# Patient Record
Sex: MALE | URBAN - METROPOLITAN AREA
[De-identification: names, ages, dates, MRNs, and addresses within clinical notes are randomized per-mention and may not be internally consistent; named-entity substitution may affect disease eponyms.]

---

## 2020-08-28 ENCOUNTER — TELEPHONE (OUTPATIENT)
Dept: NEUROLOGY | Age: 70
End: 2020-08-28

## 2023-10-05 PROBLEM — R93.1 AGATSTON CORONARY ARTERY CALCIUM SCORE LESS THAN 100: Status: ACTIVE | Noted: 2023-10-05

## 2023-10-05 PROBLEM — L57.0 ACTINIC KERATOSIS: Status: ACTIVE | Noted: 2023-10-05

## 2023-10-05 PROBLEM — R16.0 HEPATOMEGALY: Status: ACTIVE | Noted: 2023-10-05

## 2023-10-05 PROBLEM — M17.12 LEFT KNEE DJD: Status: ACTIVE | Noted: 2023-10-05

## 2023-10-05 PROBLEM — R94.31 ABNORMAL EKG: Status: ACTIVE | Noted: 2023-10-05

## 2023-10-05 PROBLEM — E66.811 CLASS 1 OBESITY WITH BODY MASS INDEX (BMI) OF 33.0 TO 33.9 IN ADULT: Status: ACTIVE | Noted: 2023-10-05

## 2023-10-05 PROBLEM — K05.10 GINGIVITIS: Status: ACTIVE | Noted: 2023-10-05

## 2023-10-05 PROBLEM — R06.02 SHORTNESS OF BREATH: Status: ACTIVE | Noted: 2023-10-05

## 2023-10-05 PROBLEM — D18.01 HEMANGIOMA OF SKIN AND SUBCUTANEOUS TISSUE: Status: ACTIVE | Noted: 2021-03-26

## 2023-10-05 PROBLEM — L81.4 OTHER MELANIN HYPERPIGMENTATION: Status: ACTIVE | Noted: 2021-03-26

## 2023-10-05 PROBLEM — L98.9 SKIN LESION: Status: ACTIVE | Noted: 2023-10-05

## 2023-10-05 PROBLEM — I49.9 VENTRICULAR ARRHYTHMIA: Status: ACTIVE | Noted: 2023-10-05

## 2023-10-05 PROBLEM — N52.9 ERECTILE DYSFUNCTION: Status: ACTIVE | Noted: 2023-10-05

## 2023-10-05 PROBLEM — C44.519 BASAL CELL CARCINOMA OF SKIN OF OTHER PART OF TRUNK: Status: ACTIVE | Noted: 2021-03-26

## 2023-10-05 PROBLEM — I10 BENIGN ESSENTIAL HYPERTENSION: Status: ACTIVE | Noted: 2023-10-05

## 2023-10-05 PROBLEM — R60.0 BILATERAL LEG EDEMA: Status: ACTIVE | Noted: 2023-10-05

## 2023-10-05 PROBLEM — I25.10 CAD (CORONARY ARTERY DISEASE): Status: ACTIVE | Noted: 2023-10-05

## 2023-10-05 PROBLEM — D48.5 NEOPLASM OF UNCERTAIN BEHAVIOR OF SKIN: Status: ACTIVE | Noted: 2021-03-26

## 2023-10-05 PROBLEM — D22.70 MELANOCYTIC NEVI OF LOWER LIMB, INCLUDING HIP: Status: ACTIVE | Noted: 2021-03-26

## 2023-10-05 PROBLEM — M19.011 DJD OF RIGHT SHOULDER: Status: ACTIVE | Noted: 2023-10-05

## 2023-10-05 PROBLEM — R97.20 ELEVATED PSA: Status: ACTIVE | Noted: 2023-10-05

## 2023-10-05 PROBLEM — R73.9 HYPERGLYCEMIA: Status: ACTIVE | Noted: 2023-10-05

## 2023-10-05 PROBLEM — L82.1 OTHER SEBORRHEIC KERATOSIS: Status: ACTIVE | Noted: 2021-03-26

## 2023-10-05 PROBLEM — E78.5 HYPERLIPIDEMIA: Status: ACTIVE | Noted: 2023-10-05

## 2023-10-05 PROBLEM — J30.9 ALLERGIC RHINITIS: Status: ACTIVE | Noted: 2023-10-05

## 2023-10-05 PROBLEM — K12.2 INFECTION OF MOUTH: Status: ACTIVE | Noted: 2023-10-05

## 2023-10-05 PROBLEM — E66.9 CLASS 1 OBESITY WITH BODY MASS INDEX (BMI) OF 33.0 TO 33.9 IN ADULT: Status: ACTIVE | Noted: 2023-10-05

## 2023-10-05 PROBLEM — D72.10 EOSINOPHILIA: Status: ACTIVE | Noted: 2023-10-05

## 2023-10-05 PROBLEM — K59.9 BOWEL DYSFUNCTION: Status: ACTIVE | Noted: 2023-10-05

## 2023-10-05 PROBLEM — S46.219A BICEPS MUSCLE TEAR: Status: ACTIVE | Noted: 2023-10-05

## 2023-10-05 PROBLEM — I49.3 PVC (PREMATURE VENTRICULAR CONTRACTION): Status: ACTIVE | Noted: 2023-10-05

## 2023-10-05 PROBLEM — M17.11 RIGHT KNEE DJD: Status: ACTIVE | Noted: 2023-10-05

## 2023-10-05 PROBLEM — R82.89 ABNORMAL URINE CYTOLOGY: Status: ACTIVE | Noted: 2023-10-05

## 2023-10-05 PROBLEM — M54.17 LUMBOSACRAL RADICULOPATHY AT L1: Status: ACTIVE | Noted: 2023-10-05

## 2023-10-05 PROBLEM — K76.0 FATTY LIVER: Status: ACTIVE | Noted: 2023-10-05

## 2023-10-05 PROBLEM — F10.10 ALCOHOL ABUSE: Status: ACTIVE | Noted: 2023-10-05

## 2023-10-05 PROBLEM — D22.60 MELANOCYTIC NEVI OF UNSPECIFIED UPPER LIMB, INCLUDING SHOULDER: Status: ACTIVE | Noted: 2021-03-26

## 2023-10-05 PROBLEM — D22.5 MELANOCYTIC NEVI OF TRUNK: Status: ACTIVE | Noted: 2021-03-26

## 2023-10-05 PROBLEM — L57.0 BENIGN KERATOSIS OF SKIN: Status: ACTIVE | Noted: 2023-10-05

## 2023-10-05 PROBLEM — E66.9 OBESITY (BMI 30-39.9): Status: ACTIVE | Noted: 2023-10-05

## 2023-10-05 RX ORDER — HYDROCHLOROTHIAZIDE 12.5 MG/1
12.5 CAPSULE ORAL DAILY
COMMUNITY
End: 2024-02-19 | Stop reason: WASHOUT

## 2023-10-05 RX ORDER — PRAVASTATIN SODIUM 20 MG/1
20 TABLET ORAL NIGHTLY
COMMUNITY
End: 2024-02-19 | Stop reason: SDUPTHER

## 2023-10-05 RX ORDER — DESLORATADINE 5 MG/1
5 TABLET ORAL DAILY
COMMUNITY
End: 2024-05-20

## 2023-10-05 RX ORDER — LIDOCAINE HCL 4 G/100G
CREAM TOPICAL DAILY PRN
COMMUNITY
Start: 2022-07-01

## 2023-10-05 RX ORDER — MELOXICAM 7.5 MG/1
7.5 TABLET ORAL DAILY
COMMUNITY
Start: 2023-02-28

## 2023-10-05 RX ORDER — LOSARTAN POTASSIUM 50 MG/1
1 TABLET ORAL DAILY
COMMUNITY

## 2023-10-05 RX ORDER — ASPIRIN 81 MG/1
1 TABLET ORAL DAILY
COMMUNITY

## 2023-10-05 RX ORDER — FLUTICASONE PROPIONATE 50 MCG
2 SPRAY, SUSPENSION (ML) NASAL DAILY
COMMUNITY
Start: 2023-03-27

## 2023-10-05 RX ORDER — METOPROLOL SUCCINATE 50 MG/1
25 TABLET, EXTENDED RELEASE ORAL 2 TIMES DAILY
COMMUNITY
End: 2024-02-19 | Stop reason: SDUPTHER

## 2023-10-05 RX ORDER — NITROFURANTOIN MACROCRYSTALS 50 MG/1
1 CAPSULE ORAL EVERY 12 HOURS
COMMUNITY
End: 2023-10-06 | Stop reason: ALTCHOICE

## 2023-10-05 RX ORDER — AMLODIPINE BESYLATE 10 MG/1
10 TABLET ORAL DAILY
COMMUNITY
End: 2023-10-06 | Stop reason: ALTCHOICE

## 2023-10-05 RX ORDER — IBUPROFEN 800 MG/1
800 TABLET ORAL 3 TIMES DAILY PRN
COMMUNITY
End: 2024-02-19 | Stop reason: SDUPTHER

## 2023-10-06 ENCOUNTER — OFFICE VISIT (OUTPATIENT)
Dept: CARDIOLOGY | Facility: CLINIC | Age: 73
End: 2023-10-06
Payer: MEDICARE

## 2023-10-06 VITALS
HEART RATE: 59 BPM | WEIGHT: 232.6 LBS | DIASTOLIC BLOOD PRESSURE: 80 MMHG | BODY MASS INDEX: 33.3 KG/M2 | HEIGHT: 70 IN | SYSTOLIC BLOOD PRESSURE: 130 MMHG

## 2023-10-06 DIAGNOSIS — R94.31 ABNORMAL EKG: ICD-10-CM

## 2023-10-06 DIAGNOSIS — I10 BENIGN ESSENTIAL HYPERTENSION: ICD-10-CM

## 2023-10-06 DIAGNOSIS — Z78.9 NEVER SMOKED CIGARETTES: ICD-10-CM

## 2023-10-06 DIAGNOSIS — I71.21 AORTIC ROOT ANEURYSM (CMS-HCC): ICD-10-CM

## 2023-10-06 DIAGNOSIS — E78.2 MIXED HYPERLIPIDEMIA: ICD-10-CM

## 2023-10-06 DIAGNOSIS — E66.9 CLASS 1 OBESITY WITH BODY MASS INDEX (BMI) OF 33.0 TO 33.9 IN ADULT, UNSPECIFIED OBESITY TYPE, UNSPECIFIED WHETHER SERIOUS COMORBIDITY PRESENT: ICD-10-CM

## 2023-10-06 DIAGNOSIS — I25.10 CORONARY ARTERY DISEASE INVOLVING NATIVE CORONARY ARTERY OF NATIVE HEART WITHOUT ANGINA PECTORIS: ICD-10-CM

## 2023-10-06 DIAGNOSIS — R93.1 AGATSTON CORONARY ARTERY CALCIUM SCORE LESS THAN 100: ICD-10-CM

## 2023-10-06 PROBLEM — Q25.43 AORTIC ROOT ANEURYSM: Status: ACTIVE | Noted: 2023-10-06

## 2023-10-06 PROCEDURE — 99214 OFFICE O/P EST MOD 30 MIN: CPT | Performed by: INTERNAL MEDICINE

## 2023-10-06 PROCEDURE — 1036F TOBACCO NON-USER: CPT | Performed by: INTERNAL MEDICINE

## 2023-10-06 PROCEDURE — 3075F SYST BP GE 130 - 139MM HG: CPT | Performed by: INTERNAL MEDICINE

## 2023-10-06 PROCEDURE — 1159F MED LIST DOCD IN RCRD: CPT | Performed by: INTERNAL MEDICINE

## 2023-10-06 PROCEDURE — 3079F DIAST BP 80-89 MM HG: CPT | Performed by: INTERNAL MEDICINE

## 2023-10-06 PROCEDURE — 3008F BODY MASS INDEX DOCD: CPT | Performed by: INTERNAL MEDICINE

## 2023-10-06 NOTE — PATIENT INSTRUCTIONS
Patient to follow up in 1 year with Dr. Justino MD     Echo is okay, it did demonstrate a small Aortic Root Aneurysm.   We will just monitor this annually for growth.     No other changes today.   Continue same medications.

## 2023-10-06 NOTE — PROGRESS NOTES
Referred by Dr. Zuluaga ref. provider found provider found for   Chief Complaint   Patient presents with    Follow-up     MPL/ECHO        History of Present Illness  Johann Casey is a 73 y.o. year old male patient patient is here for follow-up.  Nuclear stress test was completely normal.  Echocardiogram was normal except for mild dilatation of aortic root about 3.9 cm.  The results were discussed with the patient great length.  Apparently has some swelling from amlodipine therefore I told him to take it only as needed.  We will repeat echocardiogram in 1 year to evaluate aortic root aneurysm.  We will follow-up as scheduled    Past Medical History  Past Medical History:   Diagnosis Date    Disorder of the skin and subcutaneous tissue, unspecified     Skin lesion    Generalized skin eruption due to drugs and medicaments taken internally     Drug rash    Other specified abnormal findings of blood chemistry 02/25/2022    Abnormal LFTs    Other urticaria     Urticaria, acute    Strain of muscle, fascia and tendon of other parts of biceps, unspecified arm, initial encounter 02/25/2022    Biceps muscle tear       Social History  Social History     Tobacco Use    Smoking status: Never    Smokeless tobacco: Never   Substance Use Topics    Alcohol use: Yes    Drug use: Never       Family History     Family History   Problem Relation Name Age of Onset    Hypertension Mother      Cancer Mother      Colon cancer Mother      Hypertension Father      Lung cancer Father      Cancer Father         Review of Systems  As per HPI, all other systems reviewed and negative.    Allergies:  Allergies   Allergen Reactions    Amoxicillin Unknown    Losartan Rash        Outpatient Medications:  Current Outpatient Medications   Medication Instructions    aspirin 81 mg EC tablet 1 tablet, oral, Daily    desloratadine (CLARINEX) 5 mg, oral, Daily    fluticasone (Flonase) 50 mcg/actuation nasal spray 2 sprays, Each Nostril, Daily     hydroCHLOROthiazide (MICROZIDE) 12.5 mg, oral, Daily    ibuprofen 800 mg, oral, 3 times daily PRN, Take with food.    lidocaine (lidocaine HCL) 4 % cream Topical, Daily PRN    losartan (Cozaar) 50 mg tablet 1 tablet, oral, Daily    meloxicam (MOBIC) 7.5 mg, oral, Daily    metoprolol succinate XL (TOPROL-XL) 25 mg, oral, 2 times daily    pravastatin (PRAVACHOL) 20 mg, oral, Nightly         Vitals:  Vitals:    10/06/23 1423   BP: 130/80   Pulse: 59       Physical Exam:  Physical Exam  HENT:      Head: Normocephalic and atraumatic.   Eyes:      Extraocular Movements: Extraocular movements intact.      Pupils: Pupils are equal, round, and reactive to light.   Cardiovascular:      Rate and Rhythm: Normal rate and regular rhythm.      Pulses: Normal pulses.      Heart sounds: Normal heart sounds.   Pulmonary:      Effort: Pulmonary effort is normal.      Breath sounds: Normal breath sounds.   Abdominal:      General: Abdomen is flat.      Palpations: Abdomen is soft.   Musculoskeletal:      Right lower leg: No edema.      Left lower leg: No edema.   Skin:     General: Skin is warm and dry.   Neurological:      General: No focal deficit present.      Mental Status: He is alert and oriented to person, place, and time.             Assessment/Plan   Diagnoses and all orders for this visit:  Coronary artery disease involving native coronary artery of native heart without angina pectoris  Agatston coronary artery calcium score less than 100  Abnormal EKG  Benign essential hypertension  Mixed hyperlipidemia  Class 1 obesity with body mass index (BMI) of 33.0 to 33.9 in adult, unspecified obesity type, unspecified whether serious comorbidity present  Never smoked cigarettes  Aortic root aneurysm (CMS/HCC)          Fred Badillo MD [facility]  Interventional Cardiology   of [facility]     Thank you for allowing me to participate in the care of this patient. Please do not hesitate to contact me with any further  questions or concerns.

## 2024-02-19 ENCOUNTER — OFFICE VISIT (OUTPATIENT)
Dept: PRIMARY CARE | Facility: CLINIC | Age: 74
End: 2024-02-19
Payer: MEDICARE

## 2024-02-19 VITALS
HEIGHT: 70 IN | SYSTOLIC BLOOD PRESSURE: 122 MMHG | WEIGHT: 234.2 LBS | BODY MASS INDEX: 33.53 KG/M2 | TEMPERATURE: 98.1 F | HEART RATE: 57 BPM | DIASTOLIC BLOOD PRESSURE: 74 MMHG

## 2024-02-19 DIAGNOSIS — E66.9 OBESITY (BMI 30-39.9): ICD-10-CM

## 2024-02-19 DIAGNOSIS — Z00.00 WELL ADULT HEALTH CHECK: ICD-10-CM

## 2024-02-19 DIAGNOSIS — Z00.00 ROUTINE GENERAL MEDICAL EXAMINATION AT HEALTH CARE FACILITY: Primary | ICD-10-CM

## 2024-02-19 DIAGNOSIS — Z12.12 ENCOUNTER FOR COLORECTAL CANCER SCREENING: ICD-10-CM

## 2024-02-19 DIAGNOSIS — K76.0 FATTY LIVER: ICD-10-CM

## 2024-02-19 DIAGNOSIS — M17.0 OSTEOARTHRITIS OF BOTH KNEES, UNSPECIFIED OSTEOARTHRITIS TYPE: ICD-10-CM

## 2024-02-19 DIAGNOSIS — Z12.11 ENCOUNTER FOR COLORECTAL CANCER SCREENING: ICD-10-CM

## 2024-02-19 DIAGNOSIS — E78.2 MIXED HYPERLIPIDEMIA: ICD-10-CM

## 2024-02-19 DIAGNOSIS — R93.1 AGATSTON CORONARY ARTERY CALCIUM SCORE LESS THAN 100: ICD-10-CM

## 2024-02-19 DIAGNOSIS — I10 ESSENTIAL (PRIMARY) HYPERTENSION: ICD-10-CM

## 2024-02-19 DIAGNOSIS — I10 BENIGN ESSENTIAL HYPERTENSION: ICD-10-CM

## 2024-02-19 PROBLEM — H47.093 ASYMMETRY OF OPTIC NERVE OF BOTH EYES: Status: ACTIVE | Noted: 2023-10-20

## 2024-02-19 PROBLEM — H52.222 REGULAR ASTIGMATISM OF LEFT EYE: Status: ACTIVE | Noted: 2023-10-20

## 2024-02-19 PROBLEM — I77.89 ENLARGED AORTA (CMS-HCC): Status: ACTIVE | Noted: 2023-11-20

## 2024-02-19 PROBLEM — H35.3131 EARLY DRY STAGE NONEXUDATIVE AGE-RELATED MACULAR DEGENERATION OF BOTH EYES: Status: ACTIVE | Noted: 2023-10-20

## 2024-02-19 PROBLEM — H53.042 AMBLYOPIA SUSPECT, LEFT EYE: Status: ACTIVE | Noted: 2023-10-20

## 2024-02-19 PROBLEM — H02.834 DERMATOCHALASIS OF BOTH UPPER EYELIDS: Status: ACTIVE | Noted: 2023-10-20

## 2024-02-19 PROBLEM — Z96.1 PSEUDOPHAKIA: Status: ACTIVE | Noted: 2023-12-01

## 2024-02-19 PROBLEM — H02.831 DERMATOCHALASIS OF BOTH UPPER EYELIDS: Status: ACTIVE | Noted: 2023-10-20

## 2024-02-19 PROCEDURE — 1159F MED LIST DOCD IN RCRD: CPT | Performed by: INTERNAL MEDICINE

## 2024-02-19 PROCEDURE — 1160F RVW MEDS BY RX/DR IN RCRD: CPT | Performed by: INTERNAL MEDICINE

## 2024-02-19 PROCEDURE — 1170F FXNL STATUS ASSESSED: CPT | Performed by: INTERNAL MEDICINE

## 2024-02-19 PROCEDURE — 3074F SYST BP LT 130 MM HG: CPT | Performed by: INTERNAL MEDICINE

## 2024-02-19 PROCEDURE — 3008F BODY MASS INDEX DOCD: CPT | Performed by: INTERNAL MEDICINE

## 2024-02-19 PROCEDURE — G0439 PPPS, SUBSEQ VISIT: HCPCS | Performed by: INTERNAL MEDICINE

## 2024-02-19 PROCEDURE — 1036F TOBACCO NON-USER: CPT | Performed by: INTERNAL MEDICINE

## 2024-02-19 PROCEDURE — 3078F DIAST BP <80 MM HG: CPT | Performed by: INTERNAL MEDICINE

## 2024-02-19 RX ORDER — LOSARTAN POTASSIUM 50 MG/1
50 TABLET ORAL DAILY
Qty: 90 TABLET | Refills: 0 | Status: CANCELLED | OUTPATIENT
Start: 2024-02-19

## 2024-02-19 RX ORDER — HYDROCHLOROTHIAZIDE 12.5 MG/1
12.5 CAPSULE ORAL DAILY
Qty: 90 CAPSULE | Refills: 1 | Status: SHIPPED | OUTPATIENT
Start: 2024-02-19

## 2024-02-19 RX ORDER — METOPROLOL SUCCINATE 50 MG/1
25 TABLET, EXTENDED RELEASE ORAL 2 TIMES DAILY
Qty: 90 TABLET | Refills: 1 | Status: SHIPPED | OUTPATIENT
Start: 2024-02-19

## 2024-02-19 RX ORDER — IBUPROFEN 400 MG/1
400 TABLET ORAL 3 TIMES DAILY PRN
Qty: 21 TABLET | Refills: 0 | Status: SHIPPED | OUTPATIENT
Start: 2024-02-19 | End: 2024-02-26

## 2024-02-19 RX ORDER — LIDOCAINE HCL 4 G/100G
CREAM TOPICAL DAILY PRN
Status: CANCELLED | OUTPATIENT
Start: 2024-02-19

## 2024-02-19 RX ORDER — PRAVASTATIN SODIUM 20 MG/1
20 TABLET ORAL NIGHTLY
Qty: 90 TABLET | Refills: 1 | Status: SHIPPED | OUTPATIENT
Start: 2024-02-19

## 2024-02-19 ASSESSMENT — ACTIVITIES OF DAILY LIVING (ADL)
DOING_HOUSEWORK: INDEPENDENT
BATHING: INDEPENDENT
MANAGING_FINANCES: INDEPENDENT
TAKING_MEDICATION: INDEPENDENT
DRESSING: INDEPENDENT
GROCERY_SHOPPING: INDEPENDENT

## 2024-02-19 ASSESSMENT — PATIENT HEALTH QUESTIONNAIRE - PHQ9
2. FEELING DOWN, DEPRESSED OR HOPELESS: NOT AT ALL
SUM OF ALL RESPONSES TO PHQ9 QUESTIONS 1 AND 2: 0
1. LITTLE INTEREST OR PLEASURE IN DOING THINGS: NOT AT ALL

## 2024-02-19 NOTE — PROGRESS NOTES
"Subjective   Reason for Visit: Johann Casey is an 73 y.o. male here for a Medicare Wellness visit.          Reviewed all medications by prescribing practitioner or clinical pharmacist (such as prescriptions, OTCs, herbal therapies and supplements) and documented in the medical record.    HPI  Came for annual wellness visit he is happy to report that he has not been drinking he is very well he is happy about his status and has no complaint wanted refill  He does have a living will  His immunization schedule is not very clear and he has information at home and he will bring it at the next visit so that appropriate correction is made and appropriate vaccination is advised  He did not have a colonoscopic examination and he initially was reluctant but agreed to for it    Patient Care Team:  Bg Yanez MD as PCP - General  Bg Yanez MD as PCP - [employer] PCP     Review of Systems tensioning negative except that the knees had arthritis problems and he has had a steroid injection by orthopedic physician which is working well  Past medical history reviewed  Social and family history reviewed  Allergies and medications reviewed  Recent labs reviewed  Vital signs reviewed    Objective   Vitals:  /74 (BP Location: Right arm, Patient Position: Sitting)   Pulse 57   Temp 36.7 °C (98.1 °F)   Ht 1.765 m (5' 9.5\")   Wt 106 kg (234 lb 3.2 oz)   BMI 34.09 kg/m²       Physical Exam  HEENT exam is negative heart sounds are regular chest is clear abdomen is soft nontender neuro awake alert    Assessment/Plan   Problem List Items Addressed This Visit       Agatston coronary artery calcium score less than 100    Benign essential hypertension    Fatty liver    Relevant Orders    TSH with reflex to Free T4 if abnormal    Hyperlipidemia    Relevant Medications    pravastatin (Pravachol) 20 mg tablet    Other Relevant Orders    Comprehensive Metabolic Panel    Lipid Panel    TSH with reflex to Free T4 " if abnormal    Osteoarthritis of both knees    Relevant Medications    ibuprofen 400 mg tablet    Obesity (BMI 30-39.9)    Relevant Orders    TSH with reflex to Free T4 if abnormal    Encounter for colorectal cancer screening    Relevant Orders    Colonoscopy Screening; Average Risk Patient     Other Visit Diagnoses       Routine general medical examination at health care facility    -  Primary    Essential (primary) hypertension        Relevant Medications    hydroCHLOROthiazide (Microzide) 12.5 mg capsule    metoprolol succinate XL (Toprol-XL) 50 mg 24 hr tablet          Follow-up in 3 months time but most important we are not sure about his medication he will go home and calling to address discrepancy in the medication list  It seems he is not on losartan but losartan is carrying on from before and that is not the right choice and that was not refilled  He was supposed to be amlodipine but that is not on the list either  Labs as ordered to be done which is due  Colonoscopy ordered

## 2024-05-19 DIAGNOSIS — J30.9 ALLERGIC RHINITIS, UNSPECIFIED: ICD-10-CM

## 2024-05-20 RX ORDER — DESLORATADINE 5 MG/1
5 TABLET ORAL DAILY
Qty: 90 TABLET | Refills: 0 | Status: SHIPPED | OUTPATIENT
Start: 2024-05-20

## 2024-08-13 DIAGNOSIS — E78.2 MIXED HYPERLIPIDEMIA: ICD-10-CM

## 2024-08-13 DIAGNOSIS — I10 ESSENTIAL (PRIMARY) HYPERTENSION: ICD-10-CM

## 2024-08-14 RX ORDER — METOPROLOL SUCCINATE 50 MG/1
25 TABLET, EXTENDED RELEASE ORAL 2 TIMES DAILY
Qty: 180 TABLET | Refills: 0 | Status: SHIPPED | OUTPATIENT
Start: 2024-08-14

## 2024-08-14 RX ORDER — HYDROCHLOROTHIAZIDE 12.5 MG/1
12.5 CAPSULE ORAL DAILY
Qty: 90 CAPSULE | Refills: 0 | Status: SHIPPED | OUTPATIENT
Start: 2024-08-14

## 2024-08-14 RX ORDER — PRAVASTATIN SODIUM 20 MG/1
20 TABLET ORAL NIGHTLY
Qty: 90 TABLET | Refills: 0 | Status: SHIPPED | OUTPATIENT
Start: 2024-08-14

## 2024-08-22 ENCOUNTER — APPOINTMENT (OUTPATIENT)
Dept: RADIOLOGY | Facility: HOSPITAL | Age: 74
DRG: 308 | End: 2024-08-22
Payer: MEDICARE

## 2024-08-22 ENCOUNTER — OFFICE VISIT (OUTPATIENT)
Dept: PRIMARY CARE | Facility: CLINIC | Age: 74
End: 2024-08-22
Payer: MEDICARE

## 2024-08-22 ENCOUNTER — HOSPITAL ENCOUNTER (INPATIENT)
Facility: HOSPITAL | Age: 74
LOS: 2 days | Discharge: HOME | DRG: 308 | End: 2024-08-24
Attending: EMERGENCY MEDICINE | Admitting: STUDENT IN AN ORGANIZED HEALTH CARE EDUCATION/TRAINING PROGRAM
Payer: MEDICARE

## 2024-08-22 ENCOUNTER — APPOINTMENT (OUTPATIENT)
Dept: CARDIOLOGY | Facility: HOSPITAL | Age: 74
DRG: 308 | End: 2024-08-22
Payer: MEDICARE

## 2024-08-22 VITALS
WEIGHT: 223.4 LBS | OXYGEN SATURATION: 98 % | HEIGHT: 70 IN | BODY MASS INDEX: 31.98 KG/M2 | HEART RATE: 31 BPM | SYSTOLIC BLOOD PRESSURE: 100 MMHG | DIASTOLIC BLOOD PRESSURE: 58 MMHG

## 2024-08-22 DIAGNOSIS — R06.09 EXERTIONAL DYSPNEA: ICD-10-CM

## 2024-08-22 DIAGNOSIS — I48.3 TYPICAL ATRIAL FLUTTER (MULTI): ICD-10-CM

## 2024-08-22 DIAGNOSIS — I10 PRIMARY HYPERTENSION: ICD-10-CM

## 2024-08-22 DIAGNOSIS — I48.92 ATRIAL FLUTTER, UNSPECIFIED TYPE (MULTI): Primary | ICD-10-CM

## 2024-08-22 DIAGNOSIS — I10 BENIGN ESSENTIAL HYPERTENSION: ICD-10-CM

## 2024-08-22 DIAGNOSIS — I48.92 ATRIAL FLUTTER WITH RAPID VENTRICULAR RESPONSE (MULTI): Primary | ICD-10-CM

## 2024-08-22 DIAGNOSIS — R53.83 OTHER FATIGUE: ICD-10-CM

## 2024-08-22 DIAGNOSIS — I50.20 HFREF (HEART FAILURE WITH REDUCED EJECTION FRACTION) (MULTI): ICD-10-CM

## 2024-08-22 DIAGNOSIS — E78.2 MIXED HYPERLIPIDEMIA: ICD-10-CM

## 2024-08-22 DIAGNOSIS — I48.4 ATYPICAL ATRIAL FLUTTER (MULTI): ICD-10-CM

## 2024-08-22 DIAGNOSIS — I48.19 PERSISTENT ATRIAL FIBRILLATION (MULTI): ICD-10-CM

## 2024-08-22 LAB
ALBUMIN SERPL BCP-MCNC: 4 G/DL (ref 3.4–5)
ALP SERPL-CCNC: 47 U/L (ref 33–136)
ALT SERPL W P-5'-P-CCNC: 29 U/L (ref 10–52)
ANION GAP SERPL CALC-SCNC: 13 MMOL/L (ref 10–20)
AST SERPL W P-5'-P-CCNC: 25 U/L (ref 9–39)
BASOPHILS # BLD AUTO: 0.04 X10*3/UL (ref 0–0.1)
BASOPHILS NFR BLD AUTO: 0.4 %
BILIRUB SERPL-MCNC: 1.6 MG/DL (ref 0–1.2)
BNP SERPL-MCNC: 498 PG/ML (ref 0–99)
BUN SERPL-MCNC: 18 MG/DL (ref 6–23)
CALCIUM SERPL-MCNC: 9.3 MG/DL (ref 8.6–10.3)
CARDIAC TROPONIN I PNL SERPL HS: 10 NG/L (ref 0–20)
CARDIAC TROPONIN I PNL SERPL HS: 10 NG/L (ref 0–20)
CHLORIDE SERPL-SCNC: 95 MMOL/L (ref 98–107)
CO2 SERPL-SCNC: 28 MMOL/L (ref 21–32)
CREAT SERPL-MCNC: 1.36 MG/DL (ref 0.5–1.3)
EGFRCR SERPLBLD CKD-EPI 2021: 55 ML/MIN/1.73M*2
EOSINOPHIL # BLD AUTO: 0.51 X10*3/UL (ref 0–0.4)
EOSINOPHIL NFR BLD AUTO: 5.3 %
ERYTHROCYTE [DISTWIDTH] IN BLOOD BY AUTOMATED COUNT: 12.3 % (ref 11.5–14.5)
GLUCOSE SERPL-MCNC: 118 MG/DL (ref 74–99)
HCT VFR BLD AUTO: 43.8 % (ref 41–52)
HGB BLD-MCNC: 14.7 G/DL (ref 13.5–17.5)
IMM GRANULOCYTES # BLD AUTO: 0.07 X10*3/UL (ref 0–0.5)
IMM GRANULOCYTES NFR BLD AUTO: 0.7 % (ref 0–0.9)
INR PPP: 1.2 (ref 0.9–1.1)
LYMPHOCYTES # BLD AUTO: 1.44 X10*3/UL (ref 0.8–3)
LYMPHOCYTES NFR BLD AUTO: 15 %
MCH RBC QN AUTO: 34.2 PG (ref 26–34)
MCHC RBC AUTO-ENTMCNC: 33.6 G/DL (ref 32–36)
MCV RBC AUTO: 102 FL (ref 80–100)
MONOCYTES # BLD AUTO: 1.02 X10*3/UL (ref 0.05–0.8)
MONOCYTES NFR BLD AUTO: 10.6 %
NEUTROPHILS # BLD AUTO: 6.53 X10*3/UL (ref 1.6–5.5)
NEUTROPHILS NFR BLD AUTO: 68 %
NRBC BLD-RTO: 0 /100 WBCS (ref 0–0)
PLATELET # BLD AUTO: 171 X10*3/UL (ref 150–450)
POTASSIUM SERPL-SCNC: 3.8 MMOL/L (ref 3.5–5.3)
PROT SERPL-MCNC: 6.9 G/DL (ref 6.4–8.2)
PROTHROMBIN TIME: 13.7 SECONDS (ref 9.8–12.8)
RBC # BLD AUTO: 4.3 X10*6/UL (ref 4.5–5.9)
SODIUM SERPL-SCNC: 132 MMOL/L (ref 136–145)
TSH SERPL-ACNC: 1.03 MIU/L (ref 0.44–3.98)
WBC # BLD AUTO: 9.6 X10*3/UL (ref 4.4–11.3)

## 2024-08-22 PROCEDURE — 1159F MED LIST DOCD IN RCRD: CPT | Performed by: INTERNAL MEDICINE

## 2024-08-22 PROCEDURE — 85610 PROTHROMBIN TIME: CPT | Performed by: EMERGENCY MEDICINE

## 2024-08-22 PROCEDURE — 93005 ELECTROCARDIOGRAM TRACING: CPT

## 2024-08-22 PROCEDURE — 36415 COLL VENOUS BLD VENIPUNCTURE: CPT | Performed by: EMERGENCY MEDICINE

## 2024-08-22 PROCEDURE — 84484 ASSAY OF TROPONIN QUANT: CPT | Performed by: EMERGENCY MEDICINE

## 2024-08-22 PROCEDURE — 80053 COMPREHEN METABOLIC PANEL: CPT | Performed by: EMERGENCY MEDICINE

## 2024-08-22 PROCEDURE — 83880 ASSAY OF NATRIURETIC PEPTIDE: CPT | Performed by: EMERGENCY MEDICINE

## 2024-08-22 PROCEDURE — 84443 ASSAY THYROID STIM HORMONE: CPT

## 2024-08-22 PROCEDURE — 3008F BODY MASS INDEX DOCD: CPT | Performed by: INTERNAL MEDICINE

## 2024-08-22 PROCEDURE — 85025 COMPLETE CBC W/AUTO DIFF WBC: CPT | Performed by: EMERGENCY MEDICINE

## 2024-08-22 PROCEDURE — 3074F SYST BP LT 130 MM HG: CPT | Performed by: INTERNAL MEDICINE

## 2024-08-22 PROCEDURE — 2060000001 HC INTERMEDIATE ICU ROOM DAILY

## 2024-08-22 PROCEDURE — 85610 PROTHROMBIN TIME: CPT | Performed by: STUDENT IN AN ORGANIZED HEALTH CARE EDUCATION/TRAINING PROGRAM

## 2024-08-22 PROCEDURE — 2500000004 HC RX 250 GENERAL PHARMACY W/ HCPCS (ALT 636 FOR OP/ED)

## 2024-08-22 PROCEDURE — 36415 COLL VENOUS BLD VENIPUNCTURE: CPT | Performed by: STUDENT IN AN ORGANIZED HEALTH CARE EDUCATION/TRAINING PROGRAM

## 2024-08-22 PROCEDURE — 93010 ELECTROCARDIOGRAM REPORT: CPT | Performed by: EMERGENCY MEDICINE

## 2024-08-22 PROCEDURE — 2500000004 HC RX 250 GENERAL PHARMACY W/ HCPCS (ALT 636 FOR OP/ED): Performed by: EMERGENCY MEDICINE

## 2024-08-22 PROCEDURE — 93000 ELECTROCARDIOGRAM COMPLETE: CPT | Performed by: INTERNAL MEDICINE

## 2024-08-22 PROCEDURE — 80053 COMPREHEN METABOLIC PANEL: CPT | Performed by: STUDENT IN AN ORGANIZED HEALTH CARE EDUCATION/TRAINING PROGRAM

## 2024-08-22 PROCEDURE — 99214 OFFICE O/P EST MOD 30 MIN: CPT | Performed by: INTERNAL MEDICINE

## 2024-08-22 PROCEDURE — 93010 ELECTROCARDIOGRAM REPORT: CPT | Performed by: STUDENT IN AN ORGANIZED HEALTH CARE EDUCATION/TRAINING PROGRAM

## 2024-08-22 PROCEDURE — 71045 X-RAY EXAM CHEST 1 VIEW: CPT

## 2024-08-22 PROCEDURE — 99291 CRITICAL CARE FIRST HOUR: CPT | Mod: 25

## 2024-08-22 PROCEDURE — 71045 X-RAY EXAM CHEST 1 VIEW: CPT | Performed by: RADIOLOGY

## 2024-08-22 PROCEDURE — 96365 THER/PROPH/DIAG IV INF INIT: CPT

## 2024-08-22 PROCEDURE — 85025 COMPLETE CBC W/AUTO DIFF WBC: CPT | Performed by: STUDENT IN AN ORGANIZED HEALTH CARE EDUCATION/TRAINING PROGRAM

## 2024-08-22 PROCEDURE — 3078F DIAST BP <80 MM HG: CPT | Performed by: INTERNAL MEDICINE

## 2024-08-22 PROCEDURE — 1036F TOBACCO NON-USER: CPT | Performed by: INTERNAL MEDICINE

## 2024-08-22 PROCEDURE — 96366 THER/PROPH/DIAG IV INF ADDON: CPT

## 2024-08-22 PROCEDURE — 2500000005 HC RX 250 GENERAL PHARMACY W/O HCPCS

## 2024-08-22 PROCEDURE — 99291 CRITICAL CARE FIRST HOUR: CPT | Performed by: EMERGENCY MEDICINE

## 2024-08-22 RX ORDER — DILTIAZEM HYDROCHLORIDE 5 MG/ML
10 INJECTION INTRAVENOUS ONCE
Status: COMPLETED | OUTPATIENT
Start: 2024-08-22 | End: 2024-08-22

## 2024-08-22 RX ORDER — ENOXAPARIN SODIUM 100 MG/ML
40 INJECTION SUBCUTANEOUS EVERY 24 HOURS
Status: DISCONTINUED | OUTPATIENT
Start: 2024-08-22 | End: 2024-08-23 | Stop reason: ALTCHOICE

## 2024-08-22 RX ORDER — DILTIAZEM HCL/D5W 125 MG/125
5-15 PLASTIC BAG, INJECTION (ML) INTRAVENOUS CONTINUOUS
Status: DISCONTINUED | OUTPATIENT
Start: 2024-08-22 | End: 2024-08-22

## 2024-08-22 RX ORDER — DILTIAZEM HCL/D5W 125 MG/125
10 PLASTIC BAG, INJECTION (ML) INTRAVENOUS CONTINUOUS
Status: DISCONTINUED | OUTPATIENT
Start: 2024-08-22 | End: 2024-08-22

## 2024-08-22 SDOH — SOCIAL STABILITY: SOCIAL INSECURITY: DO YOU FEEL ANYONE HAS EXPLOITED OR TAKEN ADVANTAGE OF YOU FINANCIALLY OR OF YOUR PERSONAL PROPERTY?: NO

## 2024-08-22 SDOH — SOCIAL STABILITY: SOCIAL INSECURITY: HAVE YOU HAD THOUGHTS OF HARMING ANYONE ELSE?: NO

## 2024-08-22 SDOH — SOCIAL STABILITY: SOCIAL INSECURITY: WERE YOU ABLE TO COMPLETE ALL THE BEHAVIORAL HEALTH SCREENINGS?: YES

## 2024-08-22 SDOH — SOCIAL STABILITY: SOCIAL INSECURITY: DOES ANYONE TRY TO KEEP YOU FROM HAVING/CONTACTING OTHER FRIENDS OR DOING THINGS OUTSIDE YOUR HOME?: NO

## 2024-08-22 SDOH — SOCIAL STABILITY: SOCIAL INSECURITY: ARE THERE ANY APPARENT SIGNS OF INJURIES/BEHAVIORS THAT COULD BE RELATED TO ABUSE/NEGLECT?: NO

## 2024-08-22 SDOH — SOCIAL STABILITY: SOCIAL INSECURITY: HAS ANYONE EVER THREATENED TO HURT YOUR FAMILY OR YOUR PETS?: NO

## 2024-08-22 SDOH — SOCIAL STABILITY: SOCIAL INSECURITY: HAVE YOU HAD ANY THOUGHTS OF HARMING ANYONE ELSE?: NO

## 2024-08-22 SDOH — SOCIAL STABILITY: SOCIAL INSECURITY: ARE YOU OR HAVE YOU BEEN THREATENED OR ABUSED PHYSICALLY, EMOTIONALLY, OR SEXUALLY BY ANYONE?: NO

## 2024-08-22 SDOH — SOCIAL STABILITY: SOCIAL INSECURITY: DO YOU FEEL UNSAFE GOING BACK TO THE PLACE WHERE YOU ARE LIVING?: NO

## 2024-08-22 SDOH — SOCIAL STABILITY: SOCIAL INSECURITY: ABUSE: ADULT

## 2024-08-22 ASSESSMENT — LIFESTYLE VARIABLES
HAVE YOU EVER FELT YOU SHOULD CUT DOWN ON YOUR DRINKING: NO
AUDIT-C TOTAL SCORE: 4
HOW MANY STANDARD DRINKS CONTAINING ALCOHOL DO YOU HAVE ON A TYPICAL DAY: 1 OR 2
AUDIT-C TOTAL SCORE: 4
HOW OFTEN DO YOU HAVE 6 OR MORE DRINKS ON ONE OCCASION: NEVER
EVER HAD A DRINK FIRST THING IN THE MORNING TO STEADY YOUR NERVES TO GET RID OF A HANGOVER: NO
SKIP TO QUESTIONS 9-10: 1
HAVE PEOPLE ANNOYED YOU BY CRITICIZING YOUR DRINKING: NO
HOW OFTEN DO YOU HAVE A DRINK CONTAINING ALCOHOL: 4 OR MORE TIMES A WEEK
TOTAL SCORE: 0
EVER FELT BAD OR GUILTY ABOUT YOUR DRINKING: NO

## 2024-08-22 ASSESSMENT — PATIENT HEALTH QUESTIONNAIRE - PHQ9
1. LITTLE INTEREST OR PLEASURE IN DOING THINGS: NOT AT ALL
SUM OF ALL RESPONSES TO PHQ9 QUESTIONS 1 & 2: 0
2. FEELING DOWN, DEPRESSED OR HOPELESS: NOT AT ALL
2. FEELING DOWN, DEPRESSED OR HOPELESS: NOT AT ALL
1. LITTLE INTEREST OR PLEASURE IN DOING THINGS: NOT AT ALL
SUM OF ALL RESPONSES TO PHQ9 QUESTIONS 1 AND 2: 0

## 2024-08-22 ASSESSMENT — ACTIVITIES OF DAILY LIVING (ADL)
LACK_OF_TRANSPORTATION: NO
HEARING - LEFT EAR: FUNCTIONAL
WALKS IN HOME: INDEPENDENT
JUDGMENT_ADEQUATE_SAFELY_COMPLETE_DAILY_ACTIVITIES: YES
BATHING: INDEPENDENT
HEARING - RIGHT EAR: FUNCTIONAL
ASSISTIVE_DEVICE: EYEGLASSES
FEEDING YOURSELF: INDEPENDENT
ADEQUATE_TO_COMPLETE_ADL: YES
DRESSING YOURSELF: INDEPENDENT
PATIENT'S MEMORY ADEQUATE TO SAFELY COMPLETE DAILY ACTIVITIES?: YES
TOILETING: INDEPENDENT
GROOMING: INDEPENDENT

## 2024-08-22 ASSESSMENT — ENCOUNTER SYMPTOMS
CHEST TIGHTNESS: 0
FATIGUE: 1
FEVER: 0
FACIAL ASYMMETRY: 0
COUGH: 0
DIARRHEA: 0
WHEEZING: 0
DIAPHORESIS: 1
NAUSEA: 1
BLOOD IN STOOL: 0
TREMORS: 0
ARTHRALGIAS: 0
HEADACHES: 0
CHILLS: 0
DIFFICULTY URINATING: 0
STRIDOR: 0
NUMBNESS: 0
PALPITATIONS: 0
WEAKNESS: 1
CONSTIPATION: 0
JOINT SWELLING: 1
DYSURIA: 0
ABDOMINAL PAIN: 0
ABDOMINAL DISTENTION: 0
UNEXPECTED WEIGHT CHANGE: 0
VOMITING: 0
DIZZINESS: 0
CONFUSION: 0
APPETITE CHANGE: 1
LIGHT-HEADEDNESS: 0
SORE THROAT: 0
ACTIVITY CHANGE: 1
SHORTNESS OF BREATH: 1

## 2024-08-22 ASSESSMENT — COGNITIVE AND FUNCTIONAL STATUS - GENERAL
MOBILITY SCORE: 24
PATIENT BASELINE BEDBOUND: NO
DAILY ACTIVITIY SCORE: 24

## 2024-08-22 ASSESSMENT — PAIN SCALES - GENERAL
PAINLEVEL_OUTOF10: 0 - NO PAIN

## 2024-08-22 ASSESSMENT — COLUMBIA-SUICIDE SEVERITY RATING SCALE - C-SSRS
6. HAVE YOU EVER DONE ANYTHING, STARTED TO DO ANYTHING, OR PREPARED TO DO ANYTHING TO END YOUR LIFE?: NO
2. HAVE YOU ACTUALLY HAD ANY THOUGHTS OF KILLING YOURSELF?: NO
1. IN THE PAST MONTH, HAVE YOU WISHED YOU WERE DEAD OR WISHED YOU COULD GO TO SLEEP AND NOT WAKE UP?: NO

## 2024-08-22 ASSESSMENT — PAIN - FUNCTIONAL ASSESSMENT
PAIN_FUNCTIONAL_ASSESSMENT: 0-10
PAIN_FUNCTIONAL_ASSESSMENT: 0-10

## 2024-08-22 NOTE — ED PROVIDER NOTES
HPI   Chief Complaint   Patient presents with    Abnormal EKG     Pt went to his PCP today and was found to be in new onset A flutter; pt denies any cp, palpitations or SOB. Endorsing fatigue. No blood thinners       HPI        Patient History   Past Medical History:   Diagnosis Date    Disorder of the skin and subcutaneous tissue, unspecified     Skin lesion    Generalized skin eruption due to drugs and medicaments taken internally     Drug rash    Other specified abnormal findings of blood chemistry 02/25/2022    Abnormal LFTs    Other urticaria     Urticaria, acute    Strain of muscle, fascia and tendon of other parts of biceps, unspecified arm, initial encounter 02/25/2022    Biceps muscle tear     Past Surgical History:   Procedure Laterality Date    CATARACT EXTRACTION, BILATERAL Left 01/25/2024    CATARACT EXTRACTION, BILATERAL Right 01/18/2024    OTHER SURGICAL HISTORY  02/08/2022    Skin biopsy    OTHER SURGICAL HISTORY  02/08/2022    Tonsillectomy    OTHER SURGICAL HISTORY  03/01/2022    Vasectomy     Family History   Problem Relation Name Age of Onset    Hypertension Mother      Cancer Mother      Colon cancer Mother      Hypertension Father      Lung cancer Father      Cancer Father       Social History     Tobacco Use    Smoking status: Never    Smokeless tobacco: Never   Substance Use Topics    Alcohol use: Yes     Comment: daily 4 beers; last drink Sunday    Drug use: Never       Physical Exam   ED Triage Vitals [08/22/24 1740]   Temperature Heart Rate Respirations BP   37.1 °C (98.8 °F) (!) 146 16 125/71      Pulse Ox Temp Source Heart Rate Source Patient Position   98 % Temporal -- --      BP Location FiO2 (%)     -- --       Physical Exam      ED Course & MDM                  No data recorded     Suresh Coma Scale Score: 15 (08/22/24 1739 : Margi Lyons RN)                           Medical Decision Making  =================Attending note===============    The patient was seen by the  resident/fellow.  I have personally performed a substantive portion of the encounter.  I have seen and examined the patient; agree with the workup, evaluation, MDM,   management and diagnosis.  The care plan has been discussed with the resident; I have reviewed the resident's note and agree with the documented findings.      This is a 73 y.o. male who presents to ER from his primary care doctor's office because of atrial fibrillation with rapid ventricular response.  He had an appointment with his doctor today that he was scheduled previously for.  He is seeing his doctor because he has been having fatigue for months.  They noticed he was tachycardic today and did an EKG and he was in A-fib.  He has no history of A-fib.  States that on Sunday when he is coming home from [facility] he thought his fingernails looked yellow and therefore he quit drinking alcohol on Monday.  He is never had issues with withdrawal in the past.  Is never had any issues with cardiac dysrhythmia.  He states he is been having dyspnea on exertion for months.  Is been no chest pain.  No shortness of breath at rest.  No palpitations.  No nausea or vomiting or diarrhea.  Is no history of blood clots.  He does have a history of hypertension and hyperlipidemia.  He states he did have echocardiogram done with cardiology in the past and there was questionable abnormal valve.  Heart is irregular regular and tachycardic.  Lungs are clear.  Abdomen is soft and nontender.    EKG: Atrial flutter with a ventricular rate of 130.  QTc 467.  No ST changes.    Patient is started on a Cardizem bolus and drip due to his A-fib with RVR.    Repeat EKG: Atrial fibrillation with a rate of 116.  QTc 480.  No ST changes.    Shortly after the repeat EKG the patient's heart rate dropped into the 70s to 80s range.          ==========================================          Procedure  Critical Care    Performed by: Byron Rueda DO  Authorized by: Byron Rueda DO     Critical care provider statement:     Critical care time (minutes):  35    Critical care time was exclusive of:  Teaching time and separately billable procedures and treating other patients    Critical care was necessary to treat or prevent imminent or life-threatening deterioration of the following conditions:  Cardiac failure    Care discussed with: admitting provider    Comments:      Critical care time due to patient having atrial fibrillation with rapid ventricular rate requiring Cardizem bolus and drip.

## 2024-08-22 NOTE — PROGRESS NOTES
Assessment/Plan   Problem List Items Addressed This Visit       Benign essential hypertension    Hyperlipidemia    Atrial flutter (Multi) - Primary    Relevant Orders    ECG 12 Lead (Completed)    Other fatigue   In view of fast ventricular rate and fatigue with atrial arrhythmia of new onset  Ambulance is being called to transfer to ER    Subjective   Patient ID: Johann Casey is a 73 y.o. male who presents for Shortness of Breath (Has fallen twice recently due to SOB, always tired, fingernails have turned yellow one time, and loss of appetite.  States that he can go 2 days without eating.).    Past Surgical History:   Procedure Laterality Date    CATARACT EXTRACTION, BILATERAL Left 01/25/2024    CATARACT EXTRACTION, BILATERAL Right 01/18/2024    OTHER SURGICAL HISTORY  02/08/2022    Skin biopsy    OTHER SURGICAL HISTORY  02/08/2022    Tonsillectomy    OTHER SURGICAL HISTORY  03/01/2022    Vasectomy      Family History   Problem Relation Name Age of Onset    Hypertension Mother      Cancer Mother      Colon cancer Mother      Hypertension Father      Lung cancer Father      Cancer Father        Social History     Socioeconomic History    Marital status: Single     Spouse name: Not on file    Number of children: Not on file    Years of education: Not on file    Highest education level: Not on file   Occupational History    Not on file   Tobacco Use    Smoking status: Never    Smokeless tobacco: Never   Substance and Sexual Activity    Alcohol use: Yes    Drug use: Never    Sexual activity: Not on file   Other Topics Concern    Not on file   Social History Narrative    Not on file     Social Determinants of Health     Financial Resource Strain: Not on file   Food Insecurity: Not on file   Transportation Needs: Not on file   Physical Activity: Not on file   Stress: Not on file   Social Connections: Not on file   Intimate Partner Violence: Not on file   Housing Stability: Not on file      Amoxicillin and Losartan  "  Current Outpatient Medications   Medication Sig Dispense Refill    aspirin 81 mg EC tablet Take 1 tablet (81 mg) by mouth once daily.      desloratadine (Clarinex) 5 mg tablet TAKE 1 TABLET BY MOUTH EVERY DAY 90 tablet 0    fluticasone (Flonase) 50 mcg/actuation nasal spray Administer 2 sprays into each nostril once daily.      hydroCHLOROthiazide (Microzide) 12.5 mg capsule TAKE 1 CAPSULE BY MOUTH ONCE DAILY. 90 capsule 0    lidocaine (lidocaine HCL) 4 % cream Apply topically once daily as needed.      meloxicam (Mobic) 7.5 mg tablet Take 1 tablet (7.5 mg) by mouth once daily.      metoprolol succinate XL (Toprol-XL) 50 mg 24 hr tablet TAKE 0.5 TABLETS (25 MG) BY MOUTH 2 TIMES A DAY. 180 tablet 0    pravastatin (Pravachol) 20 mg tablet TAKE 1 TABLET (20 MG) BY MOUTH ONCE DAILY AT BEDTIME. 90 tablet 0     No current facility-administered medications for this visit.      Vitals:    08/22/24 1645   BP: 100/58   BP Location: Left arm   Patient Position: Sitting   Pulse: (!) 31   SpO2: 98%   Weight: 101 kg (223 lb 6.4 oz)   Height: 1.765 m (5' 9.5\")      Problem List Items Addressed This Visit       Benign essential hypertension    Hyperlipidemia    Atrial flutter (Multi) - Primary    Relevant Orders    ECG 12 Lead (Completed)    Other fatigue      Orders Placed This Encounter   Procedures    ECG 12 Lead     Order Specific Question:   Reason for Exam:     Answer:   abn heart rate        HPI  Presented today because he has been fatigue it is follow-up he is also noticing some icterus  He denies any palpitation  He is not short of breath as such  ROS no chest pain  No nausea vomiting bleeding  Past medical history reviewed  Social and family history reviewed  Allergies and medications reviewed  Recent labs reviewed  Vital signs reviewed    PHYSICAL EXAM  Noted to have a low blood pressure  Initially pulse rate was not noted to be normal  EKG done  Consistent with atrial flutter ventricular rate 150  bpm  Heart " "sounds regular chest clear abdomen soft nontender neuro awake alert  Recent lab has not been done      No results found for: \"PR1\", \"BMPR1A\", \"CMPLAS\", \"EA6USNES\", \"KPSAT\"   Lab Results   Component Value Date    CHOL 160 02/18/2022    CHHDL 2.6 02/18/2022                "

## 2024-08-22 NOTE — ED PROVIDER NOTES
EMERGENCY DEPARTMENT ENCOUNTER      Pt Name: Johann Casey  MRN: [id number]  Birthdate 1950  Date of evaluation: 8/22/2024    HISTORY OF PRESENT ILLNESS    Johann Casey is an 73 y.o. male with history including hyperlipidemia, hypertension, cataracts presenting to the emergency department for tachycardia sent from his primary care physician.  Patient has been having fatigue for months now and on Monday had an episode where he had some discoloration of his left fingertips prompting his wife to schedule an appointment with a primary care provider.  Upon presentation to his PCP they noted he was tachycardic and were having trouble getting accurate blood pressures on him at therefore he was subsequently sent to the ER.  On presentation patient is not complaining of any chest pain no shortness of breath at rest however he does feel short of breath upon exertion.  He has had a decreased appetite and a 10 pound weight loss over the last year but states that he has been cutting down on his drinking which he believes is the cause of this weight loss.  He notes that he used to drink 6-8 beers a day but over the last few months he has been been drinking 3-4 beers a day and since 8/18 he has been not drinking any.  No chest pain, fever, diarrhea, constipation, nausea/vomiting, abdominal pain, or any new swelling or numbness.      PAST MEDICAL HISTORY     Past Medical History:   Diagnosis Date    Disorder of the skin and subcutaneous tissue, unspecified     Skin lesion    Generalized skin eruption due to drugs and medicaments taken internally     Drug rash    Other specified abnormal findings of blood chemistry 02/25/2022    Abnormal LFTs    Other urticaria     Urticaria, acute    Strain of muscle, fascia and tendon of other parts of biceps, unspecified arm, initial encounter 02/25/2022    Biceps muscle tear       SURGICAL HISTORY       Past Surgical History:   Procedure Laterality Date    CATARACT EXTRACTION, BILATERAL Left  01/25/2024    CATARACT EXTRACTION, BILATERAL Right 01/18/2024    OTHER SURGICAL HISTORY  02/08/2022    Skin biopsy    OTHER SURGICAL HISTORY  02/08/2022    Tonsillectomy    OTHER SURGICAL HISTORY  03/01/2022    Vasectomy       CURRENT MEDICATIONS       Previous Medications    ASPIRIN 81 MG EC TABLET    Take 1 tablet (81 mg) by mouth once daily.    DESLORATADINE (CLARINEX) 5 MG TABLET    TAKE 1 TABLET BY MOUTH EVERY DAY    FLUTICASONE (FLONASE) 50 MCG/ACTUATION NASAL SPRAY    Administer 2 sprays into each nostril once daily.    HYDROCHLOROTHIAZIDE (MICROZIDE) 12.5 MG CAPSULE    TAKE 1 CAPSULE BY MOUTH ONCE DAILY.    LIDOCAINE (LIDOCAINE HCL) 4 % CREAM    Apply topically once daily as needed.    MELOXICAM (MOBIC) 7.5 MG TABLET    Take 1 tablet (7.5 mg) by mouth once daily.    METOPROLOL SUCCINATE XL (TOPROL-XL) 50 MG 24 HR TABLET    TAKE 0.5 TABLETS (25 MG) BY MOUTH 2 TIMES A DAY.    PRAVASTATIN (PRAVACHOL) 20 MG TABLET    TAKE 1 TABLET (20 MG) BY MOUTH ONCE DAILY AT BEDTIME.       ALLERGIES     Amoxicillin and Losartan    FAMILY HISTORY       Family History   Problem Relation Name Age of Onset    Hypertension Mother      Cancer Mother      Colon cancer Mother      Hypertension Father      Lung cancer Father      Cancer Father          SOCIAL HISTORY       Social History     Socioeconomic History    Marital status: Single   Tobacco Use    Smoking status: Never    Smokeless tobacco: Never   Substance and Sexual Activity    Alcohol use: Yes     Comment: daily 4 beers; last drink Sunday    Drug use: Never       PHYSICAL EXAM       ED Triage Vitals [08/22/24 1740]   Temperature Heart Rate Respirations BP   37.1 °C (98.8 °F) (!) 146 16 125/71      Pulse Ox Temp Source Heart Rate Source Patient Position   98 % Temporal -- --      BP Location FiO2 (%)     -- --       Physical Exam  Constitutional:       Appearance: Normal appearance.   HENT:      Head: Normocephalic and atraumatic.   Cardiovascular:      Rate and Rhythm:  Tachycardia present. Rhythm irregular.      Pulses: Normal pulses.      Comments: Difficult to appreciate regular heart sounds given fast ventricular rate  Pulmonary:      Effort: Pulmonary effort is normal.      Breath sounds: Normal breath sounds.   Abdominal:      General: Abdomen is flat. Bowel sounds are normal.      Palpations: Abdomen is soft.   Musculoskeletal:         General: Normal range of motion.      Cervical back: Normal range of motion.      Right lower leg: No edema.      Left lower leg: No edema.   Skin:     General: Skin is warm and dry.      Capillary Refill: Capillary refill takes less than 2 seconds.   Neurological:      General: No focal deficit present.      Mental Status: He is alert and oriented to person, place, and time.          DIAGNOSTIC RESULTS     LABS:  Labs Reviewed   CBC WITH AUTO DIFFERENTIAL - Abnormal       Result Value    WBC 9.6      nRBC 0.0      RBC 4.30 (*)     Hemoglobin 14.7      Hematocrit 43.8       (*)     MCH 34.2 (*)     MCHC 33.6      RDW 12.3      Platelets 171      Neutrophils % 68.0      Immature Granulocytes %, Automated 0.7      Lymphocytes % 15.0      Monocytes % 10.6      Eosinophils % 5.3      Basophils % 0.4      Neutrophils Absolute 6.53 (*)     Immature Granulocytes Absolute, Automated 0.07      Lymphocytes Absolute 1.44      Monocytes Absolute 1.02 (*)     Eosinophils Absolute 0.51 (*)     Basophils Absolute 0.04     COMPREHENSIVE METABOLIC PANEL - Abnormal    Glucose 118 (*)     Sodium 132 (*)     Potassium 3.8      Chloride 95 (*)     Bicarbonate 28      Anion Gap 13      Urea Nitrogen 18      Creatinine 1.36 (*)     eGFR 55 (*)     Calcium 9.3      Albumin 4.0      Alkaline Phosphatase 47      Total Protein 6.9      AST 25      Bilirubin, Total 1.6 (*)     ALT 29     B-TYPE NATRIURETIC PEPTIDE - Abnormal     (*)     Narrative:        <100 pg/mL - Heart failure unlikely  100-299 pg/mL - Intermediate probability of acute heart                   failure exacerbation. Correlate with clinical                  context and patient history.    >=300 pg/mL - Heart Failure likely. Correlate with clinical                  context and patient history.    BNP testing is performed using different testing methodology at [facility] than at other [facility]. Direct result comparisons should only be made within the same method.      PROTIME-INR - Abnormal    Protime 13.7 (*)     INR 1.2 (*)    SERIAL TROPONIN-INITIAL - Normal    Troponin I, High Sensitivity 10      Narrative:     Less than 99th percentile of normal range cutoff-  Female and children under 18 years old <14 ng/L; Male <21 ng/L: Negative  Repeat testing should be performed if clinically indicated.     Female and children under 18 years old 14-50 ng/L; Male 21-50 ng/L:  Consistent with possible cardiac damage and possible increased clinical   risk. Serial measurements may help to assess extent of myocardial damage.     >50 ng/L: Consistent with cardiac damage, increased clinical risk and  myocardial infarction. Serial measurements may help assess extent of   myocardial damage.      NOTE: Children less than 1 year old may have higher baseline troponin   levels and results should be interpreted in conjunction with the overall   clinical context.     NOTE: Troponin I testing is performed using a different   testing methodology at [facility] than at other   [facility]. Direct result comparisons should only   be made within the same method.   SERIAL TROPONIN, 1 HOUR - Normal    Troponin I, High Sensitivity 10      Narrative:     Less than 99th percentile of normal range cutoff-  Female and children under 18 years old <14 ng/L; Male <21 ng/L: Negative  Repeat testing should be performed if clinically indicated.     Female and children under 18 years old 14-50 ng/L; Male 21-50 ng/L:  Consistent with possible cardiac damage and possible increased clinical   risk. Serial  measurements may help to assess extent of myocardial damage.     >50 ng/L: Consistent with cardiac damage, increased clinical risk and  myocardial infarction. Serial measurements may help assess extent of   myocardial damage.      NOTE: Children less than 1 year old may have higher baseline troponin   levels and results should be interpreted in conjunction with the overall   clinical context.     NOTE: Troponin I testing is performed using a different   testing methodology at [facility] than at other   [facility]. Direct result comparisons should only   be made within the same method.   TSH WITH REFLEX TO FREE T4 IF ABNORMAL - Normal    Thyroid Stimulating Hormone 1.03      Narrative:     TSH testing is performed using different testing methodology at [facility] than at other [facility]. Direct result comparisons should only be made within the same method.     TROPONIN SERIES- (INITIAL, 1 HR)    Narrative:     The following orders were created for panel order Troponin I Series, High Sensitivity (0, 1 HR).  Procedure                               Abnormality         Status                     ---------                               -----------         ------                     Troponin I, High Sensiti...[[id number]]  Normal              Final result               Troponin, High Sensitivi...[[id number]]  Normal              Final result                 Please view results for these tests on the individual orders.       All other labs were within normal range or not returned as of this dictation.    Imaging  XR chest 1 view   Final Result   No acute cardiopulmonary abnormality.        Signed by: Jayy Mcdowell 8/22/2024 6:25 PM   Dictation workstation:   [id number]           Procedures  Procedures     EMERGENCY DEPARTMENT COURSE/MDM:   Medical Decision Making  Patient is a 73-year-old male presenting to the emergency department in atrial flutter versus atrial fibrillation with  a ventricular rate of 146.  Came in from his PCP where his wife scheduled him appointment for chronic fatigue as well as discoloration of his fingers that occurred on 8/18.  Patient endorses normally drinking 3-4 beers a day but stopped on 8/18.  He has not noticed any nausea/vomiting, headache, tremor or any other withdrawal symptoms since stopping drinking beer.   Workup to include a cardiac workup as well as thyroid testing.  Workup revealed no elevation of troponins.  Elevation of PT and INR as well as BNP (498).  Also shows a possible MAY with creatinine above baseline of 0.9 to 1.36.  Diltiazem started for A flutter/A-fib.  Received a 10 mg bolus as well as begun on 10 mL/h.  Patient's heart rate quickly responded to the Cardizem and went down to 77 and appears to be regular on telemetry therefore infusion rate dose changed to 5 mg/h at 1922.  With the patient's new onset atrial flutter will plan to admit to medicine for further evaluation.  Diagnoses as of 08/22/24 2019   Atrial flutter with rapid ventricular response (Multi)        External records reviewed: recent inpatient, clinic, and prior ED notes  Labs and Diagnostic imaging independently reviewed/interpreted by me.    Patient plan, care, lab results and imaging were all discussed with attending.    ED Medications administered this visit:    Medications   dilTIAZem (Cardizem) injection 10 mg (10 mg intravenous Given 8/22/24 5086)     New Prescriptions from this visit:    New Prescriptions    No medications on file       (Please note that portions of this note were completed with a voice recognition program.  Efforts were made to edit the dictations but occasionally words are mis-transcribed.)     Gael Hagan MD  Resident  08/22/24 2020

## 2024-08-23 ENCOUNTER — APPOINTMENT (OUTPATIENT)
Dept: CARDIOLOGY | Facility: HOSPITAL | Age: 74
DRG: 308 | End: 2024-08-23
Payer: MEDICARE

## 2024-08-23 PROBLEM — R05.9 COUGH: Status: RESOLVED | Noted: 2018-01-02 | Resolved: 2024-08-23

## 2024-08-23 PROBLEM — R60.0 PERIPHERAL EDEMA: Status: RESOLVED | Noted: 2024-08-23 | Resolved: 2024-08-23

## 2024-08-23 PROBLEM — R51.9 HEADACHE: Status: RESOLVED | Noted: 2018-01-02 | Resolved: 2024-08-23

## 2024-08-23 PROBLEM — L27.0 DERMATITIS MEDICAMENTOSA: Status: RESOLVED | Noted: 2024-08-23 | Resolved: 2024-08-23

## 2024-08-23 PROBLEM — R60.9 PERIPHERAL EDEMA: Status: RESOLVED | Noted: 2024-08-23 | Resolved: 2024-08-23

## 2024-08-23 LAB
ANION GAP SERPL CALC-SCNC: 16 MMOL/L (ref 10–20)
ATRIAL RATE: 107 BPM
ATRIAL RATE: 308 BPM
ATRIAL RATE: 308 BPM
BUN SERPL-MCNC: 15 MG/DL (ref 6–23)
CALCIUM SERPL-MCNC: 8.8 MG/DL (ref 8.6–10.3)
CHLORIDE SERPL-SCNC: 98 MMOL/L (ref 98–107)
CO2 SERPL-SCNC: 24 MMOL/L (ref 21–32)
CREAT SERPL-MCNC: 1.04 MG/DL (ref 0.5–1.3)
EGFRCR SERPLBLD CKD-EPI 2021: 76 ML/MIN/1.73M*2
EJECTION FRACTION APICAL 4 CHAMBER: 40.8
EJECTION FRACTION: 45 %
ERYTHROCYTE [DISTWIDTH] IN BLOOD BY AUTOMATED COUNT: 12 % (ref 11.5–14.5)
EST. AVERAGE GLUCOSE BLD GHB EST-MCNC: 108 MG/DL
GLUCOSE SERPL-MCNC: 89 MG/DL (ref 74–99)
HBA1C MFR BLD: 5.4 %
HCT VFR BLD AUTO: 42 % (ref 41–52)
HGB BLD-MCNC: 14 G/DL (ref 13.5–17.5)
LEFT VENTRICLE INTERNAL DIMENSION DIASTOLE: 4.99 CM (ref 3.5–6)
LV EJECTION FRACTION BIPLANE: 42 %
MAGNESIUM SERPL-MCNC: 1.87 MG/DL (ref 1.6–2.4)
MCH RBC QN AUTO: 33.9 PG (ref 26–34)
MCHC RBC AUTO-ENTMCNC: 33.3 G/DL (ref 32–36)
MCV RBC AUTO: 102 FL (ref 80–100)
NRBC BLD-RTO: 0 /100 WBCS (ref 0–0)
PLATELET # BLD AUTO: 101 X10*3/UL (ref 150–450)
POTASSIUM SERPL-SCNC: 3.5 MMOL/L (ref 3.5–5.3)
Q ONSET: 218 MS
Q ONSET: 220 MS
Q ONSET: 221 MS
QRS COUNT: 13 BEATS
QRS COUNT: 19 BEATS
QRS COUNT: 21 BEATS
QRS DURATION: 78 MS
QRS DURATION: 82 MS
QRS DURATION: 94 MS
QT INTERVAL: 318 MS
QT INTERVAL: 346 MS
QT INTERVAL: 396 MS
QTC CALCULATION(BAZETT): 456 MS
QTC CALCULATION(BAZETT): 467 MS
QTC CALCULATION(BAZETT): 480 MS
QTC FREDERICIA: 411 MS
QTC FREDERICIA: 431 MS
QTC FREDERICIA: 436 MS
R AXIS: 46 DEGREES
R AXIS: 50 DEGREES
R AXIS: 69 DEGREES
RBC # BLD AUTO: 4.13 X10*6/UL (ref 4.5–5.9)
SODIUM SERPL-SCNC: 134 MMOL/L (ref 136–145)
T AXIS: 60 DEGREES
T AXIS: 72 DEGREES
T AXIS: 90 DEGREES
T OFFSET: 380 MS
T OFFSET: 393 MS
T OFFSET: 416 MS
VENTRICULAR RATE: 116 BPM
VENTRICULAR RATE: 130 BPM
VENTRICULAR RATE: 80 BPM
WBC # BLD AUTO: 4.8 X10*3/UL (ref 4.4–11.3)

## 2024-08-23 PROCEDURE — 2500000004 HC RX 250 GENERAL PHARMACY W/ HCPCS (ALT 636 FOR OP/ED)

## 2024-08-23 PROCEDURE — 99223 1ST HOSP IP/OBS HIGH 75: CPT

## 2024-08-23 PROCEDURE — 2500000001 HC RX 250 WO HCPCS SELF ADMINISTERED DRUGS (ALT 637 FOR MEDICARE OP)

## 2024-08-23 PROCEDURE — 2060000001 HC INTERMEDIATE ICU ROOM DAILY

## 2024-08-23 PROCEDURE — 93005 ELECTROCARDIOGRAM TRACING: CPT

## 2024-08-23 PROCEDURE — 93010 ELECTROCARDIOGRAM REPORT: CPT | Performed by: STUDENT IN AN ORGANIZED HEALTH CARE EDUCATION/TRAINING PROGRAM

## 2024-08-23 PROCEDURE — 2500000001 HC RX 250 WO HCPCS SELF ADMINISTERED DRUGS (ALT 637 FOR MEDICARE OP): Performed by: STUDENT IN AN ORGANIZED HEALTH CARE EDUCATION/TRAINING PROGRAM

## 2024-08-23 PROCEDURE — 36415 COLL VENOUS BLD VENIPUNCTURE: CPT

## 2024-08-23 PROCEDURE — 99223 1ST HOSP IP/OBS HIGH 75: CPT | Performed by: STUDENT IN AN ORGANIZED HEALTH CARE EDUCATION/TRAINING PROGRAM

## 2024-08-23 PROCEDURE — 99222 1ST HOSP IP/OBS MODERATE 55: CPT | Performed by: INTERNAL MEDICINE

## 2024-08-23 PROCEDURE — 93308 TTE F-UP OR LMTD: CPT | Performed by: INTERNAL MEDICINE

## 2024-08-23 PROCEDURE — 2500000004 HC RX 250 GENERAL PHARMACY W/ HCPCS (ALT 636 FOR OP/ED): Performed by: INTERNAL MEDICINE

## 2024-08-23 PROCEDURE — 83735 ASSAY OF MAGNESIUM: CPT

## 2024-08-23 PROCEDURE — 83036 HEMOGLOBIN GLYCOSYLATED A1C: CPT | Mod: STJLAB | Performed by: STUDENT IN AN ORGANIZED HEALTH CARE EDUCATION/TRAINING PROGRAM

## 2024-08-23 PROCEDURE — 80048 BASIC METABOLIC PNL TOTAL CA: CPT

## 2024-08-23 PROCEDURE — 93308 TTE F-UP OR LMTD: CPT

## 2024-08-23 PROCEDURE — 85027 COMPLETE CBC AUTOMATED: CPT

## 2024-08-23 RX ORDER — METOPROLOL SUCCINATE 50 MG/1
50 TABLET, EXTENDED RELEASE ORAL 2 TIMES DAILY
Status: DISCONTINUED | OUTPATIENT
Start: 2024-08-23 | End: 2024-08-24 | Stop reason: HOSPADM

## 2024-08-23 RX ORDER — METOPROLOL SUCCINATE 25 MG/1
25 TABLET, EXTENDED RELEASE ORAL 2 TIMES DAILY
Status: DISCONTINUED | OUTPATIENT
Start: 2024-08-23 | End: 2024-08-23

## 2024-08-23 RX ORDER — MAGNESIUM SULFATE HEPTAHYDRATE 40 MG/ML
2 INJECTION, SOLUTION INTRAVENOUS ONCE
Status: COMPLETED | OUTPATIENT
Start: 2024-08-23 | End: 2024-08-23

## 2024-08-23 RX ORDER — ASPIRIN 81 MG/1
81 TABLET ORAL DAILY
Status: DISCONTINUED | OUTPATIENT
Start: 2024-08-23 | End: 2024-08-24 | Stop reason: HOSPADM

## 2024-08-23 RX ORDER — HYDROCHLOROTHIAZIDE 12.5 MG/1
12.5 TABLET ORAL DAILY
Status: DISCONTINUED | OUTPATIENT
Start: 2024-08-23 | End: 2024-08-24 | Stop reason: HOSPADM

## 2024-08-23 RX ORDER — METOPROLOL SUCCINATE 25 MG/1
25 TABLET, EXTENDED RELEASE ORAL ONCE
Status: COMPLETED | OUTPATIENT
Start: 2024-08-23 | End: 2024-08-23

## 2024-08-23 RX ORDER — DIGOXIN 0.25 MG/ML
500 INJECTION INTRAMUSCULAR; INTRAVENOUS ONCE
Status: COMPLETED | OUTPATIENT
Start: 2024-08-23 | End: 2024-08-23

## 2024-08-23 RX ORDER — METOPROLOL TARTRATE 25 MG/1
25 TABLET, FILM COATED ORAL 2 TIMES DAILY
Status: DISCONTINUED | OUTPATIENT
Start: 2024-08-23 | End: 2024-08-23

## 2024-08-23 RX ORDER — POTASSIUM CHLORIDE 1.5 G/1.58G
40 POWDER, FOR SOLUTION ORAL ONCE
Status: COMPLETED | OUTPATIENT
Start: 2024-08-23 | End: 2024-08-23

## 2024-08-23 RX ORDER — DIGOXIN 0.25 MG/ML
250 INJECTION INTRAMUSCULAR; INTRAVENOUS ONCE
Status: COMPLETED | OUTPATIENT
Start: 2024-08-23 | End: 2024-08-23

## 2024-08-23 RX ORDER — PRAVASTATIN SODIUM 20 MG/1
20 TABLET ORAL NIGHTLY
Status: DISCONTINUED | OUTPATIENT
Start: 2024-08-23 | End: 2024-08-24 | Stop reason: HOSPADM

## 2024-08-23 SDOH — ECONOMIC STABILITY: INCOME INSECURITY: HOW HARD IS IT FOR YOU TO PAY FOR THE VERY BASICS LIKE FOOD, HOUSING, MEDICAL CARE, AND HEATING?: NOT HARD AT ALL

## 2024-08-23 SDOH — ECONOMIC STABILITY: FOOD INSECURITY: WITHIN THE PAST 12 MONTHS, YOU WORRIED THAT YOUR FOOD WOULD RUN OUT BEFORE YOU GOT MONEY TO BUY MORE.: NEVER TRUE

## 2024-08-23 SDOH — ECONOMIC STABILITY: INCOME INSECURITY: IN THE PAST 12 MONTHS, HAS THE ELECTRIC, GAS, OIL, OR WATER COMPANY THREATENED TO SHUT OFF SERVICE IN YOUR HOME?: NO

## 2024-08-23 SDOH — ECONOMIC STABILITY: FOOD INSECURITY: WITHIN THE PAST 12 MONTHS, THE FOOD YOU BOUGHT JUST DIDN'T LAST AND YOU DIDN'T HAVE MONEY TO GET MORE.: NEVER TRUE

## 2024-08-23 SDOH — ECONOMIC STABILITY: HOUSING INSECURITY: AT ANY TIME IN THE PAST 12 MONTHS, WERE YOU HOMELESS OR LIVING IN A SHELTER (INCLUDING NOW)?: NO

## 2024-08-23 SDOH — ECONOMIC STABILITY: TRANSPORTATION INSECURITY
IN THE PAST 12 MONTHS, HAS THE LACK OF TRANSPORTATION KEPT YOU FROM MEDICAL APPOINTMENTS OR FROM GETTING MEDICATIONS?: NO

## 2024-08-23 SDOH — ECONOMIC STABILITY: TRANSPORTATION INSECURITY
IN THE PAST 12 MONTHS, HAS LACK OF TRANSPORTATION KEPT YOU FROM MEETINGS, WORK, OR FROM GETTING THINGS NEEDED FOR DAILY LIVING?: NO

## 2024-08-23 SDOH — ECONOMIC STABILITY: INCOME INSECURITY: IN THE LAST 12 MONTHS, WAS THERE A TIME WHEN YOU WERE NOT ABLE TO PAY THE MORTGAGE OR RENT ON TIME?: NO

## 2024-08-23 SDOH — ECONOMIC STABILITY: HOUSING INSECURITY: IN THE PAST 12 MONTHS, HOW MANY TIMES HAVE YOU MOVED WHERE YOU WERE LIVING?: 0

## 2024-08-23 ASSESSMENT — COGNITIVE AND FUNCTIONAL STATUS - GENERAL
MOBILITY SCORE: 24
DAILY ACTIVITIY SCORE: 24

## 2024-08-23 ASSESSMENT — PAIN - FUNCTIONAL ASSESSMENT
PAIN_FUNCTIONAL_ASSESSMENT: 0-10

## 2024-08-23 ASSESSMENT — PAIN SCALES - GENERAL
PAINLEVEL_OUTOF10: 0 - NO PAIN

## 2024-08-23 ASSESSMENT — ENCOUNTER SYMPTOMS
ARTHRALGIAS: 1
DYSURIA: 0
CARDIOVASCULAR NEGATIVE: 1
DIFFICULTY URINATING: 1
NEUROLOGICAL NEGATIVE: 1
GASTROINTESTINAL NEGATIVE: 1
SLEEP DISTURBANCE: 1
SHORTNESS OF BREATH: 1
FATIGUE: 1

## 2024-08-23 ASSESSMENT — ACTIVITIES OF DAILY LIVING (ADL): LACK_OF_TRANSPORTATION: NO

## 2024-08-23 NOTE — CONSULTS
Inpatient consult to Cardiology  Consult performed by: Kat Vincent MD  Consult ordered by: Radha Soto MD  Reason for consult: Atrial flutter          Cardiology Consult Note      Date:   8/23/2024  Patient name:  Johann Casey  Date of admission:  8/22/2024  5:27 PM  MRN:   [id number]  YOB: 1950  Time of Consult:  12:57 PM    Consulting Cardiologist: Dr. Kat Vincent        Primary Cardiologist:  Dr. Fred Badillo    Referring Provider: Dr. Soto      Admission Diagnosis:     Atrial flutter with rapid ventricular response (Multi)    History of Present Illness:      Johann Casey is a 73 y.o.  male patient who is being at the request of Dr. Soto for inpatient consultation of arrhythmia. He was admitted on 8/22/2024.  Previous NO and EHR records have been reviewed in detail.      Patient seen by me earlier today for atrial flutter.  He and his wife describe a 3 to 4 months period of time with decreasing exercise tolerance and shortness of breath on exertion.  Normally over the summer months he is anxious to get out and work in his yard and do different activities but this summer he has just come home from work and wanted to just sit in his chair.  While he is shortness of breath on exertion he has no orthopnea or PND or lower extremity edema.  He said no awareness of any rapid or irregular heartbeats or any dizziness or lightheadedness or even chest discomfort.    Yesterday the patient's wife was just concerned because he becomes more and more listless and less interested in activities and she felt his color was very poor.  She took him to the primary care physician's office where they noted his heart rate was very rapid and referred him to the emergency room.  In the emergency room he was diagnosed with atrial flutter with variable conduction.  He remains in A-fib and a flutter at this time with more controlled heart rates.  He feels no different at this time.    He has known  history of cardiac arrhythmia and no history of previous myocardial infarction.  He had an echocardiogram showing normal LV function and a nuclear stress test within the past year that were both normal function and low risk and the only abnormality seen is some aortic root dilation.  He has a history of substantial alcohol intake but is cut back but according to his wife she feel he is still drinking too much alcohol.  He states he is now drinking 3-4 beverages a day.    Past medical and surgical history were reviewed.    Allergies:     Allergies   Allergen Reactions    Amoxicillin Rash    Losartan Rash       Past Medical History:     Past Medical History:   Diagnosis Date    Cough 01/02/2018    Dermatitis medicamentosa 08/23/2024    Disorder of the skin and subcutaneous tissue, unspecified     Skin lesion    Generalized skin eruption due to drugs and medicaments taken internally     Drug rash    Headache 01/02/2018    Other specified abnormal findings of blood chemistry 02/25/2022    Abnormal LFTs    Other urticaria     Urticaria, acute    Peripheral edema 08/23/2024    Strain of muscle, fascia and tendon of other parts of biceps, unspecified arm, initial encounter 02/25/2022    Biceps muscle tear       Past Surgical History:     Past Surgical History:   Procedure Laterality Date    CATARACT EXTRACTION, BILATERAL Left 01/25/2024    CATARACT EXTRACTION, BILATERAL Right 01/18/2024    OTHER SURGICAL HISTORY  02/08/2022    Skin biopsy    OTHER SURGICAL HISTORY  02/08/2022    Tonsillectomy    OTHER SURGICAL HISTORY  03/01/2022    Vasectomy       Family History:     Family History   Problem Relation Name Age of Onset    Hypertension Mother      Cancer Mother      Colon cancer Mother      Hypertension Father      Lung cancer Father      Cancer Father         Social History:     Social History     Socioeconomic History    Marital status: Single   Tobacco Use    Smoking status: Never    Smokeless tobacco: Never   Substance  and Sexual Activity    Alcohol use: Yes     Comment: daily 4 beers; last drink Sunday    Drug use: Never     Social Determinants of Health     Financial Resource Strain: Low Risk  (8/23/2024)    Overall Financial Resource Strain (CARDIA)     Difficulty of Paying Living Expenses: Not hard at all   Food Insecurity: No Food Insecurity (8/23/2024)    Hunger Vital Sign     Worried About Running Out of Food in the Last Year: Never true     Ran Out of Food in the Last Year: Never true   Transportation Needs: No Transportation Needs (8/23/2024)    PRAPARE - Transportation     Lack of Transportation (Medical): No     Lack of Transportation (Non-Medical): No   Housing Stability: Low Risk  (8/23/2024)    Housing Stability Vital Sign     Unable to Pay for Housing in the Last Year: No     Number of Times Moved in the Last Year: 0     Homeless in the Last Year: No       Home Medications:     Prior to Admission medications    Medication Sig Start Date End Date Taking? Authorizing Provider   aspirin 81 mg EC tablet Take 1 tablet (81 mg) by mouth once daily.   Yes Historical Provider, MD   desloratadine (Clarinex) 5 mg tablet TAKE 1 TABLET BY MOUTH EVERY DAY 5/20/24  Yes Bg Yanez MD   hydroCHLOROthiazide (Microzide) 12.5 mg capsule TAKE 1 CAPSULE BY MOUTH ONCE DAILY. 8/14/24  Yes Bg Yanez MD   metoprolol succinate XL (Toprol-XL) 50 mg 24 hr tablet TAKE 0.5 TABLETS (25 MG) BY MOUTH 2 TIMES A DAY. 8/14/24  Yes Bg Yanez MD   pravastatin (Pravachol) 20 mg tablet TAKE 1 TABLET (20 MG) BY MOUTH ONCE DAILY AT BEDTIME. 8/14/24  Yes Bg Yanez MD   fluticasone (Flonase) 50 mcg/actuation nasal spray Administer 2 sprays into each nostril once daily. 3/27/23 8/22/24  Historical Provider, MD   lidocaine (lidocaine HCL) 4 % cream Apply topically once daily as needed. 7/1/22 8/22/24  Historical Provider, MD   losartan (Cozaar) 50 mg tablet Take 1 tablet (50 mg) by mouth once daily.  8/22/24  Historical Provider, MD    meloxicam (Mobic) 7.5 mg tablet Take 1 tablet (7.5 mg) by mouth once daily. 2/28/23 8/22/24  Historical Provider, MD       Current Medications:     Current Outpatient Medications   Medication Instructions    aspirin 81 mg EC tablet 1 tablet, oral, Daily    desloratadine (CLARINEX) 5 mg, oral, Daily    hydroCHLOROthiazide (MICROZIDE) 12.5 mg, oral, Daily    metoprolol succinate XL (TOPROL-XL) 25 mg, oral, 2 times daily    pravastatin (PRAVACHOL) 20 mg, oral, Nightly        IV Medications:          Review of Systems:      Review of Systems   Constitutional:  Positive for fatigue.   Respiratory:  Positive for shortness of breath.    Cardiovascular: Negative.    Gastrointestinal: Negative.    Genitourinary:  Positive for difficulty urinating. Negative for dysuria.   Musculoskeletal:  Positive for arthralgias.   Neurological: Negative.    Psychiatric/Behavioral:  Positive for sleep disturbance.    All other systems reviewed and are negative.      Vital Signs:     Vitals:    08/23/24 0423 08/23/24 0800 08/23/24 0922 08/23/24 1220   BP: 142/89 124/85 140/87 142/62   BP Location: Left arm Left arm  Left arm   Patient Position: Lying Lying  Sitting   Pulse: 74 84 (!) 125 (!) 123   Resp: 12 16  20   Temp: 36.4 °C (97.5 °F) 36.2 °C (97.2 °F)     TempSrc: Temporal Temporal     SpO2: 95% 98%  99%   Weight:       Height:           Intake/Output Summary (Last 24 hours) at 8/23/2024 1257  Last data filed at 8/23/2024 0059  Gross per 24 hour   Intake 511 ml   Output --   Net 511 ml     Vitals:    08/22/24 2117   Weight: 100 kg (220 lb 14.4 oz)       Physical Examination:     GENERAL APPEARANCE: Well developed, well nourished, in no acute distress.  HEENT: No gross abnormalities of conjunctiva, teeth, gums, oral mucosa  NECK: Supple, no JVD, no bruit. Thyroid not palpable. Carotid upstrokes normal.  CHEST: Symmetric and non-tender.  LUNGS: Clear to auscultation bilaterally; normal respiratory effort.  HEART: PMI is nondisplaced.   "There is a regular rhythm occasionally irregular and mildly tachycardic.  There is a normal S1 and S2 without S3 or appreciable murmur.  ABDOMEN: Soft, nontender, no palpable hepatosplenomegaly, no mases, no bruits. Abdominal aorta not noted to be enlarged.  MUSCULOSKELETAL: Ambulatory with normal tandem gait.  EXTREMITIES: Warm with good color, no clubbing or cyanois. There is no edema noted.  PERIPHERAL VASCULAR: Pulses present and equally palpable; 2+ throughout. No femoral bruits.  NEURO/PSHCY: Alert and oriented x3; appropriate behavior and responses and responses, grossly normal cerebellar function with normal balance and coordination  INTEGUMENT: Skin warm and dry, without gross excoriationis or lesions.  LYMPH: No significant palpable lymphadenopathy      Lab:     CBC:   Lab Results   Component Value Date    WBC 4.8 08/23/2024    RBC 4.13 (L) 08/23/2024    HGB 14.0 08/23/2024    HCT 42.0 08/23/2024     (L) 08/23/2024        CMP:    Lab Results   Component Value Date     (L) 08/23/2024    K 3.5 08/23/2024    CL 98 08/23/2024    CO2 24 08/23/2024    BUN 15 08/23/2024    CREATININE 1.04 08/23/2024    GLUCOSE 89 08/23/2024    CALCIUM 8.8 08/23/2024       Magnesium:    No results found for: \"MG\"    Lipid Profile:    No results found for: \"CHLPL\", \"TRIG\", \"HDL\", \"LDLCALC\", \"LDLDIRECT\"    TSH:    Lab Results   Component Value Date    TSH 1.03 08/22/2024       BNP:   Lab Results   Component Value Date     (H) 08/22/2024        PT/INR:    Lab Results   Component Value Date    PROTIME 13.7 (H) 08/22/2024    INR 1.2 (H) 08/22/2024       HgBA1c:    Lab Results   Component Value Date    HGBA1C 6.2 (A) 02/18/2022       BMP:  Lab Results   Component Value Date     (L) 08/23/2024     (L) 08/22/2024    K 3.5 08/23/2024    K 3.8 08/22/2024    CL 98 08/23/2024    CL 95 (L) 08/22/2024    CO2 24 08/23/2024    CO2 28 08/22/2024    BUN 15 08/23/2024    BUN 18 08/22/2024    CREATININE 1.04 08/23/2024 "    CREATININE 1.36 (H) 08/22/2024       CBC:  Lab Results   Component Value Date    WBC 4.8 08/23/2024    WBC 9.6 08/22/2024    RBC 4.13 (L) 08/23/2024    RBC 4.30 (L) 08/22/2024    HGB 14.0 08/23/2024    HGB 14.7 08/22/2024    HCT 42.0 08/23/2024    HCT 43.8 08/22/2024     (H) 08/23/2024     (H) 08/22/2024    MCH 33.9 08/23/2024    MCH 34.2 (H) 08/22/2024    MCHC 33.3 08/23/2024    MCHC 33.6 08/22/2024    RDW 12.0 08/23/2024    RDW 12.3 08/22/2024     (L) 08/23/2024     08/22/2024       Cardiac Enzymes:    Lab Results   Component Value Date    TROPHS 10 08/22/2024    TROPHS 10 08/22/2024    TROPHS 5 08/18/2023       Hepatic Function Panel:    Lab Results   Component Value Date    ALKPHOS 47 08/22/2024    ALT 29 08/22/2024    AST 25 08/22/2024    PROT 6.9 08/22/2024    BILITOT 1.6 (H) 08/22/2024         Diagnostic Studies:     ECG 12 Lead    Result Date: 8/23/2024  Atrial flutter with variable AV block Nonspecific ST abnormality Abnormal ECG When compared with ECG of 22-AUG-2024 18:55, (unconfirmed) Previous ECG has undetermined rhythm, needs review Nonspecific T wave abnormality no longer evident in Inferior leads    ECG 12 lead    Result Date: 8/23/2024  Undetermined rhythm Otherwise normal ECG When compared with ECG of 22-AUG-2024 17:32, (unconfirmed) Current undetermined rhythm precludes rhythm comparison, needs review    ECG 12 lead    Result Date: 8/23/2024  Atrial flutter with variable AV block Nonspecific ST and T wave abnormality Abnormal ECG When compared with ECG of 18-AUG-2023 18:45, Atrial flutter has replaced Sinus rhythm Vent. rate has increased BY  72 BPM Nonspecific T wave abnormality now evident in Lateral leads    XR chest 1 view    Result Date: 8/22/2024  Interpreted By:  Jayy Mcdowell, STUDY: XR CHEST 1 VIEW;  8/22/2024 6:10 pm   INDICATION: Signs/Symptoms:Chest Pain.   COMPARISON: 08/18/2023   ACCESSION NUMBER(S): [id number]   ORDERING CLINICIAN: MEAGAN VARGAS    FINDINGS: No consolidation. No pleural effusion or pneumothorax. Normal heart size. No acute osseous abnormality. Stable 4 mm right mid lung zone and 5 mm left lower lung zone pulmonary nodules.       No acute cardiopulmonary abnormality.   Signed by: Jayy Mcdowell 8/22/2024 6:25 PM Dictation workstation:   [id number]    ECG 12 Lead    Result Date: 8/22/2024  Atrial flutter with varying AV block with ventricular rate 148 bpm      Radiology:     XR chest 1 view   Final Result   No acute cardiopulmonary abnormality.        Signed by: Jayy Mcdowell 8/22/2024 6:25 PM   Dictation workstation:   [id number]      Transthoracic Echo (TTE) Limited    (Results Pending)       Assessment:     Problem List Items Addressed This Visit       Atrial flutter (Multi)    Relevant Medications    dilTIAZem (Cardizem) injection 10 mg (Completed)    metoprolol succinate XL (Toprol-XL) 24 hr tablet 50 mg (Start on 8/23/2024  9:00 PM)    metoprolol succinate XL (Toprol-XL) 24 hr tablet 25 mg (Completed)    digoxin (Lanoxin) injection 500 mcg (Start on 8/23/2024  1:15 PM)    digoxin (Lanoxin) injection 250 mcg (Start on 8/23/2024  7:00 PM)    * (Principal) Atrial flutter with rapid ventricular response (Multi) - Primary    Relevant Medications    dilTIAZem (Cardizem) injection 10 mg (Completed)    metoprolol succinate XL (Toprol-XL) 24 hr tablet 50 mg (Start on 8/23/2024  9:00 PM)    metoprolol succinate XL (Toprol-XL) 24 hr tablet 25 mg (Completed)    digoxin (Lanoxin) injection 500 mcg (Start on 8/23/2024  1:15 PM)    digoxin (Lanoxin) injection 250 mcg (Start on 8/23/2024  7:00 PM)    Other Relevant Orders    Transthoracic Echo (TTE) Limited    Exertional dyspnea    Relevant Orders    Transthoracic Echo (TTE) Limited       Patient Active Problem List   Diagnosis    Abnormal EKG    Abnormal urine cytology    Actinic keratosis    Other seborrheic keratosis    [facility] coronary artery calcium score less than 100    Alcohol abuse     "Allergic rhinitis    Basal cell carcinoma of skin of other part of trunk    Benign essential hypertension    Benign keratosis of skin    Other melanin hyperpigmentation    Biceps muscle tear    Bowel dysfunction    DJD of right shoulder    Elevated PSA    Eosinophilia    Erectile dysfunction    Fatty liver    Hepatomegaly    Gingivitis    Hemangioma of skin and subcutaneous tissue    Hyperglycemia    Hyperlipidemia    Infection of mouth    Osteoarthritis of both knees    Lumbosacral radiculopathy at L1    Right knee DJD    Melanocytic nevi of trunk    Neoplasm of uncertain behavior of skin    Obesity (BMI 30-39.9)    PVC (premature ventricular contraction)    Shortness of breath    Ventricular arrhythmia    Melanocytic nevi of unspecified upper limb, including shoulder    Melanocytic nevi of lower limb, including hip    Bilateral leg edema    CAD (coronary artery disease)    Class 1 obesity with body mass index (BMI) of 33.0 to 33.9 in adult    Skin lesion    Aortic root aneurysm (CMS-HCC)    Never smoked cigarettes    Amblyopia suspect, left eye    Asymmetry of optic nerve of both eyes    Dermatochalasis of both upper eyelids    Early dry stage nonexudative age-related macular degeneration of both eyes    Enlarged aorta (CMS-HCC)    Pseudophakia    Regular astigmatism of left eye    Encounter for colorectal cancer screening    Atrial flutter (Multi)    Other fatigue    Atrial flutter with rapid ventricular response (Multi)    Exertional dyspnea       Plan:     A flutter / Fib ( 1 EKG more a fib \"like\"). Asymptomatic of unknown duration. Unable to cardiovert today as LILIANA is unavailable. Increase rate control, continue oral eliquis. If HR controlled may be DC tomorrow and can try to expedite with LILIANA/CV next week. Needs ETOH cessation / decrease and outpatient sleep study for triggers for atrial arrhythmias (wife describes sleep apnea type symptoms). Echo today to make sure no acute change in LV function.   HTN - BP " borderline on admission, controlled now.   Aortic root aneurysm / dilation - seen by echo last year. Will assess with this echo.   SOB - likely from arrhythmia but cannot rule out tachycardia induced CM. Echo will address.     Will make final recommendations pending echo. Plan DC home tomorrow if HR are acceptable. Discussed plans with patient and his wife who was at the bedside.     Thank you for the opportunity to participate in the care of your patient.  Please do not hesitate to call if you have any questions.    Electronically signed by Kat Vincent MD, on 8/23/2024 at 12:57 PM

## 2024-08-23 NOTE — H&P (VIEW-ONLY)
History Of Present Illness      Johann Casey is a 73 y.o. male presenting from his PCP's office due to new onset atrial flutter and tachycardia. Patient's chief complain for visiting his PCP's office was 2-3 months of increasing fatigue on exertion. His PMH is significant for HLD, HTN, and dilation of his aorta (9/23/23).     Patient states his symptoms began a few months ago when he noticed increasing fatigue. He used to mow the lawn in a few hours and now it takes half a day due to him having to take so many rest breaks Over the ensuing months his fatigue increased to the point where he would just sit and watch TV and was sleeping much longer than he normally did. During this time he was also drinking 3-4 light beers a day, which he as recently cut down to 1-2 beers/day.     Patients wife informed me that over the last few weeks his fingertips have been going yellow/white and his eyes have been becoming yellow. This greatly concerned the Pts wife who scheduled a visit with his PCP. It was during this visit with his PCP that the atrial flutter was noticed and patient was sent to the ED.     He presented to the ED with a HR of 146 and BP of 125/71. Upon admission repeat EKG showed tachycardic atrial flutter w/ variable AV block. He was not experiencing chest pain or dyspnea at this time. Was feeling nauseous. His troponin was not elevated    He has never had diagnosed heart disease. He had a cardiac stress test and TTE about a year ago (9/23/23) which showed a minor dilated aorta but nothing else. He has never had chest pain or dyspnea in the past and still denies any chest pain. He has never had a history of arrythmia and has no Fmx of heart disease.       ED Course:  Vitals: T 98.8, , RR 16, /71  CBC: Neutrophilia  INR: 1.2 INR and 13.7 Protime  CMP: Cr of 1.36 which is above baseline  BNP: 498  Troponin: Unremarkable    Imaging:   EKG: Atrial flutter with variable AV block  CXR: Unremarkable      Intervention:   -Cardizem 125mg infusion  -Cardizem 10mg injection       Patient was admitted under teaching service to CICU with telemetry for further testing.     Relevant Results  Labs this admission reviewed  Imagings this admission reviewed  Cultures: Reviewed    Surgical History  He has a past surgical history that includes Other surgical history (02/08/2022); Other surgical history (02/08/2022); Other surgical history (03/01/2022); Cataract extraction, bilateral (Left, 01/25/2024); and Cataract extraction, bilateral (Right, 01/18/2024).     Social History  He reports that he has never smoked. He has never used smokeless tobacco. He reports current alcohol use. He reports that he does not use drugs.    Family History  Family History   Problem Relation Name Age of Onset    Hypertension Mother      Cancer Mother      Colon cancer Mother      Hypertension Father      Lung cancer Father      Cancer Father          Allergies  Amoxicillin and Losartan    Review of Systems   Constitutional:  Positive for activity change (Decreased activity), appetite change (Decresed appetite), diaphoresis and fatigue. Negative for chills, fever and unexpected weight change.   HENT:  Negative for hearing loss and sore throat.    Respiratory:  Positive for shortness of breath. Negative for cough, chest tightness, wheezing and stridor.    Cardiovascular:  Negative for chest pain, palpitations and leg swelling.   Gastrointestinal:  Positive for nausea (On exertion). Negative for abdominal distention, abdominal pain, blood in stool, constipation, diarrhea and vomiting.   Genitourinary:  Negative for difficulty urinating and dysuria.   Musculoskeletal:  Positive for joint swelling (Bilateral knee). Negative for arthralgias.   Neurological:  Positive for weakness. Negative for dizziness, tremors, syncope, facial asymmetry, light-headedness, numbness and headaches.   Psychiatric/Behavioral:  Negative for confusion.         Physical  Exam  Constitutional:       Appearance: Normal appearance.   HENT:      Head: Normocephalic.      Nose: Nose normal.      Mouth/Throat:      Mouth: Mucous membranes are moist.      Pharynx: Oropharynx is clear.   Eyes:      Extraocular Movements: Extraocular movements intact.      Pupils: Pupils are equal, round, and reactive to light.   Cardiovascular:      Rate and Rhythm: Normal rate and regular rhythm.      Pulses: Normal pulses.      Heart sounds: Normal heart sounds.   Pulmonary:      Effort: Pulmonary effort is normal.      Breath sounds: Normal breath sounds.   Abdominal:      General: Abdomen is flat. Bowel sounds are normal.      Palpations: Abdomen is soft.   Musculoskeletal:      Cervical back: Normal range of motion.   Skin:     General: Skin is warm and dry.      Capillary Refill: Capillary refill takes 2 to 3 seconds.   Neurological:      General: No focal deficit present.      Mental Status: He is alert and oriented to person, place, and time.   Psychiatric:         Mood and Affect: Mood normal.         Behavior: Behavior normal.         Thought Content: Thought content normal.         Judgment: Judgment normal.          Last Recorded Vitals  /65   Pulse 76   Temp 37.1 °C (98.8 °F) (Temporal)   Resp (!) 22   Wt 101 kg (223 lb)   SpO2 96%     Relevant Results  Scheduled medications  enoxaparin, 40 mg, subcutaneous, q24h      Continuous medications     PRN medications    Results for orders placed or performed during the hospital encounter of 08/22/24 (from the past 24 hour(s))   CBC with Differential   Result Value Ref Range    WBC 9.6 4.4 - 11.3 x10*3/uL    nRBC 0.0 0.0 - 0.0 /100 WBCs    RBC 4.30 (L) 4.50 - 5.90 x10*6/uL    Hemoglobin 14.7 13.5 - 17.5 g/dL    Hematocrit 43.8 41.0 - 52.0 %     (H) 80 - 100 fL    MCH 34.2 (H) 26.0 - 34.0 pg    MCHC 33.6 32.0 - 36.0 g/dL    RDW 12.3 11.5 - 14.5 %    Platelets 171 150 - 450 x10*3/uL    Neutrophils % 68.0 40.0 - 80.0 %    Immature  Granulocytes %, Automated 0.7 0.0 - 0.9 %    Lymphocytes % 15.0 13.0 - 44.0 %    Monocytes % 10.6 2.0 - 10.0 %    Eosinophils % 5.3 0.0 - 6.0 %    Basophils % 0.4 0.0 - 2.0 %    Neutrophils Absolute 6.53 (H) 1.60 - 5.50 x10*3/uL    Immature Granulocytes Absolute, Automated 0.07 0.00 - 0.50 x10*3/uL    Lymphocytes Absolute 1.44 0.80 - 3.00 x10*3/uL    Monocytes Absolute 1.02 (H) 0.05 - 0.80 x10*3/uL    Eosinophils Absolute 0.51 (H) 0.00 - 0.40 x10*3/uL    Basophils Absolute 0.04 0.00 - 0.10 x10*3/uL   Comprehensive Metabolic Panel   Result Value Ref Range    Glucose 118 (H) 74 - 99 mg/dL    Sodium 132 (L) 136 - 145 mmol/L    Potassium 3.8 3.5 - 5.3 mmol/L    Chloride 95 (L) 98 - 107 mmol/L    Bicarbonate 28 21 - 32 mmol/L    Anion Gap 13 10 - 20 mmol/L    Urea Nitrogen 18 6 - 23 mg/dL    Creatinine 1.36 (H) 0.50 - 1.30 mg/dL    eGFR 55 (L) >60 mL/min/1.73m*2    Calcium 9.3 8.6 - 10.3 mg/dL    Albumin 4.0 3.4 - 5.0 g/dL    Alkaline Phosphatase 47 33 - 136 U/L    Total Protein 6.9 6.4 - 8.2 g/dL    AST 25 9 - 39 U/L    Bilirubin, Total 1.6 (H) 0.0 - 1.2 mg/dL    ALT 29 10 - 52 U/L   Brain Natriuretic Peptide   Result Value Ref Range     (H) 0 - 99 pg/mL   Protime-INR   Result Value Ref Range    Protime 13.7 (H) 9.8 - 12.8 seconds    INR 1.2 (H) 0.9 - 1.1   Troponin I, High Sensitivity, Initial   Result Value Ref Range    Troponin I, High Sensitivity 10 0 - 20 ng/L   TSH with reflex to Free T4 if abnormal   Result Value Ref Range    Thyroid Stimulating Hormone 1.03 0.44 - 3.98 mIU/L   Troponin, High Sensitivity, 1 Hour   Result Value Ref Range    Troponin I, High Sensitivity 10 0 - 20 ng/L     XR chest 1 view    Result Date: 8/22/2024  Interpreted By:  Jayy Mcdowell, STUDY: XR CHEST 1 VIEW;  8/22/2024 6:10 pm   INDICATION: Signs/Symptoms:Chest Pain.   COMPARISON: 08/18/2023   ACCESSION NUMBER(S): [id number]   ORDERING CLINICIAN: MEAGAN VARGAS   FINDINGS: No consolidation. No pleural effusion or  pneumothorax. Normal heart size. No acute osseous abnormality. Stable 4 mm right mid lung zone and 5 mm left lower lung zone pulmonary nodules.       No acute cardiopulmonary abnormality.   Signed by: Jayy Mcdowell 8/22/2024 6:25 PM Dictation workstation:   [id number]    ECG 12 Lead    Result Date: 8/22/2024  Atrial flutter with varying AV block with ventricular rate 148 bpm          Assessment/Plan   Johann Casey is a 73 year old male presenting from his PCP's office due to new onset atrial flutter with variable AV block. His PMH is significant for HTN, HLD, 3-4 beers/day Hx, and slight aortic root dilation. He has been experiencing fatigue w/o chest pain/dyspnea for 2-3 months, this week his wife noticed his fingertips and eyes changing colors so she scheduled him a visit with his PCP. At the PCP office the arrhythmia was noticed and Pt brought to [facility] where he was given Cardizem which converted his rhythm. Will continue to monitor patient and follow up with TTE to ensure proper cardiac function.   Assessment & Plan  Atrial flutter with rapid ventricular response (Multi)  #Atrial flutter with variable AV block vs atrial fibrillation   Spontaneous atrial flutter could be secondary to alcohol withdrawal vs undiagnosed CAD or CHF vs idiopathic  -HR in the 140s upon admission ---> dropped to 76 after CCB Tx  -Patient in normal sinus rhythm and rate after CCB. Says he is feeling much better  -Normal EKG on 8/18/23  -Newest EKG: HR of 116, QRS of 78ms, QT/QTcB of 346/480  -BNP: 498    Plan:   -Telemetry   -Discontinued Cardizem s/p spontaneous return to normal sinus rhythm  -If arrhythmia returns and HR<130 will give oral Metoprolol  -If arrhythmia returns and HR>130 will give IV Metoprolol per standard dosing  -If arrhythmia refractory to CCB's will consider digoxin or amiodarone  -Limited TTE: Pending  -Will need cardiac event monitor  -To follow up outpatient with Dr. Badillo      #pre-renal MAY w/ +/- ischemic  ATN  Most likely secondary to hypoperfusion from atrial flutter. Continue cardiac rhythm control and monitor Cr  -Cr of 1.36 (8/22), baseline is 0.9  -BUN:Cr of 13 ---> Intrinsic   -EGFR: 55    Plan:   -500 LR bolus  -Repeat CMP: Pending  -Avoid nephrotoxic drugs      #EtOH Use  Patient drank 3-4 beers a day for decades and started to decrease his intake once the fatigue began. He hasn't had a drink since Monday. He does not complain of any withdrawal symptoms and has not hx of withdrawal. He is out of window of developing any withdrawal symptoms, therefore will not start CIWA protocol.     Plan:   -Unlikely to need benzodiazepines, will monitor Pt for now      #Hyperbilirubinemia  Most likely secondary to alcohol intake  -Bilirubin total 1.6, previous 0.9 one year ago  -ALT and AST both normal    Plan:   -Asymptomatic w/o concerning elevations in ALT/AST/Alk-phos, will monitor labs for now      Other Chronic Conditions:   #HTN  #HLD  -Continue home meds as possible      Diet: Adult regular  DVT ppx: Lovenox  Fluids: 500 LR bolus  On Telemetry  Code Status: Full Code      To be staffed with attending physician,   DAKOTA GUTIERREZ  MS3    Senior Addendum:    Patient is a 73-year-old male with PMHx of HTN, HLD, CAD w/o stents, aortic root aneurysm, hepatic steatosis who presented from PCP office for tachycardia and ECG showing atrial flutter. Patient was seeing PCP for ongoing fatigue over the summer months. Patient states he has lost 20-30lbs over 6-9 months with dietary changes and has dramatically reduced EtOh consumption to a few beers a week mostly at night. He does not know of any prior episodes of a.fib/a.flutter. He does endorse exertional sob that is new over the last 3 months and has intermittent lower ankle swelling. He denies having any palpitations, chest pain, or dizziness. Patient follows with Dr. Badillo outpatient and has yearly follow up in October. Dr. Badillo follows for a mild aortic root aneurysm of  3.9 cm that is being watched. Also follows for HTN. He had a nuclear stress test in 2023 that was normal and EF was 55-60% at that time.     In the ED, patient was tachycardic with rates in the 140s and 150s but otherwise blood pressures were normal and patient was on room air.  ECG was concerning for atrial fibrillation with RVR versus atrial flutter.  Labs showed a creatinine of 1.36, potassium 3.8, , and troponin 10.  A chest x-ray was negative.  Patient was started on diltiazem bolus and drip which did acutely drop his blood pressures to 88/55 and patient spontaneously converted to normal sinus rhythm within 30 minutes of initiating drip.  Admitted for new onset arrhythmia.     On physical exam, patient is in no acute distress and resting comfortably.  Does not appear fluid overloaded with peripheral edema or JVD or crackles.  On auscultation heart sounds are regular rhythm and rate without murmur.  Lungs are clear to auscultation bilaterally.  Abdominal exam is benign without ascites.    Overall, this is a 73-year-old male with PMHx of HTN, HLD, CAD w/o stents, aortic root aneurysm presenting with presumed new onset atrial arrhythmia that looks most consistent with atrial flutter rather than atrial fibrillation. He has since converted back to NSR after Cardizem bolus in ED, which was discontinued on admission. Looking at possible triggering events and given subacute fatigue and exertional dyspnea I think a limited ECHO is reasonable.   I do not think he is fluid overloaded, if anything a little dry, the IVC was very collapsible during my limited bedside ECHO. Think MAY likely pre-renal and will trial small fluid bolus. Pt will need cardiac event monitor on discharge.     Verified FULL CODE    To be staffed with attending,  José Marinelli, DO  PGY-2

## 2024-08-23 NOTE — ASSESSMENT & PLAN NOTE
#Atrial flutter with variable AV block vs atrial fibrillation   Spontaneous atrial flutter could be secondary to alcohol withdrawal vs undiagnosed CAD or CHF vs idiopathic  -HR in the 140s upon admission ---> dropped to 76 after CCB Tx  -Patient in normal sinus rhythm and rate after CCB. Says he is feeling much better  -Normal EKG on 8/18/23  -Newest EKG: HR of 116, QRS of 78ms, QT/QTcB of 346/480  -BNP: 498    Plan:   -Telemetry   -Discontinued Cardizem s/p spontaneous return to normal sinus rhythm  -If arrhythmia returns and HR<130 will give oral Metoprolol  -If arrhythmia returns and HR>130 will give IV Metoprolol per standard dosing  -If arrhythmia refractory to CCB's will consider digoxin or amiodarone  -Limited TTE: Pending  -Will need cardiac event monitor  -To follow up outpatient with Dr. Badillo      #pre-renal MAY w/ +/- ischemic ATN  Most likely secondary to hypoperfusion from atrial flutter. Continue cardiac rhythm control and monitor Cr  -Cr of 1.36 (8/22), baseline is 0.9  -BUN:Cr of 13 ---> Intrinsic   -EGFR: 55    Plan:   -500 LR bolus  -Repeat CMP: Pending  -Avoid nephrotoxic drugs      #EtOH Use  Patient drank 3-4 beers a day for decades and started to decrease his intake once the fatigue began. He hasn't had a drink since Monday. He does not complain of any withdrawal symptoms and has not hx of withdrawal. He is out of window of developing any withdrawal symptoms, therefore will not start CIWA protocol.     Plan:   -Unlikely to need benzodiazepines, will monitor Pt for now      #Hyperbilirubinemia  Most likely secondary to alcohol intake  -Bilirubin total 1.6, previous 0.9 one year ago  -ALT and AST both normal    Plan:   -Asymptomatic w/o concerning elevations in ALT/AST/Alk-phos, will monitor labs for now

## 2024-08-23 NOTE — H&P
History Of Present Illness      Johann Casey is a 73 y.o. male presenting from his PCP's office due to new onset atrial flutter and tachycardia. Patient's chief complain for visiting his PCP's office was 2-3 months of increasing fatigue on exertion. His PMH is significant for HLD, HTN, and dilation of his aorta (9/23/23).     Patient states his symptoms began a few months ago when he noticed increasing fatigue. He used to mow the lawn in a few hours and now it takes half a day due to him having to take so many rest breaks Over the ensuing months his fatigue increased to the point where he would just sit and watch TV and was sleeping much longer than he normally did. During this time he was also drinking 3-4 light beers a day, which he as recently cut down to 1-2 beers/day.     Patients wife informed me that over the last few weeks his fingertips have been going yellow/white and his eyes have been becoming yellow. This greatly concerned the Pts wife who scheduled a visit with his PCP. It was during this visit with his PCP that the atrial flutter was noticed and patient was sent to the ED.     He presented to the ED with a HR of 146 and BP of 125/71. Upon admission repeat EKG showed tachycardic atrial flutter w/ variable AV block. He was not experiencing chest pain or dyspnea at this time. Was feeling nauseous. His troponin was not elevated    He has never had diagnosed heart disease. He had a cardiac stress test and TTE about a year ago (9/23/23) which showed a minor dilated aorta but nothing else. He has never had chest pain or dyspnea in the past and still denies any chest pain. He has never had a history of arrythmia and has no Fmx of heart disease.       ED Course:  Vitals: T 98.8, , RR 16, /71  CBC: Neutrophilia  INR: 1.2 INR and 13.7 Protime  CMP: Cr of 1.36 which is above baseline  BNP: 498  Troponin: Unremarkable    Imaging:   EKG: Atrial flutter with variable AV block  CXR: Unremarkable      Intervention:   -Cardizem 125mg infusion  -Cardizem 10mg injection       Patient was admitted under teaching service to CICU with telemetry for further testing.     Relevant Results  Labs this admission reviewed  Imagings this admission reviewed  Cultures: Reviewed    Surgical History  He has a past surgical history that includes Other surgical history (02/08/2022); Other surgical history (02/08/2022); Other surgical history (03/01/2022); Cataract extraction, bilateral (Left, 01/25/2024); and Cataract extraction, bilateral (Right, 01/18/2024).     Social History  He reports that he has never smoked. He has never used smokeless tobacco. He reports current alcohol use. He reports that he does not use drugs.    Family History  Family History   Problem Relation Name Age of Onset    Hypertension Mother      Cancer Mother      Colon cancer Mother      Hypertension Father      Lung cancer Father      Cancer Father          Allergies  Amoxicillin and Losartan    Review of Systems   Constitutional:  Positive for activity change (Decreased activity), appetite change (Decresed appetite), diaphoresis and fatigue. Negative for chills, fever and unexpected weight change.   HENT:  Negative for hearing loss and sore throat.    Respiratory:  Positive for shortness of breath. Negative for cough, chest tightness, wheezing and stridor.    Cardiovascular:  Negative for chest pain, palpitations and leg swelling.   Gastrointestinal:  Positive for nausea (On exertion). Negative for abdominal distention, abdominal pain, blood in stool, constipation, diarrhea and vomiting.   Genitourinary:  Negative for difficulty urinating and dysuria.   Musculoskeletal:  Positive for joint swelling (Bilateral knee). Negative for arthralgias.   Neurological:  Positive for weakness. Negative for dizziness, tremors, syncope, facial asymmetry, light-headedness, numbness and headaches.   Psychiatric/Behavioral:  Negative for confusion.         Physical  Exam  Constitutional:       Appearance: Normal appearance.   HENT:      Head: Normocephalic.      Nose: Nose normal.      Mouth/Throat:      Mouth: Mucous membranes are moist.      Pharynx: Oropharynx is clear.   Eyes:      Extraocular Movements: Extraocular movements intact.      Pupils: Pupils are equal, round, and reactive to light.   Cardiovascular:      Rate and Rhythm: Normal rate and regular rhythm.      Pulses: Normal pulses.      Heart sounds: Normal heart sounds.   Pulmonary:      Effort: Pulmonary effort is normal.      Breath sounds: Normal breath sounds.   Abdominal:      General: Abdomen is flat. Bowel sounds are normal.      Palpations: Abdomen is soft.   Musculoskeletal:      Cervical back: Normal range of motion.   Skin:     General: Skin is warm and dry.      Capillary Refill: Capillary refill takes 2 to 3 seconds.   Neurological:      General: No focal deficit present.      Mental Status: He is alert and oriented to person, place, and time.   Psychiatric:         Mood and Affect: Mood normal.         Behavior: Behavior normal.         Thought Content: Thought content normal.         Judgment: Judgment normal.          Last Recorded Vitals  /65   Pulse 76   Temp 37.1 °C (98.8 °F) (Temporal)   Resp (!) 22   Wt 101 kg (223 lb)   SpO2 96%     Relevant Results  Scheduled medications  enoxaparin, 40 mg, subcutaneous, q24h      Continuous medications     PRN medications    Results for orders placed or performed during the hospital encounter of 08/22/24 (from the past 24 hour(s))   CBC with Differential   Result Value Ref Range    WBC 9.6 4.4 - 11.3 x10*3/uL    nRBC 0.0 0.0 - 0.0 /100 WBCs    RBC 4.30 (L) 4.50 - 5.90 x10*6/uL    Hemoglobin 14.7 13.5 - 17.5 g/dL    Hematocrit 43.8 41.0 - 52.0 %     (H) 80 - 100 fL    MCH 34.2 (H) 26.0 - 34.0 pg    MCHC 33.6 32.0 - 36.0 g/dL    RDW 12.3 11.5 - 14.5 %    Platelets 171 150 - 450 x10*3/uL    Neutrophils % 68.0 40.0 - 80.0 %    Immature  Granulocytes %, Automated 0.7 0.0 - 0.9 %    Lymphocytes % 15.0 13.0 - 44.0 %    Monocytes % 10.6 2.0 - 10.0 %    Eosinophils % 5.3 0.0 - 6.0 %    Basophils % 0.4 0.0 - 2.0 %    Neutrophils Absolute 6.53 (H) 1.60 - 5.50 x10*3/uL    Immature Granulocytes Absolute, Automated 0.07 0.00 - 0.50 x10*3/uL    Lymphocytes Absolute 1.44 0.80 - 3.00 x10*3/uL    Monocytes Absolute 1.02 (H) 0.05 - 0.80 x10*3/uL    Eosinophils Absolute 0.51 (H) 0.00 - 0.40 x10*3/uL    Basophils Absolute 0.04 0.00 - 0.10 x10*3/uL   Comprehensive Metabolic Panel   Result Value Ref Range    Glucose 118 (H) 74 - 99 mg/dL    Sodium 132 (L) 136 - 145 mmol/L    Potassium 3.8 3.5 - 5.3 mmol/L    Chloride 95 (L) 98 - 107 mmol/L    Bicarbonate 28 21 - 32 mmol/L    Anion Gap 13 10 - 20 mmol/L    Urea Nitrogen 18 6 - 23 mg/dL    Creatinine 1.36 (H) 0.50 - 1.30 mg/dL    eGFR 55 (L) >60 mL/min/1.73m*2    Calcium 9.3 8.6 - 10.3 mg/dL    Albumin 4.0 3.4 - 5.0 g/dL    Alkaline Phosphatase 47 33 - 136 U/L    Total Protein 6.9 6.4 - 8.2 g/dL    AST 25 9 - 39 U/L    Bilirubin, Total 1.6 (H) 0.0 - 1.2 mg/dL    ALT 29 10 - 52 U/L   Brain Natriuretic Peptide   Result Value Ref Range     (H) 0 - 99 pg/mL   Protime-INR   Result Value Ref Range    Protime 13.7 (H) 9.8 - 12.8 seconds    INR 1.2 (H) 0.9 - 1.1   Troponin I, High Sensitivity, Initial   Result Value Ref Range    Troponin I, High Sensitivity 10 0 - 20 ng/L   TSH with reflex to Free T4 if abnormal   Result Value Ref Range    Thyroid Stimulating Hormone 1.03 0.44 - 3.98 mIU/L   Troponin, High Sensitivity, 1 Hour   Result Value Ref Range    Troponin I, High Sensitivity 10 0 - 20 ng/L     XR chest 1 view    Result Date: 8/22/2024  Interpreted By:  Jayy Mcdowell, STUDY: XR CHEST 1 VIEW;  8/22/2024 6:10 pm   INDICATION: Signs/Symptoms:Chest Pain.   COMPARISON: 08/18/2023   ACCESSION NUMBER(S): [id number]   ORDERING CLINICIAN: MEAGAN VARGAS   FINDINGS: No consolidation. No pleural effusion or  pneumothorax. Normal heart size. No acute osseous abnormality. Stable 4 mm right mid lung zone and 5 mm left lower lung zone pulmonary nodules.       No acute cardiopulmonary abnormality.   Signed by: Jayy Mcdowell 8/22/2024 6:25 PM Dictation workstation:   [id number]    ECG 12 Lead    Result Date: 8/22/2024  Atrial flutter with varying AV block with ventricular rate 148 bpm          Assessment/Plan   Johann Casey is a 73 year old male presenting from his PCP's office due to new onset atrial flutter with variable AV block. His PMH is significant for HTN, HLD, 3-4 beers/day Hx, and slight aortic root dilation. He has been experiencing fatigue w/o chest pain/dyspnea for 2-3 months, this week his wife noticed his fingertips and eyes changing colors so she scheduled him a visit with his PCP. At the PCP office the arrhythmia was noticed and Pt brought to [facility] where he was given Cardizem which converted his rhythm. Will continue to monitor patient and follow up with TTE to ensure proper cardiac function.   Assessment & Plan  Atrial flutter with rapid ventricular response (Multi)  #Atrial flutter with variable AV block vs atrial fibrillation   Spontaneous atrial flutter could be secondary to alcohol withdrawal vs undiagnosed CAD or CHF vs idiopathic  -HR in the 140s upon admission ---> dropped to 76 after CCB Tx  -Patient in normal sinus rhythm and rate after CCB. Says he is feeling much better  -Normal EKG on 8/18/23  -Newest EKG: HR of 116, QRS of 78ms, QT/QTcB of 346/480  -BNP: 498    Plan:   -Telemetry   -Discontinued Cardizem s/p spontaneous return to normal sinus rhythm  -If arrhythmia returns and HR<130 will give oral Metoprolol  -If arrhythmia returns and HR>130 will give IV Metoprolol per standard dosing  -If arrhythmia refractory to CCB's will consider digoxin or amiodarone  -Limited TTE: Pending  -Will need cardiac event monitor  -To follow up outpatient with Dr. Badillo      #pre-renal MAY w/ +/- ischemic  ATN  Most likely secondary to hypoperfusion from atrial flutter. Continue cardiac rhythm control and monitor Cr  -Cr of 1.36 (8/22), baseline is 0.9  -BUN:Cr of 13 ---> Intrinsic   -EGFR: 55    Plan:   -500 LR bolus  -Repeat CMP: Pending  -Avoid nephrotoxic drugs      #EtOH Use  Patient drank 3-4 beers a day for decades and started to decrease his intake once the fatigue began. He hasn't had a drink since Monday. He does not complain of any withdrawal symptoms and has not hx of withdrawal. He is out of window of developing any withdrawal symptoms, therefore will not start CIWA protocol.     Plan:   -Unlikely to need benzodiazepines, will monitor Pt for now      #Hyperbilirubinemia  Most likely secondary to alcohol intake  -Bilirubin total 1.6, previous 0.9 one year ago  -ALT and AST both normal    Plan:   -Asymptomatic w/o concerning elevations in ALT/AST/Alk-phos, will monitor labs for now      Other Chronic Conditions:   #HTN  #HLD  -Continue home meds as possible      Diet: Adult regular  DVT ppx: Lovenox  Fluids: 500 LR bolus  On Telemetry  Code Status: Full Code      To be staffed with attending physician,   DAKOTA GUTIERREZ  MS3    Senior Addendum:    Patient is a 73-year-old male with PMHx of HTN, HLD, CAD w/o stents, aortic root aneurysm, hepatic steatosis who presented from PCP office for tachycardia and ECG showing atrial flutter. Patient was seeing PCP for ongoing fatigue over the summer months. Patient states he has lost 20-30lbs over 6-9 months with dietary changes and has dramatically reduced EtOh consumption to a few beers a week mostly at night. He does not know of any prior episodes of a.fib/a.flutter. He does endorse exertional sob that is new over the last 3 months and has intermittent lower ankle swelling. He denies having any palpitations, chest pain, or dizziness. Patient follows with Dr. Badillo outpatient and has yearly follow up in October. Dr. Badillo follows for a mild aortic root aneurysm of  3.9 cm that is being watched. Also follows for HTN. He had a nuclear stress test in 2023 that was normal and EF was 55-60% at that time.     In the ED, patient was tachycardic with rates in the 140s and 150s but otherwise blood pressures were normal and patient was on room air.  ECG was concerning for atrial fibrillation with RVR versus atrial flutter.  Labs showed a creatinine of 1.36, potassium 3.8, , and troponin 10.  A chest x-ray was negative.  Patient was started on diltiazem bolus and drip which did acutely drop his blood pressures to 88/55 and patient spontaneously converted to normal sinus rhythm within 30 minutes of initiating drip.  Admitted for new onset arrhythmia.     On physical exam, patient is in no acute distress and resting comfortably.  Does not appear fluid overloaded with peripheral edema or JVD or crackles.  On auscultation heart sounds are regular rhythm and rate without murmur.  Lungs are clear to auscultation bilaterally.  Abdominal exam is benign without ascites.    Overall, this is a 73-year-old male with PMHx of HTN, HLD, CAD w/o stents, aortic root aneurysm presenting with presumed new onset atrial arrhythmia that looks most consistent with atrial flutter rather than atrial fibrillation. He has since converted back to NSR after Cardizem bolus in ED, which was discontinued on admission. Looking at possible triggering events and given subacute fatigue and exertional dyspnea I think a limited ECHO is reasonable.   I do not think he is fluid overloaded, if anything a little dry, the IVC was very collapsible during my limited bedside ECHO. Think MAY likely pre-renal and will trial small fluid bolus. Pt will need cardiac event monitor on discharge.     Verified FULL CODE    To be staffed with attending,  José Marinelli, DO  PGY-2

## 2024-08-23 NOTE — CARE PLAN
The patient's goals for the shift include      Problem: Pain - Adult  Goal: Verbalizes/displays adequate comfort level or baseline comfort level  Outcome: Progressing     Problem: Safety - Adult  Goal: Free from fall injury  Outcome: Progressing     Problem: Discharge Planning  Goal: Discharge to home or other facility with appropriate resources  Outcome: Progressing     Problem: Chronic Conditions and Co-morbidities  Goal: Patient's chronic conditions and co-morbidity symptoms are monitored and maintained or improved  Outcome: Progressing     Problem: Atrial Fibrillation  Goal: I will have no complications due to atrial fibrillation  Outcome: Progressing       The clinical goals for the shift include pt will convert to NSR during this shift    Patient has remained hemodynamically stable and cooperative with care. Eliquis and Digoxin started for atrial flutter management. Patient and wife educated on Diagnosis and medication.

## 2024-08-23 NOTE — CARE PLAN
Pt admitted to room 2100 during this shift. Alert and oriented. Denies complaints of chest pain or SOB. Afib on tele, HR controlled in 70s-80s. Safety maintained, will continue to monitor     Problem: Pain - Adult  Goal: Verbalizes/displays adequate comfort level or baseline comfort level  Outcome: Progressing     Problem: Safety - Adult  Goal: Free from fall injury  Outcome: Progressing     Problem: Discharge Planning  Goal: Discharge to home or other facility with appropriate resources  Outcome: Progressing     Problem: Chronic Conditions and Co-morbidities  Goal: Patient's chronic conditions and co-morbidity symptoms are monitored and maintained or improved  Outcome: Progressing     Problem: Atrial Fibrillation  Goal: I will have no complications due to atrial fibrillation  Outcome: Progressing

## 2024-08-23 NOTE — PROGRESS NOTES
08/23/24 1247   Discharge Planning   Living Arrangements Spouse/significant other;Children   Support Systems Spouse/significant other;Children   Assistance Needed none   Type of Residence Private residence   Number of Stairs to Enter Residence 1   Number of Stairs Within Residence 0   Do you have animals or pets at home? Yes   Type of Animals or Pets 3 dogs   Who is requesting discharge planning? Provider   Home or Post Acute Services None   Expected Discharge Disposition Home   Does the patient need discharge transport arranged? No   Financial Resource Strain   How hard is it for you to pay for the very basics like food, housing, medical care, and heating? Not hard   Housing Stability   In the last 12 months, was there a time when you were not able to pay the mortgage or rent on time? N   In the past 12 months, how many times have you moved where you were living? 0   At any time in the past 12 months, were you homeless or living in a shelter (including now)? N   Transportation Needs   In the past 12 months, has lack of transportation kept you from medical appointments or from getting medications? no   In the past 12 months, has lack of transportation kept you from meetings, work, or from getting things needed for daily living? No   Patient Choice   Patient / Family choosing to utilize agency / facility established prior to hospitalization No     Met with  patient and spouse at bedside. Verified address and PCP. Uses [facility] in [place] for medications and has no issues obtaining them, spouse manages medications and patient is really not educated on why taking. No DME is used, still drives, still works full time and is independent with all ADL's. T this time patient has no needs and will return home on discharge. CT team will follow.

## 2024-08-23 NOTE — CONSULTS
Cardiac Electrophysiology Consult     Chief complaint: Shortness of breath  Referred by: Dr. Vincent    HPI  Johann Casey is a 73 y.o. year old male patient with h/o HTN, HLD, who presented with few months of worsening shortness of breath and fatigue.  Patient has been having increased fatigue mostly noticed by his wife and was very apprehensive about seeking medical attention.  His wife finally convinced him to seek medical attention and patient was found to have atrial flutter with RVR. Of note patient's wife also reports that he drinks alcohol daily from a few drinks a day to sometimes a sixpack.  Patient denies any chest pain dizziness or loss of consciousness  EP being consulted for management of atrial flutter/fibrillation.    family history includes Cancer in his father and mother; Colon cancer in his mother; Hypertension in his father and mother; Lung cancer in his father.      Allergies:  Allergies   Allergen Reactions    Amoxicillin Rash    Losartan Rash        Medications:  Current Outpatient Medications   Medication Instructions    aspirin 81 mg EC tablet 1 tablet, oral, Daily    desloratadine (CLARINEX) 5 mg, oral, Daily    hydroCHLOROthiazide (MICROZIDE) 12.5 mg, oral, Daily    metoprolol succinate XL (TOPROL-XL) 25 mg, oral, 2 times daily    pravastatin (PRAVACHOL) 20 mg, oral, Nightly      Scheduled medications   Medication Dose Route Frequency    apixaban  5 mg oral q12h    aspirin  81 mg oral Daily    digoxin  250 mcg intravenous Once    [Held by provider] hydroCHLOROthiazide  12.5 mg oral Daily    magnesium sulfate  2 g intravenous Once    metoprolol succinate XL  50 mg oral BID    pravastatin  20 mg oral Nightly        Last Recorded Vitals:      8/22/2024     9:17 PM 8/23/2024    12:00 AM 8/23/2024     4:23 AM 8/23/2024     8:00 AM 8/23/2024     9:22 AM 8/23/2024    12:20 PM 8/23/2024     1:40 PM   Vitals   Systolic 132 114 142 124 140 142    Diastolic 86 68 89 85 87 62    Heart Rate 76  "76 74 84 125 123 77   Temp 36.4 °C (97.5 °F) 36.5 °C (97.7 °F) 36.4 °C (97.5 °F) 36.2 °C (97.2 °F)      Resp 18 16 12 16  20    Height (in) 1.803 m (5' 11\")         Weight (lb) 220.9         BMI 30.81 kg/m2         BSA (m2) 2.24 m2         Visit Report Report             Physical Exam  Constitutional:       Appearance: Normal appearance.   HENT:      Head: Normocephalic.   Cardiovascular:      Rate and Rhythm: Normal rate. Rhythm irregular.      Pulses: Normal pulses.   Pulmonary:      Effort: No respiratory distress.      Breath sounds: No wheezing.   Skin:     General: Skin is warm and dry.      Capillary Refill: Capillary refill takes less than 2 seconds.   Neurological:      Mental Status: He is alert.   Psychiatric:         Mood and Affect: Mood normal.           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  Patient current rate controlled on metoprolol.  Since he has not been on anticoagulation prior will need LILIANA or CTA watchman prior to cardioversion.  Rhythm control is the best ready at this time for both cardiomyopathy and given patient's young age.  Agree with planned LILIANA +/- cardioversion  Long-term needs rhythm control AAD versus ablation  Metoprolol XL 50 mg twice daily  Anticoagulation: Apixaban 5 mg twice daily  EP follow-up as outpatient to discuss long-term rhythm control options  Counseled patient and wife about alcohol use and concerns about cardiotoxicity from alcohol consumption    Code status: Full Code    I spent 32 minutes in the professional and overall care of this patient.    Jase Lopez, " MD [facility]  Cardiac Electrophysiology    Thank you very much for allowing me to participate in the care of this pleasant patient. Please do not hesitate to contact me with any further questions or concerns regarding their care.    **Disclaimer: This note was dictated by speech recognition, and every effort has been made to prevent any error in transcription, however minor errors may be present**

## 2024-08-24 VITALS
HEIGHT: 71 IN | HEART RATE: 76 BPM | BODY MASS INDEX: 30.93 KG/M2 | OXYGEN SATURATION: 98 % | WEIGHT: 220.9 LBS | TEMPERATURE: 97.7 F | RESPIRATION RATE: 16 BRPM | DIASTOLIC BLOOD PRESSURE: 78 MMHG | SYSTOLIC BLOOD PRESSURE: 158 MMHG

## 2024-08-24 PROBLEM — I10 HTN (HYPERTENSION): Status: ACTIVE | Noted: 2024-08-24

## 2024-08-24 PROBLEM — I50.20 HFREF (HEART FAILURE WITH REDUCED EJECTION FRACTION) (MULTI): Status: ACTIVE | Noted: 2024-08-24

## 2024-08-24 LAB
ALBUMIN SERPL BCP-MCNC: 3.4 G/DL (ref 3.4–5)
ANION GAP SERPL CALC-SCNC: 14 MMOL/L (ref 10–20)
BUN SERPL-MCNC: 16 MG/DL (ref 6–23)
CALCIUM SERPL-MCNC: 8.9 MG/DL (ref 8.6–10.3)
CHLORIDE SERPL-SCNC: 101 MMOL/L (ref 98–107)
CO2 SERPL-SCNC: 24 MMOL/L (ref 21–32)
CREAT SERPL-MCNC: 1.07 MG/DL (ref 0.5–1.3)
EGFRCR SERPLBLD CKD-EPI 2021: 74 ML/MIN/1.73M*2
ERYTHROCYTE [DISTWIDTH] IN BLOOD BY AUTOMATED COUNT: 11.9 % (ref 11.5–14.5)
GLUCOSE SERPL-MCNC: 96 MG/DL (ref 74–99)
HCT VFR BLD AUTO: 41.9 % (ref 41–52)
HGB BLD-MCNC: 14.2 G/DL (ref 13.5–17.5)
MAGNESIUM SERPL-MCNC: 2.04 MG/DL (ref 1.6–2.4)
MCH RBC QN AUTO: 34 PG (ref 26–34)
MCHC RBC AUTO-ENTMCNC: 33.9 G/DL (ref 32–36)
MCV RBC AUTO: 100 FL (ref 80–100)
NRBC BLD-RTO: 0 /100 WBCS (ref 0–0)
PHOSPHATE SERPL-MCNC: 3.9 MG/DL (ref 2.5–4.9)
PLATELET # BLD AUTO: 129 X10*3/UL (ref 150–450)
POTASSIUM SERPL-SCNC: 3.8 MMOL/L (ref 3.5–5.3)
RBC # BLD AUTO: 4.18 X10*6/UL (ref 4.5–5.9)
SODIUM SERPL-SCNC: 135 MMOL/L (ref 136–145)
WBC # BLD AUTO: 6 X10*3/UL (ref 4.4–11.3)

## 2024-08-24 PROCEDURE — 99232 SBSQ HOSP IP/OBS MODERATE 35: CPT | Performed by: INTERNAL MEDICINE

## 2024-08-24 PROCEDURE — 85027 COMPLETE CBC AUTOMATED: CPT

## 2024-08-24 PROCEDURE — 83735 ASSAY OF MAGNESIUM: CPT

## 2024-08-24 PROCEDURE — 2500000001 HC RX 250 WO HCPCS SELF ADMINISTERED DRUGS (ALT 637 FOR MEDICARE OP)

## 2024-08-24 PROCEDURE — 80069 RENAL FUNCTION PANEL: CPT

## 2024-08-24 PROCEDURE — 2500000001 HC RX 250 WO HCPCS SELF ADMINISTERED DRUGS (ALT 637 FOR MEDICARE OP): Performed by: STUDENT IN AN ORGANIZED HEALTH CARE EDUCATION/TRAINING PROGRAM

## 2024-08-24 PROCEDURE — 2500000002 HC RX 250 W HCPCS SELF ADMINISTERED DRUGS (ALT 637 FOR MEDICARE OP, ALT 636 FOR OP/ED)

## 2024-08-24 PROCEDURE — 36415 COLL VENOUS BLD VENIPUNCTURE: CPT

## 2024-08-24 PROCEDURE — 99239 HOSP IP/OBS DSCHRG MGMT >30: CPT

## 2024-08-24 RX ORDER — POTASSIUM CHLORIDE 20 MEQ/1
40 TABLET, EXTENDED RELEASE ORAL ONCE
Status: COMPLETED | OUTPATIENT
Start: 2024-08-24 | End: 2024-08-24

## 2024-08-24 RX ORDER — LOSARTAN POTASSIUM 25 MG/1
25 TABLET ORAL DAILY
Status: DISCONTINUED | OUTPATIENT
Start: 2024-08-24 | End: 2024-08-24

## 2024-08-24 RX ORDER — LISINOPRIL 20 MG/1
20 TABLET ORAL DAILY
Status: DISCONTINUED | OUTPATIENT
Start: 2024-08-24 | End: 2024-08-24 | Stop reason: HOSPADM

## 2024-08-24 RX ORDER — LISINOPRIL 20 MG/1
20 TABLET ORAL DAILY
Qty: 30 TABLET | Refills: 0 | Status: SHIPPED | OUTPATIENT
Start: 2024-08-25

## 2024-08-24 RX ORDER — METOPROLOL SUCCINATE 50 MG/1
50 TABLET, EXTENDED RELEASE ORAL 2 TIMES DAILY
Qty: 60 TABLET | Refills: 0 | Status: SHIPPED | OUTPATIENT
Start: 2024-08-24 | End: 2024-09-23

## 2024-08-24 ASSESSMENT — COGNITIVE AND FUNCTIONAL STATUS - GENERAL
MOBILITY SCORE: 24
DAILY ACTIVITIY SCORE: 24

## 2024-08-24 ASSESSMENT — PAIN SCALES - GENERAL
PAINLEVEL_OUTOF10: 0 - NO PAIN

## 2024-08-24 ASSESSMENT — PAIN - FUNCTIONAL ASSESSMENT
PAIN_FUNCTIONAL_ASSESSMENT: 0-10

## 2024-08-24 NOTE — HOSPITAL COURSE
Johann Casey is a 73 y.o. male  with PMHx of HTN, HLD, CAD w/o stents, aortic root aneurysm, hepatic steatosis presenting from his PCP's office due to new onset atrial flutter and tachycardia. Patient's chief complain for visiting his PCP's office was 2-3 months of increasing fatigue and SOB  with exertion. He reported drinking 3-4 light beers a day, which he as recently cut down to 1-2 beers/day.  In ED he was tachycardic however his blood pressure stable 125/71, his EKG showed tachycardic atrial flutter with variable AV block.  His troponin was negative, BNP was 498 as well as he found to have MAY creatinine 1.36.  He was admitted for new onset atrial flutter which may have been triggered by heavy alcohol use or dehydration.  During his hospital stay the pulse rate was controlled by metoprolol and there is controlled with digoxin, anticoagulant given his Chads2 Vasc score  2   cardiology and EP was consulted who recommended echocardiogram which showed :Left ventricular ejection fraction is mildly decreased, by visual estimate at 45% and global hypokinesis of the left ventricle with minor regional variations.  After heart rate was controlled cardiology recommended LILIANA to exclude appendage thrombus before cardioversion, additionally he recommended long-term need for rhythm control as well as increase metoprolol dose.  Patient condition improved and his heart rate was controlled and he was discharged in a good condition

## 2024-08-24 NOTE — PROGRESS NOTES
Cardiology Progress Note           Rounding Cardiologist:  Dr. Kat Vincent  Primary Cardiologist: Dr. Fred Badillo    Date:  8/24/2024  Patient:  Johann Casey  YOB: 1950  MRN:  [id number]   Admit Date:  8/22/2024      SUBJECTIVE    Johann Casey was seen and examined today at bedside.     Patient did well overnight without any chest pain, shortness of breath, dizziness or lightheadedness.  He is anxious to be discharged today.  He feels well and has no other complaints.  Both he and his wife had several questions about arranging the outpatient transesophageal echocardiogram and cardioversion.  He states he lives only 3 minutes for [facility] and if possible he preferred to have the procedure done there.  This and several other questions were answered for the patient.  We also described the procedure of transesophageal echocardiogram and verified the patient has no history of esophageal disorders or difficulty swallowing.  We described cardioversion and potential complications from cardioversion.  We discussed follow-up with electrophysiology, Dr. Lopez and plans for possible ablation or antiarrhythmic therapy.  This would be decided when he follows up with Dr. Lopez in the office.    Review of Systems   No new complaints  VITALS     Vitals:    08/23/24 1936 08/24/24 0000 08/24/24 0345 08/24/24 0756   BP: 126/62 121/67 (!) 160/93 (!) 150/93   BP Location: Left arm Left arm Left arm Left arm   Patient Position: Lying Lying Sitting Lying   Pulse: 92 76 78 78   Resp: 18 14 20 20   Temp: 36.3 °C (97.3 °F) 36.4 °C (97.5 °F) 36.6 °C (97.9 °F) 36.5 °C (97.7 °F)   TempSrc: Temporal Temporal Temporal Temporal   SpO2: 96% 95% 100% 98%   Weight:       Height:         No intake or output data in the 24 hours ending 08/24/24 1030    Vitals:    08/22/24 2117   Weight: 100 kg (220 lb 14.4 oz)       CURRENT MEDICATIONS    apixaban, 5 mg, oral, q12h  aspirin, 81 mg, oral, Daily  [Held by provider]  "hydroCHLOROthiazide, 12.5 mg, oral, Daily  lisinopril, 20 mg, oral, Daily  metoprolol succinate XL, 50 mg, oral, BID  pravastatin, 20 mg, oral, Nightly         Current Outpatient Medications   Medication Instructions    aspirin 81 mg EC tablet 1 tablet, oral, Daily    desloratadine (CLARINEX) 5 mg, oral, Daily    hydroCHLOROthiazide (MICROZIDE) 12.5 mg, oral, Daily    metoprolol succinate XL (TOPROL-XL) 25 mg, oral, 2 times daily    pravastatin (PRAVACHOL) 20 mg, oral, Nightly        PHYSICAL EXAMINATION   GENERAL:  Well developed, well nourished, in no acute distress.  CHEST:  Symmetric and nontender.  NECK:  Supple, no JVD, no bruit.  LUNGS:  Clear to auscultation bilaterally, normal respiratory effort.  HEART: Irregularly irregular with controlled rate, normal S1 and S2 without S3.  No peripheral edema.  Telemetry shows an average heart rate of 70 bpm without pauses.  ABDOMEN: Soft, NT, ND without palpable organomegaly, normoactive bowel sounds.   EXTREMITIES:  Warm with good color, no clubbing or cyanosis.  There is no edema noted.  PERIPHERAL VASCULAR:  Pulses present and equally palpable; 2+ throughout.  NEURO/PSYCH:  Alert and oriented times three with approppriate behavior and responses.  INTEGUMENT: No rashes    LAB DATA     CBC:   Results from last 7 days   Lab Units 08/24/24  0435   WBC AUTO x10*3/uL 6.0   RBC AUTO x10*6/uL 4.18*   HEMOGLOBIN g/dL 14.2   HEMATOCRIT % 41.9   PLATELETS AUTO x10*3/uL 129*        CMP:    Results from last 7 days   Lab Units 08/24/24  0435   SODIUM mmol/L 135*   POTASSIUM mmol/L 3.8   CHLORIDE mmol/L 101   CO2 mmol/L 24   BUN mg/dL 16   CREATININE mg/dL 1.07   GLUCOSE mg/dL 96   CALCIUM mg/dL 8.9       Cardiac Enzymes:    Lab Results   Component Value Date    TROPHS 10 08/22/2024    TROPHS 10 08/22/2024    TROPHS 5 08/18/2023       Magnesium:    Lab Results   Component Value Date    MG 2.04 08/24/2024       Lipid Profile:  No results found for: \"CHLPL\", \"TRIG\", \"HDL\", " "\"LDLCALC\", \"LDLDIRECT\"    TSH:    Lab Results   Component Value Date    TSH 1.03 08/22/2024       BNP:   Lab Results   Component Value Date     (H) 08/22/2024        PT/INR:    Lab Results   Component Value Date    PROTIME 13.7 (H) 08/22/2024    INR 1.2 (H) 08/22/2024       HgBA1c:    Lab Results   Component Value Date    HGBA1C 5.4 08/23/2024       CBC:  Lab Results   Component Value Date    WBC 6.0 08/24/2024    WBC 4.8 08/23/2024    WBC 9.6 08/22/2024    RBC 4.18 (L) 08/24/2024    RBC 4.13 (L) 08/23/2024    RBC 4.30 (L) 08/22/2024    HGB 14.2 08/24/2024    HGB 14.0 08/23/2024    HGB 14.7 08/22/2024    HCT 41.9 08/24/2024    HCT 42.0 08/23/2024    HCT 43.8 08/22/2024     08/24/2024     (H) 08/23/2024     (H) 08/22/2024    MCH 34.0 08/24/2024    MCH 33.9 08/23/2024    MCH 34.2 (H) 08/22/2024    MCHC 33.9 08/24/2024    MCHC 33.3 08/23/2024    MCHC 33.6 08/22/2024    RDW 11.9 08/24/2024    RDW 12.0 08/23/2024    RDW 12.3 08/22/2024     (L) 08/24/2024     (L) 08/23/2024     08/22/2024       BMP:  Lab Results   Component Value Date     (L) 08/24/2024     (L) 08/23/2024     (L) 08/22/2024    K 3.8 08/24/2024    K 3.5 08/23/2024    K 3.8 08/22/2024     08/24/2024    CL 98 08/23/2024    CL 95 (L) 08/22/2024    CO2 24 08/24/2024    CO2 24 08/23/2024    CO2 28 08/22/2024    BUN 16 08/24/2024    BUN 15 08/23/2024    BUN 18 08/22/2024    CREATININE 1.07 08/24/2024    CREATININE 1.04 08/23/2024    CREATININE 1.36 (H) 08/22/2024       Hepatic Function Panel:    Lab Results   Component Value Date    ALKPHOS 47 08/22/2024    ALT 29 08/22/2024    AST 25 08/22/2024    PROT 6.9 08/22/2024    BILITOT 1.6 (H) 08/22/2024         DIAGNOSTIC TESTING RESULTS     Transthoracic Echo (TTE) Limited    Result Date: 8/23/2024            [facility] [place] [address], [facility] [address]    Tel [phone] Fax [phone] TRANSTHORACIC ECHOCARDIOGRAM REPORT " Patient Name:      NAE ESPINOZA         Javier Physician:    [id number] Kat Vincent MD Study Date:        8/23/2024             Ordering Provider:    [id number] JORDAN VARMA MRN/PID:           [id number]              Fellow: Accession#:        [id number]          Nurse: Date of Birth/Age: 1950 / 73 years  Sonographer:          Elizabeth Braga RDCS Gender:            M                     Additional Staff: Height:            180.34 cm             Admit Date:           8/22/2024 Weight:            99.79 kg              Admission Status:     Inpatient -                                                                Routine BSA / BMI:         2.20 m2 / 30.68 kg/m2 Department Location:  [facility] Blood Pressure: 142 /62 mmHg Study Type:    TRANSTHORACIC ECHO (TTE) LIMITED Diagnosis/ICD: Unspecified atrial flutter-I48.92 Indication:    A-flutter CPT Codes:     Echo Limited-92611; Doppler Limited-65016; Color Doppler-43606 Patient History: BMI:               Obese >30 Pertinent History: Dyspnea, A-Fib and CAD. Previous echocardiogram 09-. Study Detail: The following Echo studies were performed: 2D, Doppler and color               flow. Technically challenging study due to body habitus. Unable to               obtain suprasternal notch and subcostal view.  PHYSICIAN INTERPRETATION: Left Ventricle: Left ventricular ejection fraction is mildly decreased, by visual estimate at 45%. There is global hypokinesis of the left ventricle with minor regional variations. The left ventricular cavity size is normal. There is no evidence of left ventricular hypertrophy. There is borderline asymmetric left ventricular hypertrophy involving the septal wall. No dynamic left ventricular outflow tract obstruction observed. Spectral Doppler shows an impaired relaxation pattern of left ventricular diastolic  filling. There is no definite left ventricular thrombus visualized. Technically difficult and limited study. Contrast not utilized - would use on future studies. Left Atrium: The left atrium is normal in size. Right Ventricle: The right ventricle is mildly enlarged. There is low normal right ventricular systolic function. Right Atrium: The right atrium was not well visualized. Aortic Valve: The aortic valve was not assessed. Aortic valve regurgitation was not assessed. Mitral Valve: The mitral valve is mildly thickened. There is no evidence of mitral valve prolapse. There is no evidence of mitral valve stenosis. There is no evidence of mitral valve regurgitation. Tricuspid Valve: The tricuspid valve is structurally normal. Tricuspid regurgitation was not assessed. Pulmonic Valve: The pulmonic valve was not assessed. The pulmonic valve regurgitation was not assessed. Pericardium: There is no pericardial effusion noted. There is an anterior clear space. Aorta: The aortic root was not assessed. In comparison to the previous echocardiogram(s): Compared with study dated 9/28/2023,. LVEF has mildly declined - TDS imaging on todays study. Visually does not look as severe in some views.  CONCLUSIONS:  1. Left ventricular ejection fraction is mildly decreased, by visual estimate at 45%.  2. There is global hypokinesis of the left ventricle with minor regional variations.  3. Spectral Doppler shows an impaired relaxation pattern of left ventricular diastolic filling.  4. There is borderline asymmetric left ventricular hypertrophy.  5. No left ventricular thrombus visualized.  6. There is no evidence of left ventricular hypertrophy.  7. There is low normal right ventricular systolic function.  8. Mildly enlarged right ventricle.  9. No evidence of mitral valve prolapse. 10. LVEF has mildly declined - TDS imaging on todays study. Visually does not look as severe in some views. QUANTITATIVE DATA SUMMARY: 2D MEASUREMENTS:                           Normal Ranges: LAs:           3.77 cm   (2.7-4.0cm) IVSd:          1.17 cm   (0.6-1.1cm) LVPWd:         0.88 cm   (0.6-1.1cm) LVIDd:         4.99 cm   (3.9-5.9cm) LVIDs:         2.71 cm LV Mass Index: 85.5 g/m2 LV % FS        45.7 % LV SYSTOLIC FUNCTION BY 2D PLANIMETRY (MOD):                      Normal Ranges: EF-A4C View:    41 % (>=55%) EF-A2C View:    44 % EF-Biplane:     42 % EF-Visual:      45 % LV EF Reported: 45 % LV DIASTOLIC FUNCTION:                            Normal Ranges: MV Peak E:      0.70 m/s   (0.7-1.2 m/s) MV e'           0.095 m/s  (>8.0) MV lateral e'   0.10 m/s MV medial e'    0.09 m/s E/e' Ratio:     7.39       (<8.0) PulmV Sys Geoffrey:  21.73 cm/s PulmV Jacome Geoffrey: 46.29 cm/s PulmV S/D Geoffrey:  0.47 MITRAL VALVE:                 Normal Ranges: MV DT: 220 msec (150-240msec) Pulmonary Veins: PulmV Jacome Geoffrey: 46.29 cm/s PulmV S/D Geoffrey:  0.47 PulmV Sys Geoffrey:  21.73 cm/s  [id number] Kat Vincent MD Electronically signed on 8/23/2024 at 4:17:32 PM  ** Final **     ECG 12 lead    Result Date: 8/23/2024  Atrial flutter with variable AV block Nonspecific ST and T wave abnormality Abnormal ECG When compared with ECG of 18-AUG-2023 18:45, Atrial flutter has replaced Sinus rhythm Vent. rate has increased BY  72 BPM Nonspecific T wave abnormality now evident in Lateral leads Confirmed by Jase Lopez ([id number]) on 8/23/2024 2:18:45 PM    ECG 12 lead    Result Date: 8/23/2024  Atrial fibrillation Otherwise normal ECG When compared with ECG of 22-AUG-2024 17:32, (unconfirmed) Current undetermined rhythm precludes rhythm comparison, needs review Confirmed by Jase Lopez ([id number]) on 8/23/2024 2:18:34 PM    ECG 12 Lead    Result Date: 8/23/2024  Atrial flutter with variable AV block Nonspecific ST abnormality Abnormal ECG When compared with ECG of 22-AUG-2024 18:55, (unconfirmed) Previous ECG has undetermined rhythm, needs review Nonspecific T wave abnormality no longer evident in Inferior leads Confirmed  by Jase Lopez ([id number]) on 8/23/2024 2:18:19 PM    XR chest 1 view    Result Date: 8/22/2024  Interpreted By:  Jayy Mcdowell, STUDY: XR CHEST 1 VIEW;  8/22/2024 6:10 pm   INDICATION: Signs/Symptoms:Chest Pain.   COMPARISON: 08/18/2023   ACCESSION NUMBER(S): [id number]   ORDERING CLINICIAN: MEAGAN VARGAS   FINDINGS: No consolidation. No pleural effusion or pneumothorax. Normal heart size. No acute osseous abnormality. Stable 4 mm right mid lung zone and 5 mm left lower lung zone pulmonary nodules.       No acute cardiopulmonary abnormality.   Signed by: Jayy Mcdowell 8/22/2024 6:25 PM Dictation workstation:   [id number]    ECG 12 Lead    Result Date: 8/22/2024  Atrial flutter with varying AV block with ventricular rate 148 bpm         RADIOLOGY     Transthoracic Echo (TTE) Limited   Final Result      XR chest 1 view   Final Result   No acute cardiopulmonary abnormality.        Signed by: Jayy Mcdowell 8/22/2024 6:25 PM   Dictation workstation:   [id number]            ASSESSMENT     Problem List Items Addressed This Visit       Atrial flutter (Multi)    Relevant Medications    metoprolol succinate XL (Toprol-XL) 24 hr tablet 50 mg    metoprolol succinate XL (Toprol-XL) 24 hr tablet 25 mg (Completed)    digoxin (Lanoxin) injection 500 mcg (Completed)    digoxin (Lanoxin) injection 250 mcg (Completed)    * (Principal) Atrial flutter with rapid ventricular response (Multi) - Primary    Relevant Medications    metoprolol succinate XL (Toprol-XL) 24 hr tablet 50 mg    metoprolol succinate XL (Toprol-XL) 24 hr tablet 25 mg (Completed)    digoxin (Lanoxin) injection 500 mcg (Completed)    digoxin (Lanoxin) injection 250 mcg (Completed)    Other Relevant Orders    Transthoracic Echo (TTE) Limited (Completed)    Exertional dyspnea    Relevant Orders    Transthoracic Echo (TTE) Limited (Completed)       Patient Active Problem List   Diagnosis    Abnormal EKG    Abnormal urine cytology    Actinic keratosis     Other seborrheic keratosis    Agatston coronary artery calcium score less than 100    Alcohol abuse    Allergic rhinitis    Basal cell carcinoma of skin of other part of trunk    Benign essential hypertension    Benign keratosis of skin    Other melanin hyperpigmentation    Biceps muscle tear    Bowel dysfunction    DJD of right shoulder    Elevated PSA    Eosinophilia    Erectile dysfunction    Fatty liver    Hepatomegaly    Gingivitis    Hemangioma of skin and subcutaneous tissue    Hyperglycemia    Hyperlipidemia    Infection of mouth    Osteoarthritis of both knees    Lumbosacral radiculopathy at L1    Right knee DJD    Melanocytic nevi of trunk    Neoplasm of uncertain behavior of skin    Obesity (BMI 30-39.9)    PVC (premature ventricular contraction)    Shortness of breath    Ventricular arrhythmia    Melanocytic nevi of unspecified upper limb, including shoulder    Melanocytic nevi of lower limb, including hip    Bilateral leg edema    CAD (coronary artery disease)    Class 1 obesity with body mass index (BMI) of 33.0 to 33.9 in adult    Skin lesion    Aortic root aneurysm (CMS-HCC)    Never smoked cigarettes    Amblyopia suspect, left eye    Asymmetry of optic nerve of both eyes    Dermatochalasis of both upper eyelids    Early dry stage nonexudative age-related macular degeneration of both eyes    Enlarged aorta (CMS-HCC)    Pseudophakia    Regular astigmatism of left eye    Encounter for colorectal cancer screening    Atrial flutter (Multi)    Other fatigue    Atrial flutter with rapid ventricular response (Multi)    Exertional dyspnea       PLAN     1.  Atrial fibrillation atrial flutter now controlled and on oral anticoagulation.  Rhythm today on the monitor is atrial fibrillation.  He shown both on his EKGs.  Plan is for rate control and anticoagulation.  Would like to facilitate this.  Arranging transesophageal echocardiogram and cardioversion next week on an outpatient basis.  2.  Mild LV  dysfunction.  He has some mild cardiomyopathy that is global in nature.  I suspect that this is most likely tachycardia related but any additional evaluation can be performed at the discretion of his regular cardiologist.  He has been started on some afterload reduction and there is no clinical signs of any significant heart failure on examination today.  3.  History of aortic root aneurysm.  Patient was scheduled for an echocardiogram early next month given the echocardiogram performed here as well as the transesophageal echocardiogram as performed told him he can safely cancel that echocardiogram.    We talked to the patient about activities he should curtail while awaiting his cardioversion as well as safety related to oral anticoagulant therapy.  He is safe to be discharged home for further outpatient evaluation and treatment.    Please do not hesitate to call with questions.  Electronically signed by Kat Vincent MD, on 8/24/2024 at 10:30 AM

## 2024-08-24 NOTE — DISCHARGE SUMMARY
Discharge Diagnosis  Atrial flutter with rapid ventricular response (Multi)    Issues Requiring Follow-Up  Follow-up with PCP  Follow-up with cardiology  Follow-up with electrophysiology    Discharge Meds     Your medication list        START taking these medications        Instructions Last Dose Given Next Dose Due   apixaban 5 mg tablet  Commonly known as: Eliquis      Take 1 tablet (5 mg) by mouth every 12 hours.       lisinopril 20 mg tablet  Start taking on: August 25, 2024      Take 1 tablet (20 mg) by mouth once daily.              CHANGE how you take these medications        Instructions Last Dose Given Next Dose Due   metoprolol succinate XL 50 mg 24 hr tablet  Commonly known as: Toprol-XL  What changed:   how much to take  additional instructions      Take 1 tablet (50 mg) by mouth 2 times a day. Do not crush or chew.              CONTINUE taking these medications        Instructions Last Dose Given Next Dose Due   aspirin 81 mg EC tablet           desloratadine 5 mg tablet  Commonly known as: Clarinex      TAKE 1 TABLET BY MOUTH EVERY DAY       hydroCHLOROthiazide 12.5 mg capsule  Commonly known as: Microzide      TAKE 1 CAPSULE BY MOUTH ONCE DAILY.       pravastatin 20 mg tablet  Commonly known as: Pravachol      TAKE 1 TABLET (20 MG) BY MOUTH ONCE DAILY AT BEDTIME.                 Where to Get Your Medications        These medications were sent to [facility]/pharmacy #[id number] [address] AT [place] [address]      Phone: [phone]   apixaban 5 mg tablet  lisinopril 20 mg tablet  metoprolol succinate XL 50 mg 24 hr tablet         Test Results Pending At Discharge  Pending Labs       No current pending labs.            Hospital Course  Johann Casey is a 73 y.o. male  with PMHx of HTN, HLD, CAD w/o stents, aortic root aneurysm, hepatic steatosis presenting from his PCP's office due to new onset atrial flutter and tachycardia. Patient's chief complain for  visiting his PCP's office was 2-3 months of increasing fatigue and SOB  with exertion. He reported drinking 3-4 light beers a day, which he as recently cut down to 1-2 beers/day.  In ED he was tachycardic however his blood pressure stable 125/71, his EKG showed tachycardic atrial flutter with variable AV block.  His troponin was negative, BNP was 498 as well as he found to have MAY creatinine 1.36.  He was admitted for new onset atrial flutter which may have been triggered by heavy alcohol use or dehydration.  During his hospital stay the pulse rate was controlled by metoprolol and there is controlled with digoxin, anticoagulant given his Chads2 Vasc score  2   cardiology and EP was consulted who recommended echocardiogram which showed :Left ventricular ejection fraction is mildly decreased, by visual estimate at 45% and global hypokinesis of the left ventricle with minor regional variations.  After heart rate was controlled cardiology recommended LILIANA to exclude appendage thrombus before cardioversion, additionally he recommended long-term need for rhythm control as well as increase metoprolol dose.  Patient condition improved and his heart rate was controlled and he was discharged in a good condition    Pertinent Physical Exam At Time of Discharge  Physical Exam  Constitutional:       Appearance: Normal appearance.   HENT:      Head: Normocephalic and atraumatic.      Mouth/Throat:      Mouth: Mucous membranes are moist.   Cardiovascular:      Rate and Rhythm: Normal rate. Rhythm irregular.      Heart sounds: No murmur heard.     No gallop.   Pulmonary:      Effort: Pulmonary effort is normal. No respiratory distress.      Breath sounds: Normal breath sounds. No wheezing or rales.   Abdominal:      General: Abdomen is flat.      Tenderness: There is no abdominal tenderness.   Musculoskeletal:      Right lower leg: No edema.      Left lower leg: No edema.   Skin:     General: Skin is warm.   Neurological:       Mental Status: He is alert and oriented to person, place, and time.      Sensory: No sensory deficit.      Motor: No weakness.   Psychiatric:         Mood and Affect: Mood normal.         Behavior: Behavior normal.         Outpatient Follow-Up  Future Appointments   Date Time Provider Department Center   10/4/2024  8:15 AM [facility] [id number] ECHO/VASC 3 [id number] Felisha Ayala   10/11/2024  1:00 PM Fred Badillo MD [id number] [place]         Gray Shelby MD  Internal medicine, PGY1

## 2024-08-24 NOTE — CARE PLAN
Pt remained hemodynamically stable throughout the shift. No complaints of chest pain/pain. Remains aflutter on tele, HR controlled. Received metoprolol and eliquis as ordered. Safety maintained, will continue to monitor     Problem: Pain - Adult  Goal: Verbalizes/displays adequate comfort level or baseline comfort level  Outcome: Progressing     Problem: Safety - Adult  Goal: Free from fall injury  Outcome: Progressing     Problem: Discharge Planning  Goal: Discharge to home or other facility with appropriate resources  Outcome: Progressing     Problem: Chronic Conditions and Co-morbidities  Goal: Patient's chronic conditions and co-morbidity symptoms are monitored and maintained or improved  Outcome: Progressing     Problem: Atrial Fibrillation  Goal: I will have no complications due to atrial fibrillation  Outcome: Progressing

## 2024-08-24 NOTE — DISCHARGE INSTRUCTIONS
Your transesophageal echocardiogram (LILIANA) is scheduled for 10/4    Please follow-up with RONALD Ritchie, in 1 week as an outpatient.  You have an appointment with Dr. Badillo on 10/11    Please call your primary care physician's office and make an appointment for follow-up from this visit for sometime in the next 2-3 days.      Please take all your medications as directed.     NEW MEDICATIONS:  Apixaban 5 mg 1 tablet twice daily  Metoprolol XL 50 mg 1 tablet twice daily  Lisinopril 20 mg 1 tablet once daily        If you experience any further concerning symptoms, or return of symptoms, please make sure to seek immediate medical attention.    Thank you for trusting our team and allowing us to participate in your healthcare.  We hope you feel better soon.    [facility] Teaching Service.

## 2024-08-26 ENCOUNTER — PATIENT OUTREACH (OUTPATIENT)
Dept: PRIMARY CARE | Facility: CLINIC | Age: 74
End: 2024-08-26
Payer: MEDICARE

## 2024-08-26 NOTE — PROGRESS NOTES
Discharge Facility:  [facility] [place]  Discharge Diagnosis: A flutter with RVR  Admission Date:  8/22/24  Discharge Date:  8/24/24    PCP Appointment Date: Dr Yanez patient encouraged to make an appt  Specialist Appointment Date:  Dr Badillo 10/11/24--sending message for sooner appt  Hospital Encounter and Summary Linked: Yes  See discharge assessment below for further details     Engagement  Call Start Time: 1429 (8/26/2024  2:35 PM)    Medications  Medications reviewed with patient/caregiver?: Yes (8/26/2024  2:35 PM)  Is the patient having any side effects they believe may be caused by any medication additions or changes?: No (8/26/2024  2:35 PM)  Does the patient have all medications ordered at discharge?: Yes (8/26/2024  2:35 PM)  Prescription Comments: START taking:  apixaban (Eliquis)   lisinopril  Start taking on: August 25, 2024 (8/26/2024  2:35 PM)  Medication Comments: n/a (8/26/2024  2:35 PM)    Appointments  Does the patient have a primary care provider?: Yes (8/26/2024  2:35 PM)  Care Management Interventions: Advised patient to make appointment (8/26/2024  2:35 PM)  Care Management Interventions: Advised to reschedule appointment (sending message for sooner appt) (8/26/2024  2:35 PM)  Follow Up Tasks: Appointments (8/26/2024  2:35 PM)    Self Management  What is the home health agency?: n/a (8/26/2024  2:35 PM)  What Durable Medical Equipment (DME) was ordered?: n/a (8/26/2024  2:35 PM)    Patient Teaching  Does the patient have access to their discharge instructions?: Yes (8/26/2024  2:35 PM)  Care Management Interventions: Reviewed instructions with patient (8/26/2024  2:35 PM)  What is the patient's perception of their health status since discharge?: Improving (8/26/2024  2:35 PM)  Is the patient/caregiver able to teach back the hierarchy of who to call/visit for symptoms/problems? PCP, Specialist, Home Health nurse, Urgent Care, ED, 911: Yes (8/26/2024  2:35 PM)  Patient/Caregiver  Education Comments: Reviewed new medications (8/26/2024  2:35 PM)    Wrap Up  Wrap Up Additional Comments: Patient states he had minimal palpitation on the day of discharge but no further palpitations. Reviewed new medications patient has understanding of indications. Patient is keeping a log of BPs. Reviewed upcoming appts patient is aware that this CM will send a message to Dr Badillo for sooner appt. This CM encouraged patient to make follow up with PCP. This CM gives contact information for non urgent matters (8/26/2024  2:35 PM)

## 2024-08-28 ENCOUNTER — TELEPHONE (OUTPATIENT)
Dept: CARDIOLOGY | Facility: HOSPITAL | Age: 74
End: 2024-08-28

## 2024-08-29 ENCOUNTER — HOSPITAL ENCOUNTER (OUTPATIENT)
Dept: CARDIOLOGY | Facility: HOSPITAL | Age: 74
Discharge: HOME | End: 2024-08-29
Payer: MEDICARE

## 2024-08-29 ENCOUNTER — ANESTHESIA EVENT (OUTPATIENT)
Dept: CARDIOLOGY | Facility: HOSPITAL | Age: 74
End: 2024-08-29
Payer: MEDICARE

## 2024-08-29 ENCOUNTER — ANESTHESIA (OUTPATIENT)
Dept: CARDIOLOGY | Facility: HOSPITAL | Age: 74
End: 2024-08-29
Payer: MEDICARE

## 2024-08-29 VITALS
TEMPERATURE: 97.9 F | RESPIRATION RATE: 18 BRPM | BODY MASS INDEX: 30.81 KG/M2 | OXYGEN SATURATION: 97 % | HEART RATE: 57 BPM | SYSTOLIC BLOOD PRESSURE: 97 MMHG | DIASTOLIC BLOOD PRESSURE: 64 MMHG | HEIGHT: 71 IN

## 2024-08-29 DIAGNOSIS — I48.4 ATYPICAL ATRIAL FLUTTER (MULTI): ICD-10-CM

## 2024-08-29 DIAGNOSIS — I48.91 UNSPECIFIED ATRIAL FIBRILLATION (MULTI): ICD-10-CM

## 2024-08-29 DIAGNOSIS — I48.19 PERSISTENT ATRIAL FIBRILLATION (MULTI): ICD-10-CM

## 2024-08-29 LAB
ANION GAP SERPL CALC-SCNC: 12 MMOL/L (ref 10–20)
APTT PPP: 37 SECONDS (ref 27–38)
BUN SERPL-MCNC: 13 MG/DL (ref 6–23)
CALCIUM SERPL-MCNC: 9.8 MG/DL (ref 8.6–10.3)
CHLORIDE SERPL-SCNC: 99 MMOL/L (ref 98–107)
CO2 SERPL-SCNC: 29 MMOL/L (ref 21–32)
CREAT SERPL-MCNC: 1.24 MG/DL (ref 0.5–1.3)
EGFRCR SERPLBLD CKD-EPI 2021: 62 ML/MIN/1.73M*2
EJECTION FRACTION: 55 %
GLUCOSE SERPL-MCNC: 117 MG/DL (ref 74–99)
INR PPP: 1.6 (ref 0.9–1.1)
POTASSIUM SERPL-SCNC: 4.5 MMOL/L (ref 3.5–5.3)
PROTHROMBIN TIME: 18.4 SECONDS (ref 9.8–12.8)
SODIUM SERPL-SCNC: 135 MMOL/L (ref 136–145)

## 2024-08-29 PROCEDURE — 93005 ELECTROCARDIOGRAM TRACING: CPT

## 2024-08-29 PROCEDURE — 93320 DOPPLER ECHO COMPLETE: CPT | Performed by: INTERNAL MEDICINE

## 2024-08-29 PROCEDURE — 2500000005 HC RX 250 GENERAL PHARMACY W/O HCPCS: Performed by: INTERNAL MEDICINE

## 2024-08-29 PROCEDURE — 80048 BASIC METABOLIC PNL TOTAL CA: CPT | Performed by: NURSE PRACTITIONER

## 2024-08-29 PROCEDURE — 93320 DOPPLER ECHO COMPLETE: CPT

## 2024-08-29 PROCEDURE — 2500000004 HC RX 250 GENERAL PHARMACY W/ HCPCS (ALT 636 FOR OP/ED): Performed by: STUDENT IN AN ORGANIZED HEALTH CARE EDUCATION/TRAINING PROGRAM

## 2024-08-29 PROCEDURE — 36415 COLL VENOUS BLD VENIPUNCTURE: CPT | Performed by: NURSE PRACTITIONER

## 2024-08-29 PROCEDURE — 92960 CARDIOVERSION ELECTRIC EXT: CPT | Performed by: INTERNAL MEDICINE

## 2024-08-29 PROCEDURE — 2500000004 HC RX 250 GENERAL PHARMACY W/ HCPCS (ALT 636 FOR OP/ED): Performed by: NURSE PRACTITIONER

## 2024-08-29 PROCEDURE — 2500000004 HC RX 250 GENERAL PHARMACY W/ HCPCS (ALT 636 FOR OP/ED): Performed by: INTERNAL MEDICINE

## 2024-08-29 PROCEDURE — 85610 PROTHROMBIN TIME: CPT | Performed by: NURSE PRACTITIONER

## 2024-08-29 PROCEDURE — 3700000001 HC GENERAL ANESTHESIA TIME - INITIAL BASE CHARGE

## 2024-08-29 PROCEDURE — 93325 DOPPLER ECHO COLOR FLOW MAPG: CPT | Performed by: INTERNAL MEDICINE

## 2024-08-29 PROCEDURE — 3700000002 HC GENERAL ANESTHESIA TIME - EACH INCREMENTAL 1 MINUTE

## 2024-08-29 PROCEDURE — 93312 ECHO TRANSESOPHAGEAL: CPT | Performed by: INTERNAL MEDICINE

## 2024-08-29 RX ORDER — PROPOFOL 10 MG/ML
INJECTION, EMULSION INTRAVENOUS AS NEEDED
Status: DISCONTINUED | OUTPATIENT
Start: 2024-08-29 | End: 2024-08-29

## 2024-08-29 RX ORDER — MIDAZOLAM HYDROCHLORIDE 1 MG/ML
INJECTION INTRAMUSCULAR; INTRAVENOUS AS NEEDED
Status: DISCONTINUED | OUTPATIENT
Start: 2024-08-29 | End: 2024-08-29 | Stop reason: HOSPADM

## 2024-08-29 RX ORDER — FENTANYL CITRATE 50 UG/ML
INJECTION, SOLUTION INTRAMUSCULAR; INTRAVENOUS AS NEEDED
Status: DISCONTINUED | OUTPATIENT
Start: 2024-08-29 | End: 2024-08-29 | Stop reason: HOSPADM

## 2024-08-29 RX ORDER — SODIUM CHLORIDE 9 MG/ML
20 INJECTION, SOLUTION INTRAVENOUS CONTINUOUS
Status: DISCONTINUED | OUTPATIENT
Start: 2024-08-29 | End: 2024-08-29

## 2024-08-29 RX ORDER — LIDOCAINE HYDROCHLORIDE 20 MG/ML
JELLY TOPICAL AS NEEDED
Status: DISCONTINUED | OUTPATIENT
Start: 2024-08-29 | End: 2024-08-29 | Stop reason: HOSPADM

## 2024-08-29 SDOH — HEALTH STABILITY: MENTAL HEALTH: CURRENT SMOKER: 0

## 2024-08-29 ASSESSMENT — COLUMBIA-SUICIDE SEVERITY RATING SCALE - C-SSRS
2. HAVE YOU ACTUALLY HAD ANY THOUGHTS OF KILLING YOURSELF?: NO
1. IN THE PAST MONTH, HAVE YOU WISHED YOU WERE DEAD OR WISHED YOU COULD GO TO SLEEP AND NOT WAKE UP?: NO
6. HAVE YOU EVER DONE ANYTHING, STARTED TO DO ANYTHING, OR PREPARED TO DO ANYTHING TO END YOUR LIFE?: NO

## 2024-08-29 ASSESSMENT — PAIN - FUNCTIONAL ASSESSMENT: PAIN_FUNCTIONAL_ASSESSMENT: 0-10

## 2024-08-29 ASSESSMENT — PAIN SCALES - GENERAL: PAINLEVEL_OUTOF10: 0 - NO PAIN

## 2024-08-29 NOTE — ANESTHESIA PREPROCEDURE EVALUATION
Johann Casey is a 73 y.o. male here for:    Transesophageal Echo (LILIANA) With Possible Cardioversion  With * No providers found *  Atypical atrial flutter (Multi)  Persistent atrial fibrillation (Multi)    Relevant Problems   Cardiac   (+) Abnormal EKG   (+) Atrial flutter (Multi)   (+) Atrial flutter with rapid ventricular response (Multi)   (+) Benign essential hypertension   (+) CAD (coronary artery disease)   (+) HTN (hypertension)   (+) Hyperlipidemia   (+) PVC (premature ventricular contraction)   (+) Ventricular arrhythmia      Pulmonary   (+) Exertional dyspnea      Neuro   (+) Lumbosacral radiculopathy at L1      Liver   (+) Fatty liver   (+) Hepatomegaly      Endocrine   (+) Class 1 obesity with body mass index (BMI) of 33.0 to 33.9 in adult      Musculoskeletal   (+) DJD of right shoulder   (+) Osteoarthritis of both knees   (+) Right knee DJD      ID   (+) Infection of mouth      Skin   (+) Basal cell carcinoma of skin of other part of trunk       Lab Results   Component Value Date    HGB 14.2 08/24/2024    HCT 41.9 08/24/2024    WBC 6.0 08/24/2024     (L) 08/24/2024     (L) 08/29/2024    K 4.5 08/29/2024    CL 99 08/29/2024    CREATININE 1.24 08/29/2024    BUN 13 08/29/2024       Social History     Tobacco Use   Smoking Status Never   Smokeless Tobacco Never       Allergies   Allergen Reactions    Amoxicillin Rash    Losartan Rash       Current Outpatient Medications   Medication Instructions    apixaban (ELIQUIS) 5 mg, oral, Every 12 hours    desloratadine (CLARINEX) 5 mg, oral, Daily    hydroCHLOROthiazide (MICROZIDE) 12.5 mg, oral, Daily    lisinopril 20 mg, oral, Daily    metoprolol succinate XL (TOPROL-XL) 50 mg, oral, 2 times daily, Do not crush or chew.    pravastatin (PRAVACHOL) 20 mg, oral, Nightly       Past Surgical History:   Procedure Laterality Date    CATARACT EXTRACTION, BILATERAL Left 01/25/2024    CATARACT EXTRACTION, BILATERAL Right 01/18/2024    OTHER SURGICAL HISTORY   02/08/2022    Skin biopsy    OTHER SURGICAL HISTORY  02/08/2022    Tonsillectomy    OTHER SURGICAL HISTORY  03/01/2022    Vasectomy       Family History   Problem Relation Name Age of Onset    Hypertension Mother      Cancer Mother      Colon cancer Mother      Hypertension Father      Lung cancer Father      Cancer Father         NPO Details:  NPO/Void Status  Date of Last Liquid: 08/29/24  Time of Last Liquid: 0745  Date of Last Solid: 08/28/24  Time of Last Solid: 2000  Last Intake Type: Clear fluids  Time of Last Void: 0700        Physical Exam    Airway  Mallampati: III  TM distance: >3 FB     Cardiovascular    Dental    Pulmonary    Abdominal            Anesthesia Plan    History of general anesthesia?: yes  History of complications of general anesthesia?: no    ASA 3     MAC     The patient is not a current smoker.    intravenous induction   Anesthetic plan and risks discussed with patient.

## 2024-08-29 NOTE — POST-PROCEDURE NOTE
Physician Transition of Care Summary  Invasive Cardiovascular Lab    Procedure Date: 8/29/2024  Attending: * No surgeons found in log *  Resident/Fellow/Other Assistant: * No surgeons found in log *    Indications:   * No Diagnosis Codes entered *    Post-procedure diagnosis:   * No Diagnosis Codes entered *    Procedure(s):   * No procedures documented on diagnosis form *      Procedure Findings:   Atrial flutter, normal left ventricular function, mild dilatation of left atrium, no thrombus, no PFO, successful synchronized cardioversion with conversion to sinus rhythm using 200 J, 1 shock    Description of the Procedure:   Transesophageal echocardiogram with color Doppler and bubble saline, successful synchronized cardioversion    Complications:   None    Stents/Implants:   Implants       No implant documentation for this case.            Anticoagulation/Antiplatelet Plan:   Eliquis    Estimated Blood Loss:   0 mL    Anesthesia: * No anesthesia type entered * Anesthesia Staff: Anesthesiologist: Asim Mitchell MD    Any Specimen(s) Removed:   Order Name Source Comment Collection Info Order Time   BASIC METABOLIC PANEL Blood, Venous  Collected By: Kimberly Casaletta, RN 8/29/2024 10:50 AM     Release result to MyChart   Immediate        COAGULATION SCREEN Blood, Venous  Collected By: Kimberly Casaletta, RN 8/29/2024 10:50 AM     Release result to MyChart   Immediate            Disposition:   Follow-up as an outpatient      Electronically signed by: Fred Badillo MD, 8/29/2024 1:28 PM

## 2024-08-29 NOTE — SIGNIFICANT EVENT
Pt back from cath lab, sleepy but arousable, BP running low, 70's/50's, repositioned cuff, took BP on other arm, BP same, no complaints of pain, lightheadedness or dizziness, will notify NP and monitor closely

## 2024-08-29 NOTE — LETTER
August 29, 2024     Patient: Johann Casey   YOB: 1950   Date of Visit: 8/29/2024       To Whom It May Concern:    Patient may return to work on with no restrictions post heart procedure performed at [facility] on 8/29/2024 under the care of Dr. Badillo.

## 2024-08-29 NOTE — SIGNIFICANT EVENT
Discharge instructions reviewed with patient and wife including follow up and medications, verbalized understanding, requested return to work slip, notified NP

## 2024-08-29 NOTE — DOCUMENTATION CLARIFICATION NOTE
"    PATIENT:               NAE ESPINOZA  ACCT #:                  [id number]  MRN:                       [id number]  :                       1950  ADMIT DATE:       2024 5:27 PM  DISCH DATE:        2024 12:18 PM  RESPONDING PROVIDER #:        [id number]          PROVIDER RESPONSE TEXT:    Acute kidney injury with acute tubular necrosis    CDI QUERY TEXT:    Clarification        Instruction:    Based on your assessment of the patient and the clinical information, please provide the requested documentation by clicking on the appropriate radio button and enter any additional information if prompted.    Question: Please further clarify the diagnosis of acute kidney injury as    When answering this query, please exercise your independent professional judgment. The fact that a question is being asked, does not imply that any particular answer is desired or expected.    The patient's clinical indicators include:  Clinical Information: 73 yr old male presented to ED  with increasing fatigue and SOB. EKG- Aflutter with RVR. Treated with Cardizem bolus and infusion. Cardiology and EP consulted, Echo with HF and CM. Discharged  with anticoagulation, metoprolol and plan for LILIANA, possible cardioversion.    Clinical Indicators: H/P  per Dr. Marinelli states: \"pre-renal MAY w/ +/- ischemic ATN\"    H/P  per Dr. Soto states: \"MAY with BUN/SCR /1.36 (baseline 0.91). \"    Scr : 1.36  Scr : 1.04  Scr : 1.07        Treatment: 500 LR bolus, trend CMP, avoid nephrotoxic drugs.    Risk Factors: MAY, Aflutter with RVR, cardiomyopathy, HF.  Options provided:  -- Acute kidney injury with acute tubular necrosis  -- Acute kidney injury without acute tubular necrosis  -- Other - I will add my own diagnosis  -- Refer to Clinical Documentation Reviewer    Query created by: Karma Mcdonald on 2024 8:29 AM      Electronically signed by:  JORDAN SOTO MD 2024 11:56 AM          "

## 2024-08-29 NOTE — ANESTHESIA POSTPROCEDURE EVALUATION
Patient: Johann Casey    Procedure Summary       Date: 08/29/24 Room / Location: [facility]    Anesthesia Start: 1322 Anesthesia Stop: 1326    Procedure: TRANSESOPHAGEAL ECHO (LILIANA) W/ POSSIBLE CARDIOVERSION Diagnosis:       Atypical atrial flutter (Multi)      Persistent atrial fibrillation (Multi)      (atrial fibrillation / flutter)    Scheduled Providers: Fred Badillo MD Responsible Provider: Asim Mitchell MD    Anesthesia Type: MAC ASA Status: 3            Anesthesia Type: MAC    Vitals Value Taken Time   BP 98/68 08/29/24 1333   Temp  08/29/24 1333   Pulse 55 08/29/24 1333   Resp 9 08/29/24 1333   SpO2 96% 08/29/24 1333       Anesthesia Post Evaluation    Patient location during evaluation: PACU  Patient participation: complete - patient participated  Level of consciousness: awake and alert  Pain management: adequate  Multimodal analgesia pain management approach  Airway patency: patent  Cardiovascular status: acceptable  Respiratory status: acceptable  Hydration status: acceptable  Postoperative Nausea and Vomiting: none        There were no known notable events for this encounter.

## 2024-08-30 ENCOUNTER — APPOINTMENT (OUTPATIENT)
Dept: CARDIOLOGY | Facility: HOSPITAL | Age: 74
End: 2024-08-30
Payer: MEDICARE

## 2024-08-31 LAB
ATRIAL RATE: 304 BPM
ATRIAL RATE: 59 BPM
P AXIS: 117 DEGREES
P AXIS: 60 DEGREES
P OFFSET: 150 MS
P OFFSET: 179 MS
P ONSET: 112 MS
P ONSET: 116 MS
PR INTERVAL: 212 MS
Q ONSET: 218 MS
Q ONSET: 219 MS
QRS COUNT: 10 BEATS
QRS COUNT: 12 BEATS
QRS DURATION: 80 MS
QRS DURATION: 90 MS
QT INTERVAL: 376 MS
QT INTERVAL: 432 MS
QTC CALCULATION(BAZETT): 423 MS
QTC CALCULATION(BAZETT): 427 MS
QTC FREDERICIA: 406 MS
QTC FREDERICIA: 429 MS
R AXIS: 44 DEGREES
R AXIS: 52 DEGREES
T AXIS: 65 DEGREES
T AXIS: 66 DEGREES
T OFFSET: 407 MS
T OFFSET: 434 MS
VENTRICULAR RATE: 59 BPM
VENTRICULAR RATE: 76 BPM

## 2024-09-10 ENCOUNTER — PATIENT OUTREACH (OUTPATIENT)
Dept: CARDIOLOGY | Facility: CLINIC | Age: 74
End: 2024-09-10
Payer: MEDICARE

## 2024-09-10 NOTE — PROGRESS NOTES
Unable to reach patient for call back after patient's follow up appointment with PCP.   LISAM with call back number for patient to call if needed   If no voicemail available call attempts x 2 were made to contact the patient to assist with any questions or concerns patient may have.

## 2024-09-13 DIAGNOSIS — I48.3 TYPICAL ATRIAL FLUTTER (MULTI): ICD-10-CM

## 2024-09-13 DIAGNOSIS — I50.20 HFREF (HEART FAILURE WITH REDUCED EJECTION FRACTION) (MULTI): ICD-10-CM

## 2024-09-13 DIAGNOSIS — I10 PRIMARY HYPERTENSION: ICD-10-CM

## 2024-09-13 RX ORDER — LISINOPRIL 20 MG/1
20 TABLET ORAL DAILY
Qty: 90 TABLET | Refills: 3 | Status: SHIPPED | OUTPATIENT
Start: 2024-09-13 | End: 2025-09-13

## 2024-09-13 NOTE — TELEPHONE ENCOUNTER
Received request for prescription refill for patient.  Patient follows with Dr. Fred Badillo MD, [facility]     Request is for lisinopril and Eliquis   Is patient currently on medication- yes    Last OV- 10/6/23  Next OV- 10/11/24    Pended for signing and sent to provider.

## 2024-09-16 DIAGNOSIS — I50.20 HFREF (HEART FAILURE WITH REDUCED EJECTION FRACTION): Primary | ICD-10-CM

## 2024-09-19 PROBLEM — I48.92 ATRIAL FLUTTER (MULTI): Status: RESOLVED | Noted: 2024-08-22 | Resolved: 2024-09-19

## 2024-09-19 PROBLEM — E66.9 OBESITY (BMI 30-39.9): Status: RESOLVED | Noted: 2023-10-05 | Resolved: 2024-09-19

## 2024-10-10 ENCOUNTER — PATIENT OUTREACH (OUTPATIENT)
Dept: CARDIOLOGY | Facility: CLINIC | Age: 74
End: 2024-10-10

## 2024-10-10 ENCOUNTER — APPOINTMENT (OUTPATIENT)
Dept: CARDIOLOGY | Facility: CLINIC | Age: 74
End: 2024-10-10
Payer: MEDICARE

## 2024-10-10 VITALS
OXYGEN SATURATION: 97 % | HEART RATE: 63 BPM | SYSTOLIC BLOOD PRESSURE: 118 MMHG | DIASTOLIC BLOOD PRESSURE: 70 MMHG | WEIGHT: 225 LBS | BODY MASS INDEX: 31.5 KG/M2 | HEIGHT: 71 IN

## 2024-10-10 DIAGNOSIS — I10 PRIMARY HYPERTENSION: ICD-10-CM

## 2024-10-10 DIAGNOSIS — I48.92 ATRIAL FLUTTER WITH RAPID VENTRICULAR RESPONSE (MULTI): Primary | ICD-10-CM

## 2024-10-10 DIAGNOSIS — I50.20 HFREF (HEART FAILURE WITH REDUCED EJECTION FRACTION): ICD-10-CM

## 2024-10-10 DIAGNOSIS — I48.0 PAROXYSMAL ATRIAL FIBRILLATION (MULTI): ICD-10-CM

## 2024-10-10 PROCEDURE — 1159F MED LIST DOCD IN RCRD: CPT | Performed by: STUDENT IN AN ORGANIZED HEALTH CARE EDUCATION/TRAINING PROGRAM

## 2024-10-10 PROCEDURE — 3008F BODY MASS INDEX DOCD: CPT | Performed by: STUDENT IN AN ORGANIZED HEALTH CARE EDUCATION/TRAINING PROGRAM

## 2024-10-10 PROCEDURE — 93000 ELECTROCARDIOGRAM COMPLETE: CPT | Performed by: STUDENT IN AN ORGANIZED HEALTH CARE EDUCATION/TRAINING PROGRAM

## 2024-10-10 PROCEDURE — 3078F DIAST BP <80 MM HG: CPT | Performed by: STUDENT IN AN ORGANIZED HEALTH CARE EDUCATION/TRAINING PROGRAM

## 2024-10-10 PROCEDURE — 3074F SYST BP LT 130 MM HG: CPT | Performed by: STUDENT IN AN ORGANIZED HEALTH CARE EDUCATION/TRAINING PROGRAM

## 2024-10-10 PROCEDURE — 1036F TOBACCO NON-USER: CPT | Performed by: STUDENT IN AN ORGANIZED HEALTH CARE EDUCATION/TRAINING PROGRAM

## 2024-10-10 PROCEDURE — 99214 OFFICE O/P EST MOD 30 MIN: CPT | Performed by: STUDENT IN AN ORGANIZED HEALTH CARE EDUCATION/TRAINING PROGRAM

## 2024-10-10 RX ORDER — METOPROLOL SUCCINATE 50 MG/1
50 TABLET, EXTENDED RELEASE ORAL DAILY
Qty: 30 TABLET | Refills: 11 | Status: SHIPPED | OUTPATIENT
Start: 2024-10-10 | End: 2025-10-10

## 2024-10-10 NOTE — PROGRESS NOTES
"    Cardiac Electrophysiology Office Visit     Referred by Dr. Zuluaga ref. provider found for   Chief Complaint   Patient presents with    Atrial Fibrillation    Establish Care     HPI:  Johnan Casey is a 74 y.o. year old male patient with h/o hypertension, HLD, newly diagnosed atrial fibrillation/flutter presenting today to establish care    Objective  Current Outpatient Medications   Medication Instructions    apixaban (ELIQUIS) 5 mg, oral, Every 12 hours    desloratadine (CLARINEX) 5 mg, oral, Daily    hydroCHLOROthiazide (MICROZIDE) 12.5 mg, oral, Daily    lisinopril 20 mg, oral, Daily    metoprolol succinate XL (TOPROL-XL) 50 mg, oral, 2 times daily, Do not crush or chew.    pravastatin (PRAVACHOL) 20 mg, oral, Nightly      Visit Vitals  /70 (BP Location: Right arm, Patient Position: Sitting)   Pulse 63   Ht 1.803 m (5' 11\")   Wt 102 kg (225 lb)   SpO2 97%   BMI 31.38 kg/m²   Smoking Status Never   BSA 2.26 m²      Physical Exam  Vitals reviewed.   Constitutional:       Appearance: Normal appearance.   HENT:      Head: Normocephalic.   Cardiovascular:      Rate and Rhythm: Normal rate and regular rhythm.   Pulmonary:      Effort: Pulmonary effort is normal. No respiratory distress.      Breath sounds: No wheezing.   Skin:     General: Skin is warm and dry.      Capillary Refill: Capillary refill takes less than 2 seconds.   Neurological:      Mental Status: He is alert.   Psychiatric:         Mood and Affect: Mood normal.           My Interpretation of Reviewed Study(s):  Echo (August 2024): Mildly reduced LV function EF of 45% with global hypokinesis.  Normal LA.  No pericardial effusion.  [id number] Score  Age 65-74: 1   Sex Male: 0   CHF History Yes: 1   HTN Yes: 1   Stroke/TIA/Thromboembolism No: 0   Vascular Dz: CAD/PAD/Aortic Plaque No: 0   DM No: 0   Total Score 3     Assessment/Plan   #Persistent atrial fibrillation  #Atrial flutter  AF Dx History: Patient was recently admitted to the hospital in " August for A-fib with RVR.; h/o Cardioversion: Yes; AAD Use: None; Anticoagulation use: Apixaban 5mg BID (current); h/o Ablation: None; [id number] Score: 3  We discussed out options for rhythm control which given his decreased EF and tachycardia induced cardiomyopathy is ideal management strategy. AAD options: Class IC Flecainide/Propafenone: May be limited due to sinus bradycardia/AV Block, Class III Dofetilide: Reasonable option, and Amiodarone: Not ideal as long term strategy due to patients young age;  Ablation: Reasonable option due to patient's young age and heart failure with reduced EF in the setting of tach induced cardiomyopathy.  Patient has been feeling remarkably well since cardioversion has been maintaining sinus rhythm and also has been successfully abstaining from any alcohol use since his discharge from the hospital.  We discussed again long-term rhythm control and I think at this point patient does need some rhythm control whether would be ablative strategy or antiarrhythmic therapy.  Patient would like to discuss the options with his wife and then get back to me about proceeding with either option.  c/w AC: Apixaban 5mg BID  Decrease metoprolol succinate to 50 mg daily (to avoid significant bradycardia)  Patient will discuss with the family member about antiarrhythmic therapy versus ablative strategy    #Pre-procedure Planning -if patient calls back and wants to proceed with ablative strategy  Procedure Type: pAF/tAFL RFA - EnSite  Planned Date:  TBD  Anesthesia Needed: General Anesthesia  Medication and Labwork Instructions:  Anticoagulation: Apixaban 5mg BID, Does it need to be held: Hold Morning of procedure  Antiplatelet agents: None  SGLT2 Inhibitors: No  GLP1 Weekly shots: No  Other medication changes needed for procedure: Hold Morning of procedure    Contrast allergy? No    Imaging needed? No  Blood work ordered:  Will need CBC, BMP, type and screen prior to procedure      #  HTN  Controlled  Hydrochlorothiazide 12.5 mg daily  Lisinopril 20 mg daily    Return to Clinic: Patient should return to the EP Clinic in 3 months    Jase Lopez MD [facility]  Cardiac Electrophysiology  [email or web address]    **Disclaimer: This note was dictated by speech recognition, and every effort has been made to prevent any error in transcription, however minor errors may be present**

## 2024-10-10 NOTE — PATIENT INSTRUCTIONS
We are decreasing your Metoprolol to 50mg Once  day to avoid excessive slow heart rates now that you are in Sinus rhythm.  We talked about medications to keep in you in normal rhythm or ablation procedure to try keep you in normal rhythm long term.

## 2024-10-11 ENCOUNTER — APPOINTMENT (OUTPATIENT)
Dept: CARDIOLOGY | Facility: CLINIC | Age: 74
End: 2024-10-11
Payer: MEDICARE

## 2024-10-11 VITALS
HEIGHT: 69 IN | BODY MASS INDEX: 33.07 KG/M2 | WEIGHT: 223.3 LBS | DIASTOLIC BLOOD PRESSURE: 82 MMHG | HEART RATE: 72 BPM | SYSTOLIC BLOOD PRESSURE: 134 MMHG

## 2024-10-11 DIAGNOSIS — I49.3 PVC (PREMATURE VENTRICULAR CONTRACTION): ICD-10-CM

## 2024-10-11 DIAGNOSIS — I48.0 PAROXYSMAL ATRIAL FIBRILLATION (MULTI): ICD-10-CM

## 2024-10-11 DIAGNOSIS — I10 BENIGN ESSENTIAL HYPERTENSION: ICD-10-CM

## 2024-10-11 DIAGNOSIS — E78.2 MIXED HYPERLIPIDEMIA: ICD-10-CM

## 2024-10-11 DIAGNOSIS — Z78.9 NEVER SMOKED CIGARETTES: ICD-10-CM

## 2024-10-11 DIAGNOSIS — I25.10 CORONARY ARTERY DISEASE INVOLVING NATIVE CORONARY ARTERY OF NATIVE HEART WITHOUT ANGINA PECTORIS: ICD-10-CM

## 2024-10-11 DIAGNOSIS — R93.1 AGATSTON CORONARY ARTERY CALCIUM SCORE LESS THAN 100: ICD-10-CM

## 2024-10-11 PROBLEM — E66.811 CLASS 1 OBESITY WITH BODY MASS INDEX (BMI) OF 30.0 TO 30.9 IN ADULT: Status: RESOLVED | Noted: 2023-10-05 | Resolved: 2024-10-11

## 2024-10-11 PROCEDURE — 99214 OFFICE O/P EST MOD 30 MIN: CPT | Performed by: INTERNAL MEDICINE

## 2024-10-11 PROCEDURE — 1036F TOBACCO NON-USER: CPT | Performed by: INTERNAL MEDICINE

## 2024-10-11 PROCEDURE — 3075F SYST BP GE 130 - 139MM HG: CPT | Performed by: INTERNAL MEDICINE

## 2024-10-11 PROCEDURE — 3079F DIAST BP 80-89 MM HG: CPT | Performed by: INTERNAL MEDICINE

## 2024-10-11 PROCEDURE — 1159F MED LIST DOCD IN RCRD: CPT | Performed by: INTERNAL MEDICINE

## 2024-10-11 PROCEDURE — 3008F BODY MASS INDEX DOCD: CPT | Performed by: INTERNAL MEDICINE

## 2024-10-11 NOTE — PROGRESS NOTES
Referred by Dr. Zuluaga ref. provider found provider found for   Chief Complaint   Patient presents with    Follow-up     1 year follow up, pt had cardioversion 8/29/2024        History of Present Illness  Johann Casey is a 74 y.o. year old male patient status post recent cardioversion for atrial flutter.  He converted to sinus rhythm.  He.  He continued to be in sinus rhythm by EKG yesterday done at the EP office.  There was discussion about ablation versus medical management.  The patient probably willing to go for ablation.  He will follow-up with his EP physician.  He will call me for any problem and follow-up with me in 6 months    Past Medical History  Past Medical History:   Diagnosis Date    Arrhythmia     Cough 01/02/2018    Dermatitis medicamentosa 08/23/2024    Disorder of the skin and subcutaneous tissue, unspecified     Skin lesion    Generalized skin eruption due to drugs and medicaments taken internally     Drug rash    Headache 01/02/2018    Hyperlipidemia     Hypertension     Other specified abnormal findings of blood chemistry 02/25/2022    Abnormal LFTs    Other urticaria     Urticaria, acute    Peripheral edema 08/23/2024    Strain of muscle, fascia and tendon of other parts of biceps, unspecified arm, initial encounter 02/25/2022    Biceps muscle tear       Social History  Social History     Tobacco Use    Smoking status: Never    Smokeless tobacco: Never   Vaping Use    Vaping status: Never Used   Substance Use Topics    Alcohol use: Not Currently     Comment: quit 8/29/2024    Drug use: Never       Family History     Family History   Problem Relation Name Age of Onset    Hypertension Mother      Cancer Mother      Colon cancer Mother      Hypertension Father      Lung cancer Father      Cancer Father         Review of Systems  As per HPI, all other systems reviewed and negative.    Allergies:  Allergies   Allergen Reactions    Amoxicillin Rash    Losartan Rash        Outpatient  Medications:  Current Outpatient Medications   Medication Instructions    apixaban (ELIQUIS) 5 mg, oral, Every 12 hours    desloratadine (CLARINEX) 5 mg, oral, Daily    hydroCHLOROthiazide (MICROZIDE) 12.5 mg, oral, Daily    lisinopril 20 mg, oral, Daily    metoprolol succinate XL (TOPROL-XL) 50 mg, oral, Daily, Do not crush or chew.    pravastatin (PRAVACHOL) 20 mg, oral, Nightly         Vitals:  Vitals:    10/11/24 1259   BP: 134/82   Pulse: 72       Physical Exam:  Physical Exam  Constitutional:       Appearance: Normal appearance.   HENT:      Head: Normocephalic and atraumatic.   Eyes:      Extraocular Movements: Extraocular movements intact.      Pupils: Pupils are equal, round, and reactive to light.   Cardiovascular:      Rate and Rhythm: Normal rate and regular rhythm.      Pulses: Normal pulses.      Heart sounds: Normal heart sounds.   Pulmonary:      Effort: Pulmonary effort is normal.      Breath sounds: Normal breath sounds.   Abdominal:      General: Abdomen is flat.      Palpations: Abdomen is soft.   Musculoskeletal:      Right lower leg: No edema.      Left lower leg: No edema.   Skin:     General: Skin is warm and dry.   Neurological:      General: No focal deficit present.      Mental Status: He is alert and oriented to person, place, and time.             Assessment/Plan   Diagnoses and all orders for this visit:  Coronary artery disease involving native coronary artery of native heart without angina pectoris  Mixed hyperlipidemia  Agatston coronary artery calcium score less than 100  Benign essential hypertension  BMI 32.0-32.9,adult  Never smoked cigarettes  Paroxysmal atrial fibrillation (Multi)  PVC (premature ventricular contraction)          Fred Badillo MD [facility]  Interventional Cardiology   of [facility]     Thank you for allowing me to participate in the care of this patient. Please do not hesitate to contact me with any further questions or concerns.

## 2024-10-11 NOTE — PATIENT INSTRUCTIONS
Patient to follow up in 6 months with Dr. Fred Badillo MD [facility]     No other changes today.   Continue same medications and treatments.   Patient educated on proper medication use.   Patient educated on risk factor modification.   Please bring any lab results from other providers / physicians to your next appointment.     Please bring all medicines, vitamins, and herbal supplements with you when you come to the office.     Prescriptions will not be filled unless you are compliant with your follow up appointments or have a follow up appointment scheduled as per instruction of your physician. Refills should be requested at the time of your visit.    Davis CONTRERAS RN am scribing for and in the presence of Dr. Fred Badillo MD [facility]

## 2024-11-10 DIAGNOSIS — E78.2 MIXED HYPERLIPIDEMIA: ICD-10-CM

## 2024-11-10 DIAGNOSIS — I10 ESSENTIAL (PRIMARY) HYPERTENSION: ICD-10-CM

## 2024-11-12 ENCOUNTER — PATIENT OUTREACH (OUTPATIENT)
Dept: CARDIOLOGY | Facility: CLINIC | Age: 74
End: 2024-11-12
Payer: MEDICARE

## 2024-11-13 RX ORDER — HYDROCHLOROTHIAZIDE 12.5 MG/1
12.5 CAPSULE ORAL DAILY
Qty: 90 CAPSULE | Refills: 0 | Status: SHIPPED | OUTPATIENT
Start: 2024-11-13

## 2024-11-13 RX ORDER — PRAVASTATIN SODIUM 20 MG/1
20 TABLET ORAL NIGHTLY
Qty: 90 TABLET | Refills: 0 | Status: SHIPPED | OUTPATIENT
Start: 2024-11-13

## 2024-12-24 ENCOUNTER — APPOINTMENT (OUTPATIENT)
Dept: RADIOLOGY | Facility: HOSPITAL | Age: 74
End: 2024-12-24
Payer: MEDICARE

## 2024-12-24 ENCOUNTER — APPOINTMENT (OUTPATIENT)
Dept: CARDIOLOGY | Facility: HOSPITAL | Age: 74
End: 2024-12-24
Payer: MEDICARE

## 2024-12-24 ENCOUNTER — HOSPITAL ENCOUNTER (OUTPATIENT)
Facility: HOSPITAL | Age: 74
Setting detail: OBSERVATION
Discharge: HOME | End: 2024-12-25
Attending: STUDENT IN AN ORGANIZED HEALTH CARE EDUCATION/TRAINING PROGRAM | Admitting: NURSE PRACTITIONER
Payer: MEDICARE

## 2024-12-24 DIAGNOSIS — J11.1 FLU: Primary | ICD-10-CM

## 2024-12-24 DIAGNOSIS — N17.9 AKI (ACUTE KIDNEY INJURY) (CMS-HCC): ICD-10-CM

## 2024-12-24 DIAGNOSIS — J18.9 PNEUMONIA OF LEFT LUNG DUE TO INFECTIOUS ORGANISM, UNSPECIFIED PART OF LUNG: ICD-10-CM

## 2024-12-24 LAB
ALBUMIN SERPL BCP-MCNC: 3.8 G/DL (ref 3.4–5)
ALP SERPL-CCNC: 49 U/L (ref 33–136)
ALT SERPL W P-5'-P-CCNC: 20 U/L (ref 10–52)
ANION GAP SERPL CALC-SCNC: 11 MMOL/L (ref 10–20)
ANION GAP SERPL CALC-SCNC: 15 MMOL/L (ref 10–20)
AST SERPL W P-5'-P-CCNC: 21 U/L (ref 9–39)
BASOPHILS # BLD AUTO: 0.01 X10*3/UL (ref 0–0.1)
BASOPHILS NFR BLD AUTO: 0.1 %
BILIRUB SERPL-MCNC: 0.9 MG/DL (ref 0–1.2)
BUN SERPL-MCNC: 39 MG/DL (ref 6–23)
BUN SERPL-MCNC: 39 MG/DL (ref 6–23)
CALCIUM SERPL-MCNC: 7.6 MG/DL (ref 8.6–10.3)
CALCIUM SERPL-MCNC: 8.8 MG/DL (ref 8.6–10.3)
CHLORIDE SERPL-SCNC: 101 MMOL/L (ref 98–107)
CHLORIDE SERPL-SCNC: 98 MMOL/L (ref 98–107)
CO2 SERPL-SCNC: 20 MMOL/L (ref 21–32)
CO2 SERPL-SCNC: 22 MMOL/L (ref 21–32)
CREAT SERPL-MCNC: 2.03 MG/DL (ref 0.5–1.3)
CREAT SERPL-MCNC: 2.4 MG/DL (ref 0.5–1.3)
EGFRCR SERPLBLD CKD-EPI 2021: 28 ML/MIN/1.73M*2
EGFRCR SERPLBLD CKD-EPI 2021: 34 ML/MIN/1.73M*2
EOSINOPHIL # BLD AUTO: 0.01 X10*3/UL (ref 0–0.4)
EOSINOPHIL NFR BLD AUTO: 0.1 %
ERYTHROCYTE [DISTWIDTH] IN BLOOD BY AUTOMATED COUNT: 12 % (ref 11.5–14.5)
FLUAV RNA RESP QL NAA+PROBE: DETECTED
FLUBV RNA RESP QL NAA+PROBE: NOT DETECTED
GLUCOSE SERPL-MCNC: 144 MG/DL (ref 74–99)
GLUCOSE SERPL-MCNC: 185 MG/DL (ref 74–99)
HCT VFR BLD AUTO: 41.2 % (ref 41–52)
HGB BLD-MCNC: 14.7 G/DL (ref 13.5–17.5)
HOLD SPECIMEN: NORMAL
HOLD SPECIMEN: NORMAL
IMM GRANULOCYTES # BLD AUTO: 0.04 X10*3/UL (ref 0–0.5)
IMM GRANULOCYTES NFR BLD AUTO: 0.3 % (ref 0–0.9)
LACTATE SERPL-SCNC: 2 MMOL/L (ref 0.4–2)
LYMPHOCYTES # BLD AUTO: 0.34 X10*3/UL (ref 0.8–3)
LYMPHOCYTES NFR BLD AUTO: 2.9 %
MAGNESIUM SERPL-MCNC: 2.02 MG/DL (ref 1.6–2.4)
MCH RBC QN AUTO: 34.7 PG (ref 26–34)
MCHC RBC AUTO-ENTMCNC: 35.7 G/DL (ref 32–36)
MCV RBC AUTO: 97 FL (ref 80–100)
MONOCYTES # BLD AUTO: 0.46 X10*3/UL (ref 0.05–0.8)
MONOCYTES NFR BLD AUTO: 4 %
NEUTROPHILS # BLD AUTO: 10.67 X10*3/UL (ref 1.6–5.5)
NEUTROPHILS NFR BLD AUTO: 92.6 %
NRBC BLD-RTO: 0 /100 WBCS (ref 0–0)
PHOSPHATE SERPL-MCNC: 3.4 MG/DL (ref 2.5–4.9)
PLATELET # BLD AUTO: 142 X10*3/UL (ref 150–450)
POTASSIUM SERPL-SCNC: 3.8 MMOL/L (ref 3.5–5.3)
POTASSIUM SERPL-SCNC: 4 MMOL/L (ref 3.5–5.3)
PROT SERPL-MCNC: 6.8 G/DL (ref 6.4–8.2)
RBC # BLD AUTO: 4.24 X10*6/UL (ref 4.5–5.9)
RSV RNA RESP QL NAA+PROBE: NOT DETECTED
SARS-COV-2 RNA RESP QL NAA+PROBE: NOT DETECTED
SODIUM SERPL-SCNC: 129 MMOL/L (ref 136–145)
SODIUM SERPL-SCNC: 130 MMOL/L (ref 136–145)
WBC # BLD AUTO: 11.5 X10*3/UL (ref 4.4–11.3)

## 2024-12-24 PROCEDURE — 2500000001 HC RX 250 WO HCPCS SELF ADMINISTERED DRUGS (ALT 637 FOR MEDICARE OP): Performed by: NURSE PRACTITIONER

## 2024-12-24 PROCEDURE — 36415 COLL VENOUS BLD VENIPUNCTURE: CPT | Performed by: STUDENT IN AN ORGANIZED HEALTH CARE EDUCATION/TRAINING PROGRAM

## 2024-12-24 PROCEDURE — 87637 SARSCOV2&INF A&B&RSV AMP PRB: CPT | Performed by: STUDENT IN AN ORGANIZED HEALTH CARE EDUCATION/TRAINING PROGRAM

## 2024-12-24 PROCEDURE — 84145 PROCALCITONIN (PCT): CPT | Mod: ELYLAB | Performed by: STUDENT IN AN ORGANIZED HEALTH CARE EDUCATION/TRAINING PROGRAM

## 2024-12-24 PROCEDURE — 96365 THER/PROPH/DIAG IV INF INIT: CPT

## 2024-12-24 PROCEDURE — 83605 ASSAY OF LACTIC ACID: CPT | Performed by: STUDENT IN AN ORGANIZED HEALTH CARE EDUCATION/TRAINING PROGRAM

## 2024-12-24 PROCEDURE — 87040 BLOOD CULTURE FOR BACTERIA: CPT | Mod: ELYLAB | Performed by: STUDENT IN AN ORGANIZED HEALTH CARE EDUCATION/TRAINING PROGRAM

## 2024-12-24 PROCEDURE — 71046 X-RAY EXAM CHEST 2 VIEWS: CPT | Mod: FOREIGN READ | Performed by: RADIOLOGY

## 2024-12-24 PROCEDURE — 71046 X-RAY EXAM CHEST 2 VIEWS: CPT

## 2024-12-24 PROCEDURE — 2500000004 HC RX 250 GENERAL PHARMACY W/ HCPCS (ALT 636 FOR OP/ED): Performed by: STUDENT IN AN ORGANIZED HEALTH CARE EDUCATION/TRAINING PROGRAM

## 2024-12-24 PROCEDURE — 2500000004 HC RX 250 GENERAL PHARMACY W/ HCPCS (ALT 636 FOR OP/ED): Performed by: NURSE PRACTITIONER

## 2024-12-24 PROCEDURE — 93005 ELECTROCARDIOGRAM TRACING: CPT

## 2024-12-24 PROCEDURE — G0378 HOSPITAL OBSERVATION PER HR: HCPCS

## 2024-12-24 PROCEDURE — 99285 EMERGENCY DEPT VISIT HI MDM: CPT | Mod: 25 | Performed by: STUDENT IN AN ORGANIZED HEALTH CARE EDUCATION/TRAINING PROGRAM

## 2024-12-24 PROCEDURE — 85025 COMPLETE CBC W/AUTO DIFF WBC: CPT | Performed by: STUDENT IN AN ORGANIZED HEALTH CARE EDUCATION/TRAINING PROGRAM

## 2024-12-24 PROCEDURE — 84100 ASSAY OF PHOSPHORUS: CPT | Performed by: STUDENT IN AN ORGANIZED HEALTH CARE EDUCATION/TRAINING PROGRAM

## 2024-12-24 PROCEDURE — 83735 ASSAY OF MAGNESIUM: CPT | Performed by: STUDENT IN AN ORGANIZED HEALTH CARE EDUCATION/TRAINING PROGRAM

## 2024-12-24 PROCEDURE — 99222 1ST HOSP IP/OBS MODERATE 55: CPT | Performed by: NURSE PRACTITIONER

## 2024-12-24 PROCEDURE — 80053 COMPREHEN METABOLIC PANEL: CPT | Performed by: STUDENT IN AN ORGANIZED HEALTH CARE EDUCATION/TRAINING PROGRAM

## 2024-12-24 PROCEDURE — 82374 ASSAY BLOOD CARBON DIOXIDE: CPT | Performed by: STUDENT IN AN ORGANIZED HEALTH CARE EDUCATION/TRAINING PROGRAM

## 2024-12-24 PROCEDURE — 96361 HYDRATE IV INFUSION ADD-ON: CPT

## 2024-12-24 RX ORDER — ACETAMINOPHEN 325 MG/1
650 TABLET ORAL EVERY 4 HOURS PRN
Status: DISCONTINUED | OUTPATIENT
Start: 2024-12-24 | End: 2024-12-25 | Stop reason: HOSPADM

## 2024-12-24 RX ORDER — ACETAMINOPHEN 160 MG/5ML
650 SOLUTION ORAL EVERY 4 HOURS PRN
Status: DISCONTINUED | OUTPATIENT
Start: 2024-12-24 | End: 2024-12-25 | Stop reason: HOSPADM

## 2024-12-24 RX ORDER — ACETAMINOPHEN 500 MG
10 TABLET ORAL NIGHTLY PRN
Status: DISCONTINUED | OUTPATIENT
Start: 2024-12-24 | End: 2024-12-25 | Stop reason: HOSPADM

## 2024-12-24 RX ORDER — SODIUM CHLORIDE 9 MG/ML
100 INJECTION, SOLUTION INTRAVENOUS CONTINUOUS
Status: ACTIVE | OUTPATIENT
Start: 2024-12-24 | End: 2024-12-25

## 2024-12-24 RX ORDER — ACETAMINOPHEN 650 MG/1
650 SUPPOSITORY RECTAL EVERY 4 HOURS PRN
Status: DISCONTINUED | OUTPATIENT
Start: 2024-12-24 | End: 2024-12-25 | Stop reason: HOSPADM

## 2024-12-24 RX ORDER — POLYETHYLENE GLYCOL 3350 17 G/17G
17 POWDER, FOR SOLUTION ORAL DAILY PRN
Status: DISCONTINUED | OUTPATIENT
Start: 2024-12-24 | End: 2024-12-25 | Stop reason: HOSPADM

## 2024-12-24 RX ADMIN — APIXABAN 2.5 MG: 5 TABLET, FILM COATED ORAL at 20:19

## 2024-12-24 RX ADMIN — DOXYCYCLINE 100 MG: 100 INJECTION, POWDER, LYOPHILIZED, FOR SOLUTION INTRAVENOUS at 16:13

## 2024-12-24 RX ADMIN — SODIUM CHLORIDE, POTASSIUM CHLORIDE, SODIUM LACTATE AND CALCIUM CHLORIDE 1000 ML: 600; 310; 30; 20 INJECTION, SOLUTION INTRAVENOUS at 17:41

## 2024-12-24 RX ADMIN — SODIUM CHLORIDE, POTASSIUM CHLORIDE, SODIUM LACTATE AND CALCIUM CHLORIDE 2000 ML: 600; 310; 30; 20 INJECTION, SOLUTION INTRAVENOUS at 16:06

## 2024-12-24 RX ADMIN — SODIUM CHLORIDE 100 ML/HR: 9 INJECTION, SOLUTION INTRAVENOUS at 20:19

## 2024-12-24 SDOH — ECONOMIC STABILITY: INCOME INSECURITY: IN THE PAST 12 MONTHS HAS THE ELECTRIC, GAS, OIL, OR WATER COMPANY THREATENED TO SHUT OFF SERVICES IN YOUR HOME?: NO

## 2024-12-24 SDOH — SOCIAL STABILITY: SOCIAL INSECURITY: WERE YOU ABLE TO COMPLETE ALL THE BEHAVIORAL HEALTH SCREENINGS?: YES

## 2024-12-24 SDOH — SOCIAL STABILITY: SOCIAL INSECURITY: DO YOU FEEL ANYONE HAS EXPLOITED OR TAKEN ADVANTAGE OF YOU FINANCIALLY OR OF YOUR PERSONAL PROPERTY?: NO

## 2024-12-24 SDOH — ECONOMIC STABILITY: FOOD INSECURITY: HOW HARD IS IT FOR YOU TO PAY FOR THE VERY BASICS LIKE FOOD, HOUSING, MEDICAL CARE, AND HEATING?: NOT HARD AT ALL

## 2024-12-24 SDOH — SOCIAL STABILITY: SOCIAL INSECURITY: DO YOU FEEL UNSAFE GOING BACK TO THE PLACE WHERE YOU ARE LIVING?: NO

## 2024-12-24 SDOH — SOCIAL STABILITY: SOCIAL INSECURITY: DOES ANYONE TRY TO KEEP YOU FROM HAVING/CONTACTING OTHER FRIENDS OR DOING THINGS OUTSIDE YOUR HOME?: NO

## 2024-12-24 SDOH — SOCIAL STABILITY: SOCIAL INSECURITY: ABUSE: ADULT

## 2024-12-24 SDOH — SOCIAL STABILITY: SOCIAL INSECURITY: ARE THERE ANY APPARENT SIGNS OF INJURIES/BEHAVIORS THAT COULD BE RELATED TO ABUSE/NEGLECT?: NO

## 2024-12-24 SDOH — SOCIAL STABILITY: SOCIAL INSECURITY: HAVE YOU HAD THOUGHTS OF HARMING ANYONE ELSE?: NO

## 2024-12-24 SDOH — SOCIAL STABILITY: SOCIAL INSECURITY: HAS ANYONE EVER THREATENED TO HURT YOUR FAMILY OR YOUR PETS?: NO

## 2024-12-24 SDOH — ECONOMIC STABILITY: FOOD INSECURITY: WITHIN THE PAST 12 MONTHS, THE FOOD YOU BOUGHT JUST DIDN'T LAST AND YOU DIDN'T HAVE MONEY TO GET MORE.: NEVER TRUE

## 2024-12-24 SDOH — SOCIAL STABILITY: SOCIAL INSECURITY: WITHIN THE LAST YEAR, HAVE YOU BEEN AFRAID OF YOUR PARTNER OR EX-PARTNER?: NO

## 2024-12-24 SDOH — SOCIAL STABILITY: SOCIAL INSECURITY: HAVE YOU HAD ANY THOUGHTS OF HARMING ANYONE ELSE?: NO

## 2024-12-24 SDOH — SOCIAL STABILITY: SOCIAL INSECURITY
WITHIN THE LAST YEAR, HAVE YOU BEEN KICKED, HIT, SLAPPED, OR OTHERWISE PHYSICALLY HURT BY YOUR PARTNER OR EX-PARTNER?: NO

## 2024-12-24 SDOH — SOCIAL STABILITY: SOCIAL INSECURITY
WITHIN THE LAST YEAR, HAVE YOU BEEN RAPED OR FORCED TO HAVE ANY KIND OF SEXUAL ACTIVITY BY YOUR PARTNER OR EX-PARTNER?: NO

## 2024-12-24 SDOH — SOCIAL STABILITY: SOCIAL INSECURITY: WITHIN THE LAST YEAR, HAVE YOU BEEN HUMILIATED OR EMOTIONALLY ABUSED IN OTHER WAYS BY YOUR PARTNER OR EX-PARTNER?: NO

## 2024-12-24 SDOH — ECONOMIC STABILITY: FOOD INSECURITY: WITHIN THE PAST 12 MONTHS, YOU WORRIED THAT YOUR FOOD WOULD RUN OUT BEFORE YOU GOT THE MONEY TO BUY MORE.: NEVER TRUE

## 2024-12-24 SDOH — SOCIAL STABILITY: SOCIAL INSECURITY: ARE YOU OR HAVE YOU BEEN THREATENED OR ABUSED PHYSICALLY, EMOTIONALLY, OR SEXUALLY BY ANYONE?: NO

## 2024-12-24 ASSESSMENT — ENCOUNTER SYMPTOMS
HEMATURIA: 0
DIARRHEA: 0
RHINORRHEA: 1
COUGH: 1
FEVER: 1
ABDOMINAL PAIN: 0
CHILLS: 0
NAUSEA: 0
PALPITATIONS: 0
DYSURIA: 0
CONSTIPATION: 0
SHORTNESS OF BREATH: 0
VOMITING: 0
FREQUENCY: 0
FLANK PAIN: 0

## 2024-12-24 ASSESSMENT — COLUMBIA-SUICIDE SEVERITY RATING SCALE - C-SSRS
1. IN THE PAST MONTH, HAVE YOU WISHED YOU WERE DEAD OR WISHED YOU COULD GO TO SLEEP AND NOT WAKE UP?: NO
2. HAVE YOU ACTUALLY HAD ANY THOUGHTS OF KILLING YOURSELF?: NO
6. HAVE YOU EVER DONE ANYTHING, STARTED TO DO ANYTHING, OR PREPARED TO DO ANYTHING TO END YOUR LIFE?: NO

## 2024-12-24 ASSESSMENT — ACTIVITIES OF DAILY LIVING (ADL)
BATHING: INDEPENDENT
DRESSING YOURSELF: INDEPENDENT
WALKS IN HOME: INDEPENDENT
JUDGMENT_ADEQUATE_SAFELY_COMPLETE_DAILY_ACTIVITIES: YES
ADEQUATE_TO_COMPLETE_ADL: YES
TOILETING: INDEPENDENT
HEARING - LEFT EAR: FUNCTIONAL
HEARING - RIGHT EAR: FUNCTIONAL
LACK_OF_TRANSPORTATION: NO
ASSISTIVE_DEVICE: EYEGLASSES
PATIENT'S MEMORY ADEQUATE TO SAFELY COMPLETE DAILY ACTIVITIES?: YES
FEEDING YOURSELF: INDEPENDENT
GROOMING: INDEPENDENT

## 2024-12-24 ASSESSMENT — LIFESTYLE VARIABLES
SKIP TO QUESTIONS 9-10: 1
AUDIT-C TOTAL SCORE: 0
EVER FELT BAD OR GUILTY ABOUT YOUR DRINKING: NO
HOW MANY STANDARD DRINKS CONTAINING ALCOHOL DO YOU HAVE ON A TYPICAL DAY: PATIENT DOES NOT DRINK
AUDIT-C TOTAL SCORE: 0
HOW OFTEN DO YOU HAVE 6 OR MORE DRINKS ON ONE OCCASION: NEVER
HAVE PEOPLE ANNOYED YOU BY CRITICIZING YOUR DRINKING: NO
EVER HAD A DRINK FIRST THING IN THE MORNING TO STEADY YOUR NERVES TO GET RID OF A HANGOVER: NO
HOW OFTEN DO YOU HAVE A DRINK CONTAINING ALCOHOL: NEVER
TOTAL SCORE: 0
HAVE YOU EVER FELT YOU SHOULD CUT DOWN ON YOUR DRINKING: NO

## 2024-12-24 ASSESSMENT — COGNITIVE AND FUNCTIONAL STATUS - GENERAL
MOBILITY SCORE: 24
PATIENT BASELINE BEDBOUND: NO
DAILY ACTIVITIY SCORE: 24

## 2024-12-24 ASSESSMENT — PATIENT HEALTH QUESTIONNAIRE - PHQ9
1. LITTLE INTEREST OR PLEASURE IN DOING THINGS: NOT AT ALL
SUM OF ALL RESPONSES TO PHQ9 QUESTIONS 1 & 2: 0
2. FEELING DOWN, DEPRESSED OR HOPELESS: NOT AT ALL

## 2024-12-24 ASSESSMENT — PAIN - FUNCTIONAL ASSESSMENT
PAIN_FUNCTIONAL_ASSESSMENT: 0-10
PAIN_FUNCTIONAL_ASSESSMENT: 0-10

## 2024-12-24 ASSESSMENT — PAIN SCALES - GENERAL: PAINLEVEL_OUTOF10: 0 - NO PAIN

## 2024-12-24 NOTE — DISCHARGE INSTRUCTIONS
You were seen at [facility] ER for coughing and fevers and flu like symptoms. You had an exam concerning for possible Pneumonia but were found to have Influenza and no pneumonia on chest xray. Your kidney function showed some dehydration and you were given IV fluids and improved.     You are to continue to take tylenol at home for fever control and maintain your hydration.     You should follow-up with your primary care doctor in the next few days if your symptoms do not resolve.     You should return to the ER for continued fevers for more than 3 more days, increased shortness of breath or difficulty breathing, inability to tolerate liquids to maintain your hydration, or any other concerns.

## 2024-12-24 NOTE — ED PROVIDER NOTES
HPI   No chief complaint on file.      Pt is a 73 y/o M w/ a PMHx of HTN, HLD, CAD w/o stents, aortic root aneurysm, hepatic steatosis who p/w coughing and cold like symptoms for one week. It started about 5-7 days ago with rhinorrhea, nasal congestion and coughing but no fevers and has now progressed the last 2-3 days with fevers reduced appetitive and feeling week and sick. No sputum production. O abdominal pain, no nausea or vomiting, or dysuria, no headaches or neck pain and now new rashes. +Sick contact with wife. Has been drinking and eating very little as well.             Patient History   Past Medical History:   Diagnosis Date    Arrhythmia     Cough 01/02/2018    Dermatitis medicamentosa 08/23/2024    Disorder of the skin and subcutaneous tissue, unspecified     Skin lesion    Generalized skin eruption due to drugs and medicaments taken internally     Drug rash    Headache 01/02/2018    Hyperlipidemia     Hypertension     Other specified abnormal findings of blood chemistry 02/25/2022    Abnormal LFTs    Other urticaria     Urticaria, acute    Peripheral edema 08/23/2024    Strain of muscle, fascia and tendon of other parts of biceps, unspecified arm, initial encounter 02/25/2022    Biceps muscle tear     Past Surgical History:   Procedure Laterality Date    CARDIOVERSION  08/29/2024    CATARACT EXTRACTION, BILATERAL Left 01/25/2024    CATARACT EXTRACTION, BILATERAL Right 01/18/2024    OTHER SURGICAL HISTORY  02/08/2022    Skin biopsy    OTHER SURGICAL HISTORY  02/08/2022    Tonsillectomy    OTHER SURGICAL HISTORY  03/01/2022    Vasectomy     Family History   Problem Relation Name Age of Onset    Hypertension Mother      Cancer Mother      Colon cancer Mother      Hypertension Father      Lung cancer Father      Cancer Father       Social History     Tobacco Use    Smoking status: Never    Smokeless tobacco: Never   Vaping Use    Vaping status: Never Used   Substance Use Topics    Alcohol use: Not  Currently     Comment: quit 8/29/2024    Drug use: Never       Physical Exam   ED Triage Vitals   Temp Pulse Resp BP   -- -- -- --      SpO2 Temp src Heart Rate Source Patient Position   -- -- -- --      BP Location FiO2 (%)     -- --       Physical Exam  Constitutional:       General: He is not in acute distress.     Appearance: He is not ill-appearing.   HENT:      Mouth/Throat:      Mouth: Mucous membranes are dry.   Eyes:      General: No scleral icterus.     Extraocular Movements: Extraocular movements intact.      Pupils: Pupils are equal, round, and reactive to light.   Cardiovascular:      Rate and Rhythm: Normal rate and regular rhythm.      Heart sounds: No murmur heard.  Pulmonary:      Effort: Pulmonary effort is normal. No respiratory distress.      Breath sounds: Rales present. No wheezing.      Comments: +Rales in right lower lobe  Abdominal:      General: Abdomen is flat. Bowel sounds are normal.      Palpations: Abdomen is soft.      Tenderness: There is no abdominal tenderness.   Musculoskeletal:      Right lower leg: No edema.      Left lower leg: No edema.   Skin:     General: Skin is warm and dry.      Capillary Refill: Capillary refill takes less than 2 seconds.   Neurological:      General: No focal deficit present.      Mental Status: He is alert and oriented to person, place, and time.      Cranial Nerves: No cranial nerve deficit.      Motor: No weakness.   Psychiatric:         Mood and Affect: Mood normal.         Behavior: Behavior normal.         ED Course & MDM   ED Course as of 12/25/24 0704   Tue Dec 24, 2024   1837 BP much improved with IV fluids and Repeat BMP pending if patient with improved renal function will discharge home with instructions for follow-up and no need for Abx at this time.  [MATT]   1902 Pt BP still improved and Cr slightly improved but not as much as expected. If possible super improved bacterial PNA just not seen on CXR patient with CRUB65 of 4 and high risk.  Patient also has not urinated after 3L of IVFs. Discussed with patient and will admit to obs over night and patient agrees. [MATT]      ED Course User Index  [MATT] Gustavo Lake MD         Diagnoses as of 12/25/24 0704   Flu   Pneumonia of left lung due to infectious organism, unspecified part of lung                 No data recorded                                 Medical Decision Making  Pt is a 75 y/o M w/ a PMHx of HTN, HLD, CAD w/o stents, aortic root aneurysm, hepatic steatosis who p/w coughing and cold like symptoms for one week concerning for possible PNA vs viral illness with hypotension that could be dehydration related vs sepsis at this time.   Will get:  - CBC, CMP, mag phos, Urine leg, urine strep, Flu/COVID/RSV, blood cultures x 2, lactate  - CXR  - Give 2L IV fluids  - Doxycycline for possible CAP    EKG Examined and Independently Interpreted by me:  Normal Sinus Rhythm rate of 90 bpm  No STEs or TWIs that are new  No new ST Depressions  No Wellen's, Brugada, or AV blocks          Procedure  Procedures     Gustavo Lake MD  12/25/24 0704

## 2024-12-25 VITALS
BODY MASS INDEX: 30.19 KG/M2 | WEIGHT: 215.61 LBS | OXYGEN SATURATION: 96 % | DIASTOLIC BLOOD PRESSURE: 62 MMHG | RESPIRATION RATE: 17 BRPM | HEART RATE: 78 BPM | SYSTOLIC BLOOD PRESSURE: 105 MMHG | TEMPERATURE: 98.4 F | HEIGHT: 71 IN

## 2024-12-25 LAB
ANION GAP SERPL CALC-SCNC: 11 MMOL/L (ref 10–20)
BUN SERPL-MCNC: 31 MG/DL (ref 6–23)
CALCIUM SERPL-MCNC: 8 MG/DL (ref 8.6–10.3)
CHLORIDE SERPL-SCNC: 104 MMOL/L (ref 98–107)
CO2 SERPL-SCNC: 21 MMOL/L (ref 21–32)
CREAT SERPL-MCNC: 1.49 MG/DL (ref 0.5–1.3)
EGFRCR SERPLBLD CKD-EPI 2021: 49 ML/MIN/1.73M*2
ERYTHROCYTE [DISTWIDTH] IN BLOOD BY AUTOMATED COUNT: 11.8 % (ref 11.5–14.5)
GLUCOSE SERPL-MCNC: 112 MG/DL (ref 74–99)
HCT VFR BLD AUTO: 32.7 % (ref 41–52)
HGB BLD-MCNC: 11.9 G/DL (ref 13.5–17.5)
HOLD SPECIMEN: NORMAL
LEGIONELLA AG UR QL: NEGATIVE
MCH RBC QN AUTO: 35.1 PG (ref 26–34)
MCHC RBC AUTO-ENTMCNC: 36.4 G/DL (ref 32–36)
MCV RBC AUTO: 97 FL (ref 80–100)
NRBC BLD-RTO: 0 /100 WBCS (ref 0–0)
PLATELET # BLD AUTO: 92 X10*3/UL (ref 150–450)
POTASSIUM SERPL-SCNC: 3.5 MMOL/L (ref 3.5–5.3)
PROCALCITONIN SERPL-MCNC: 0.59 NG/ML
RBC # BLD AUTO: 3.39 X10*6/UL (ref 4.5–5.9)
S PNEUM AG UR QL: POSITIVE
SODIUM SERPL-SCNC: 132 MMOL/L (ref 136–145)
WBC # BLD AUTO: 9 X10*3/UL (ref 4.4–11.3)

## 2024-12-25 PROCEDURE — 85027 COMPLETE CBC AUTOMATED: CPT | Performed by: NURSE PRACTITIONER

## 2024-12-25 PROCEDURE — 99239 HOSP IP/OBS DSCHRG MGMT >30: CPT | Performed by: HOSPITALIST

## 2024-12-25 PROCEDURE — 87899 AGENT NOS ASSAY W/OPTIC: CPT | Mod: ELYLAB | Performed by: STUDENT IN AN ORGANIZED HEALTH CARE EDUCATION/TRAINING PROGRAM

## 2024-12-25 PROCEDURE — 80048 BASIC METABOLIC PNL TOTAL CA: CPT | Performed by: NURSE PRACTITIONER

## 2024-12-25 PROCEDURE — G0378 HOSPITAL OBSERVATION PER HR: HCPCS

## 2024-12-25 PROCEDURE — 87449 NOS EACH ORGANISM AG IA: CPT | Mod: ELYLAB | Performed by: STUDENT IN AN ORGANIZED HEALTH CARE EDUCATION/TRAINING PROGRAM

## 2024-12-25 PROCEDURE — 36415 COLL VENOUS BLD VENIPUNCTURE: CPT | Performed by: NURSE PRACTITIONER

## 2024-12-25 RX ORDER — ALBUTEROL SULFATE 90 UG/1
2 INHALANT RESPIRATORY (INHALATION) EVERY 4 HOURS PRN
Qty: 8 G | Refills: 11 | Status: SHIPPED | OUTPATIENT
Start: 2024-12-25

## 2024-12-25 RX ORDER — HYDROCODONE BITARTRATE AND HOMATROPINE METHYLBROMIDE ORAL SOLUTION 5; 1.5 MG/5ML; MG/5ML
5 LIQUID ORAL EVERY 6 HOURS PRN
Qty: 100 ML | Refills: 0 | Status: SHIPPED | OUTPATIENT
Start: 2024-12-25 | End: 2024-12-30

## 2024-12-25 ASSESSMENT — COGNITIVE AND FUNCTIONAL STATUS - GENERAL
DAILY ACTIVITIY SCORE: 24
MOBILITY SCORE: 24

## 2024-12-25 ASSESSMENT — PAIN SCALES - GENERAL: PAINLEVEL_OUTOF10: 0 - NO PAIN

## 2024-12-25 ASSESSMENT — PAIN - FUNCTIONAL ASSESSMENT: PAIN_FUNCTIONAL_ASSESSMENT: 0-10

## 2024-12-25 NOTE — H&P
History Of Present Illness  Johann Casey is a 74 y.o. male with a past medical history of of HTN, HLD, CAD w/o stents, aortic root aneurysm, and hepatic steatosis who presented to the ED with flu-like symptoms that started 5-7 days ago. He also reports decreased oral intake over the past few days. On exam in ED17, he reports feeling improved since arrival. He denies any current shortness of breath, chest pain, palpitations, nausea, or vomiting.      Past Medical History  Past Medical History:   Diagnosis Date    Arrhythmia     Cough 01/02/2018    Dermatitis medicamentosa 08/23/2024    Disorder of the skin and subcutaneous tissue, unspecified     Skin lesion    Generalized skin eruption due to drugs and medicaments taken internally     Drug rash    Headache 01/02/2018    Hyperlipidemia     Hypertension     Other specified abnormal findings of blood chemistry 02/25/2022    Abnormal LFTs    Other urticaria     Urticaria, acute    Peripheral edema 08/23/2024    Strain of muscle, fascia and tendon of other parts of biceps, unspecified arm, initial encounter 02/25/2022    Biceps muscle tear       Surgical History  Past Surgical History:   Procedure Laterality Date    CARDIOVERSION  08/29/2024    CATARACT EXTRACTION, BILATERAL Left 01/25/2024    CATARACT EXTRACTION, BILATERAL Right 01/18/2024    OTHER SURGICAL HISTORY  02/08/2022    Skin biopsy    OTHER SURGICAL HISTORY  02/08/2022    Tonsillectomy    OTHER SURGICAL HISTORY  03/01/2022    Vasectomy        Social History  He reports that he has never smoked. He has never used smokeless tobacco. He reports that he does not currently use alcohol. He reports that he does not use drugs.    Family History  Family History   Problem Relation Name Age of Onset    Hypertension Mother      Cancer Mother      Colon cancer Mother      Hypertension Father      Lung cancer Father      Cancer Father          Allergies  Amoxicillin and Losartan    Review of Systems   Constitutional:   "Positive for fever. Negative for chills.   HENT:  Positive for congestion and rhinorrhea.    Respiratory:  Positive for cough. Negative for shortness of breath.    Cardiovascular:  Negative for chest pain and palpitations.   Gastrointestinal:  Negative for abdominal pain, constipation, diarrhea, nausea and vomiting.   Genitourinary:  Negative for dysuria, flank pain, frequency, hematuria and urgency.   All other systems reviewed and are negative.       Physical Exam  Vitals reviewed.   Constitutional:       General: He is not in acute distress.     Appearance: He is not ill-appearing or toxic-appearing.   HENT:      Head: Normocephalic and atraumatic.   Cardiovascular:      Rate and Rhythm: Normal rate and regular rhythm.      Heart sounds: Normal heart sounds.   Pulmonary:      Effort: Pulmonary effort is normal.      Breath sounds: Normal air entry. Rales (RLL) present.   Abdominal:      General: Bowel sounds are normal.      Palpations: Abdomen is soft.      Tenderness: There is no abdominal tenderness.   Musculoskeletal:         General: No deformity.   Skin:     General: Skin is warm and dry.   Neurological:      General: No focal deficit present.      Mental Status: He is alert and oriented to person, place, and time.   Psychiatric:         Mood and Affect: Mood normal.         Behavior: Behavior normal.          Last Recorded Vitals  Blood pressure 102/61, pulse 70, temperature 36.5 °C (97.7 °F), resp. rate 20, height 1.803 m (5' 11\"), SpO2 97%.    Relevant Results      Lab Results   Component Value Date    WBC 11.5 (H) 12/24/2024    HGB 14.7 12/24/2024    HCT 41.2 12/24/2024    MCV 97 12/24/2024     (L) 12/24/2024     Lab Results   Component Value Date    GLUCOSE 144 (H) 12/24/2024    CALCIUM 7.6 (L) 12/24/2024     (L) 12/24/2024    K 4.0 12/24/2024    CO2 22 12/24/2024     12/24/2024    BUN 39 (H) 12/24/2024    CREATININE 2.03 (H) 12/24/2024     XR chest 2 views  Narrative: STUDY:  Chest " Radiographs;  12/24/2024, 5:01 PM.  INDICATION:  Cough. Rule out pneumonia.  COMPARISON:  CXR: 08/22/24, 08/18/23.  ACCESSION NUMBER(S):  [id number]  ORDERING CLINICIAN:  CORY TAYLOR  TECHNIQUE:  Frontal and lateral chest.   FINDINGS:  CARDIOMEDIASTINAL SILHOUETTE:  Cardiomediastinal silhouette is normal in size and configuration.     LUNGS:  There is mild prominence of the pulmonary interstitium and subtle  chronic changes.  Calcified granuloma in the right midlung zone  appears stable.    ABDOMEN:  No remarkable upper abdominal findings.     BONES:  No acute osseous changes.  Impression: No acute cardiopulmonary disease.  Signed by Johann Adams DO    ED Medication Administration from 12/24/2024 1528 to 12/24/2024 1935         Date/Time Order Dose Route Action Action by     12/24/2024 1606 EST lactated Ringer's bolus 2,000 mL 2,000 mL intravenous New Bag Heider, A     12/24/2024 1613 EST doxycycline (Vibramycin) 100 mg in dextrose 5%  mL 100 mg intravenous New Bag Heider, A     12/24/2024 1736 EST doxycycline (Vibramycin) 100 mg in dextrose 5%  mL 0 mg intravenous Stopped Nat, M     12/24/2024 1740 EST lactated Ringer's bolus 2,000 mL 0 mL intravenous Stopped Nat, M     12/24/2024 1741 EST lactated Ringer's bolus 1,030 mL 1,000 mL intravenous New Bag Nat,      12/24/2024 1853 EST lactated Ringer's bolus 1,030 mL 0 mL intravenous Stopped BENNETT Coon               Assessment/Plan   Assessment & Plan  MAY (acute kidney injury) (CMS-[facility])      #MAY  #Hypotension (improved)  -Likely prerenal  -Given 3L LR in ED with improvement in sCr, BP  -Will give 1L NS @ 100 mL/hr for 1L  -Avoid nephrotoxins  -Renal dosing  -Follow RFP    #Influenza  -No hypoxia, afebrile  -CXR -ve for acute process  -Symptoms started > 72 hours ago  -Mild leukocytosis, doubt superimposed bacterial PNA  -Will hold off on resuming doxy for now               Yuriy Torres, APRN-CNP

## 2024-12-25 NOTE — DISCHARGE SUMMARY
Discharge Diagnosis  MAY (acute kidney injury) (CMS-[facility]), influenza A infection     Discharge Meds     Your medication list        START taking these medications        Instructions Last Dose Given Next Dose Due   albuterol 90 mcg/actuation inhaler  Commonly known as: Ventolin HFA      Inhale 2 puffs every 4 hours if needed for wheezing or shortness of breath.       hydrocodone-homatropine 5-1.5 mg/5 mL syrup  Commonly known as: Hycodan      Take 5 mL by mouth every 6 hours if needed for cough for up to 5 days.              CONTINUE taking these medications        Instructions Last Dose Given Next Dose Due   apixaban 5 mg tablet  Commonly known as: Eliquis      Take 1 tablet (5 mg) by mouth every 12 hours.       desloratadine 5 mg tablet  Commonly known as: Clarinex      TAKE 1 TABLET BY MOUTH EVERY DAY       hydroCHLOROthiazide 12.5 mg capsule  Commonly known as: Microzide      TAKE 1 CAPSULE BY MOUTH ONCE DAILY.       lisinopril 20 mg tablet      Take 1 tablet (20 mg) by mouth once daily.       metoprolol succinate XL 50 mg 24 hr tablet  Commonly known as: Toprol-XL      Take 1 tablet (50 mg) by mouth once daily. Do not crush or chew.       pravastatin 20 mg tablet  Commonly known as: Pravachol      TAKE 1 TABLET (20 MG) BY MOUTH ONCE DAILY AT BEDTIME.                 Where to Get Your Medications        These medications were sent to [id number] [address] AT [facility] & [address] [address]      Hours: 24-hours Phone: [phone]   albuterol 90 mcg/actuation inhaler  hydrocodone-homatropine 5-1.5 mg/5 mL syrup         Test Results Pending At Discharge  Pending Labs       Order Current Status    Legionella Antigen, Urine In process    Procalcitonin In process    Streptococcus pneumoniae Antigen, Urine In process    Blood Culture Preliminary result    Blood Culture Preliminary result            Hospital Course 74 year male admitted  with influenza a infection, MAY and dehydration    -improved with IV fluids  -on RA and ambulating in room without difficulty  -will prescribe albuterol and cough medicine    MAY: improved with fluids, repeat BMP in one week follow up with PCP in one week    PAF: continue eliquis    Discharge home in stable condition. Greater than 30 minutes of clinical time spent caring for this patient.     Pertinent Physical Exam At Time of Discharge  Gen: NAD  HEENT: EOM, MMM  CV: RRR, no murmurs rubs or gallops  Resp: Clear to auscultation bilaterally  Abdomen: soft, NT,+BS  LE: No edema    Outpatient Follow-Up  Future Appointments   Date Time Provider Department Center   4/11/2025  1:45 PM Fred Badillo MD [id number] [place]           Trey Blanco MD

## 2024-12-26 ENCOUNTER — PATIENT OUTREACH (OUTPATIENT)
Dept: PRIMARY CARE | Facility: CLINIC | Age: 74
End: 2024-12-26
Payer: MEDICARE

## 2024-12-26 NOTE — PROGRESS NOTES
Discharge Facility:[facility]  Discharge Diagnosis:MAY  Admission Date:12/24/24  Discharge Date: 12/25/24    PCP Appointment Date:No contact made. Message sent to office  Specialist Appointment Date: TBD  Hospital Encounter and Summary Linked: Yes    2 call attempts made

## 2024-12-27 LAB
ATRIAL RATE: 90 BPM
P AXIS: 63 DEGREES
P OFFSET: 198 MS
P ONSET: 130 MS
PR INTERVAL: 182 MS
Q ONSET: 221 MS
QRS COUNT: 15 BEATS
QRS DURATION: 76 MS
QT INTERVAL: 356 MS
QTC CALCULATION(BAZETT): 435 MS
QTC FREDERICIA: 407 MS
R AXIS: 57 DEGREES
T AXIS: 67 DEGREES
T OFFSET: 399 MS
VENTRICULAR RATE: 90 BPM

## 2024-12-29 LAB
BACTERIA BLD CULT: NORMAL
BACTERIA BLD CULT: NORMAL

## 2025-01-02 ENCOUNTER — LAB (OUTPATIENT)
Dept: LAB | Facility: LAB | Age: 75
End: 2025-01-02
Payer: MEDICARE

## 2025-01-02 DIAGNOSIS — N17.9 AKI (ACUTE KIDNEY INJURY) (CMS-HCC): ICD-10-CM

## 2025-01-02 LAB
ANION GAP SERPL CALC-SCNC: 13 MMOL/L (ref 10–20)
BUN SERPL-MCNC: 18 MG/DL (ref 6–23)
CALCIUM SERPL-MCNC: 9 MG/DL (ref 8.6–10.3)
CHLORIDE SERPL-SCNC: 98 MMOL/L (ref 98–107)
CO2 SERPL-SCNC: 28 MMOL/L (ref 21–32)
CREAT SERPL-MCNC: 1.47 MG/DL (ref 0.5–1.3)
EGFRCR SERPLBLD CKD-EPI 2021: 50 ML/MIN/1.73M*2
GLUCOSE SERPL-MCNC: 153 MG/DL (ref 74–99)
POTASSIUM SERPL-SCNC: 4.1 MMOL/L (ref 3.5–5.3)
SODIUM SERPL-SCNC: 135 MMOL/L (ref 136–145)

## 2025-01-02 PROCEDURE — 80048 BASIC METABOLIC PNL TOTAL CA: CPT

## 2025-01-03 ENCOUNTER — APPOINTMENT (OUTPATIENT)
Dept: PRIMARY CARE | Facility: CLINIC | Age: 75
End: 2025-01-03
Payer: MEDICARE

## 2025-01-03 VITALS
WEIGHT: 217 LBS | HEIGHT: 70 IN | DIASTOLIC BLOOD PRESSURE: 60 MMHG | SYSTOLIC BLOOD PRESSURE: 110 MMHG | HEART RATE: 74 BPM | BODY MASS INDEX: 31.07 KG/M2

## 2025-01-03 DIAGNOSIS — N17.9 ACUTE KIDNEY INJURY (CMS-HCC): Primary | ICD-10-CM

## 2025-01-03 DIAGNOSIS — J10.1 INFLUENZA A: ICD-10-CM

## 2025-01-03 PROCEDURE — 3074F SYST BP LT 130 MM HG: CPT | Performed by: FAMILY MEDICINE

## 2025-01-03 PROCEDURE — 1123F ACP DISCUSS/DSCN MKR DOCD: CPT | Performed by: FAMILY MEDICINE

## 2025-01-03 PROCEDURE — 1036F TOBACCO NON-USER: CPT | Performed by: FAMILY MEDICINE

## 2025-01-03 PROCEDURE — 99213 OFFICE O/P EST LOW 20 MIN: CPT | Performed by: FAMILY MEDICINE

## 2025-01-03 PROCEDURE — 3008F BODY MASS INDEX DOCD: CPT | Performed by: FAMILY MEDICINE

## 2025-01-03 PROCEDURE — 1158F ADVNC CARE PLAN TLK DOCD: CPT | Performed by: FAMILY MEDICINE

## 2025-01-03 PROCEDURE — 1159F MED LIST DOCD IN RCRD: CPT | Performed by: FAMILY MEDICINE

## 2025-01-03 PROCEDURE — 3078F DIAST BP <80 MM HG: CPT | Performed by: FAMILY MEDICINE

## 2025-01-03 ASSESSMENT — PATIENT HEALTH QUESTIONNAIRE - PHQ9
1. LITTLE INTEREST OR PLEASURE IN DOING THINGS: NOT AT ALL
2. FEELING DOWN, DEPRESSED OR HOPELESS: NOT AT ALL
SUM OF ALL RESPONSES TO PHQ9 QUESTIONS 1 AND 2: 0

## 2025-01-03 ASSESSMENT — ENCOUNTER SYMPTOMS
RHINORRHEA: 1
DIZZINESS: 0
FATIGUE: 0
CHILLS: 0
HEADACHES: 0
SHORTNESS OF BREATH: 0
CHEST TIGHTNESS: 0

## 2025-01-03 NOTE — PROGRESS NOTES
"Subjective   Patient ID: Johann Casey is a 74 y.o. male who presents for Hospital Follow-up ([facility]).    Hospital follow up   - patient admitted to [facility] on 12/24/24   - initially diagnosed with influenza a and acute kidney injury   - was admitted for observation and kept overnight  - had fluid resuscitation and had improvement in his symptoms   - discharged home on 12/25/24   - today reports he has mild congestion and mild cough   - has been trying his best to stay well hydrated   - remains fever free since his discharge to home   - has been off alcohol since August of this year          Review of Systems   Constitutional:  Negative for chills and fatigue.   HENT:  Positive for rhinorrhea.    Respiratory:  Negative for chest tightness and shortness of breath.    Neurological:  Negative for dizziness and headaches.       Objective   /60   Pulse 74   Ht 1.778 m (5' 10\")   Wt 98.4 kg (217 lb)   BMI 31.14 kg/m²     Physical Exam  Constitutional:       General: He is not in acute distress.     Appearance: Normal appearance.   HENT:      Mouth/Throat:      Mouth: Mucous membranes are moist.      Pharynx: Oropharynx is clear.   Cardiovascular:      Rate and Rhythm: Normal rate and regular rhythm.   Neurological:      Mental Status: He is alert.   Psychiatric:         Mood and Affect: Mood normal.         Behavior: Behavior normal.         Assessment/Plan   Problem List Items Addressed This Visit    None  Visit Diagnoses         Codes    Acute kidney injury (CMS-HCC)    -  Primary N17.9    stable   - recheck bmp in one month   - f/u PRN     Influenza A     J10.1    stable   - clinically improving today   - f/u PRN                "

## 2025-01-07 ENCOUNTER — PATIENT OUTREACH (OUTPATIENT)
Dept: PRIMARY CARE | Facility: CLINIC | Age: 75
End: 2025-01-07
Payer: MEDICARE

## 2025-02-13 DIAGNOSIS — E78.2 MIXED HYPERLIPIDEMIA: ICD-10-CM

## 2025-02-15 DIAGNOSIS — I10 ESSENTIAL (PRIMARY) HYPERTENSION: ICD-10-CM

## 2025-02-19 RX ORDER — PRAVASTATIN SODIUM 20 MG/1
20 TABLET ORAL NIGHTLY
Qty: 90 TABLET | Refills: 1 | Status: SHIPPED | OUTPATIENT
Start: 2025-02-19

## 2025-02-20 RX ORDER — HYDROCHLOROTHIAZIDE 12.5 MG/1
12.5 CAPSULE ORAL DAILY
Qty: 90 CAPSULE | Refills: 1 | Status: SHIPPED | OUTPATIENT
Start: 2025-02-20

## 2025-03-10 DIAGNOSIS — J30.9 ALLERGIC RHINITIS, UNSPECIFIED: ICD-10-CM

## 2025-03-12 RX ORDER — DESLORATADINE 5 MG/1
5 TABLET ORAL DAILY
Qty: 90 TABLET | Refills: 0 | Status: SHIPPED | OUTPATIENT
Start: 2025-03-12

## 2025-04-11 ENCOUNTER — APPOINTMENT (OUTPATIENT)
Dept: CARDIOLOGY | Facility: CLINIC | Age: 75
End: 2025-04-11
Payer: MEDICARE

## 2025-04-18 ENCOUNTER — APPOINTMENT (OUTPATIENT)
Dept: CARDIOLOGY | Facility: CLINIC | Age: 75
End: 2025-04-18
Payer: MEDICARE

## 2025-04-22 ENCOUNTER — APPOINTMENT (OUTPATIENT)
Dept: CARDIOLOGY | Facility: CLINIC | Age: 75
End: 2025-04-22
Payer: MEDICARE

## 2025-04-22 VITALS
HEART RATE: 60 BPM | BODY MASS INDEX: 32.44 KG/M2 | DIASTOLIC BLOOD PRESSURE: 80 MMHG | SYSTOLIC BLOOD PRESSURE: 132 MMHG | WEIGHT: 219 LBS | HEIGHT: 69 IN

## 2025-04-22 DIAGNOSIS — Z78.9 NEVER SMOKED CIGARETTES: ICD-10-CM

## 2025-04-22 DIAGNOSIS — I10 BENIGN ESSENTIAL HYPERTENSION: ICD-10-CM

## 2025-04-22 DIAGNOSIS — E78.2 MIXED HYPERLIPIDEMIA: ICD-10-CM

## 2025-04-22 DIAGNOSIS — I25.10 CORONARY ARTERY DISEASE INVOLVING NATIVE CORONARY ARTERY OF NATIVE HEART WITHOUT ANGINA PECTORIS: ICD-10-CM

## 2025-04-22 DIAGNOSIS — I48.0 PAROXYSMAL ATRIAL FIBRILLATION (MULTI): ICD-10-CM

## 2025-04-22 PROCEDURE — 3079F DIAST BP 80-89 MM HG: CPT | Performed by: INTERNAL MEDICINE

## 2025-04-22 PROCEDURE — 99214 OFFICE O/P EST MOD 30 MIN: CPT | Performed by: INTERNAL MEDICINE

## 2025-04-22 PROCEDURE — 3075F SYST BP GE 130 - 139MM HG: CPT | Performed by: INTERNAL MEDICINE

## 2025-04-22 PROCEDURE — 3008F BODY MASS INDEX DOCD: CPT | Performed by: INTERNAL MEDICINE

## 2025-04-22 PROCEDURE — 1159F MED LIST DOCD IN RCRD: CPT | Performed by: INTERNAL MEDICINE

## 2025-04-22 PROCEDURE — 1123F ACP DISCUSS/DSCN MKR DOCD: CPT | Performed by: INTERNAL MEDICINE

## 2025-04-22 PROCEDURE — 1036F TOBACCO NON-USER: CPT | Performed by: INTERNAL MEDICINE

## 2025-04-22 NOTE — PATIENT INSTRUCTIONS
Patient to follow up in 1 year with Dr. Fred Badillo MD [facility]     No changes today.   Continue same medications and treatments.   Patient educated on proper medication use.   Patient educated on risk factor modification.   Please bring any lab results from other providers / physicians to your next appointment.     Please bring all medicines, vitamins, and herbal supplements with you when you come to the office.     Prescriptions will not be filled unless you are compliant with your follow up appointments or have a follow up appointment scheduled as per instruction of your physician. Refills should be requested at the time of your visit.    IDavis RN am scribing for and in the presence of Dr. Fred Badillo MD [facility]

## 2025-04-22 NOTE — PROGRESS NOTES
Referred by Dr. Zuluaga ref. provider found provider found for   Chief Complaint   Patient presents with    Follow-up     6 month follow up on Coronary artery disease involving native coronary artery of native heart without angina pectoris  Mixed hyperlipidemia  management        History of Present Illness  Johann Casey is a 74 y.o. year old male patient here for follow-up.  Doing well from a cardiac standpoint no complaint no symptoms of chest pain or shortness of breath.  Denies symptoms of palpitations syncope or presyncope.  Discussed with the patient Kneifl continue medication will call for any problem and follow-up as scheduled    Past Medical History  Medical History[1]    Social History  Social History[2]    Family History   Family History[3]    Review of Systems  As per HPI, all other systems reviewed and negative.    Allergies:  RX Allergies[4]     Outpatient Medications:  Current Outpatient Medications   Medication Instructions    albuterol (Ventolin HFA) 90 mcg/actuation inhaler 2 puffs, inhalation, Every 4 hours PRN    apixaban (ELIQUIS) 5 mg, oral, Every 12 hours    desloratadine (CLARINEX) 5 mg, oral, Daily    hydroCHLOROthiazide (MICROZIDE) 12.5 mg, oral, Daily    lisinopril 20 mg, oral, Daily    metoprolol succinate XL (TOPROL-XL) 50 mg, oral, Daily, Do not crush or chew.    pravastatin (PRAVACHOL) 20 mg, oral, Nightly         Vitals:  Vitals:    04/22/25 0811   BP: 132/80   Pulse: 60       Physical Exam:  Physical Exam  Constitutional:       Appearance: Normal appearance.   HENT:      Head: Normocephalic and atraumatic.   Eyes:      Extraocular Movements: Extraocular movements intact.      Pupils: Pupils are equal, round, and reactive to light.   Cardiovascular:      Rate and Rhythm: Normal rate and regular rhythm.      Pulses: Normal pulses.      Heart sounds: Normal heart sounds.   Pulmonary:      Effort: Pulmonary effort is normal.      Breath sounds: Normal breath sounds.   Abdominal:      General:  Abdomen is flat.      Palpations: Abdomen is soft.   Musculoskeletal:      Right lower leg: No edema.      Left lower leg: No edema.   Skin:     General: Skin is warm and dry.   Neurological:      General: No focal deficit present.      Mental Status: He is alert and oriented to person, place, and time.             Assessment/Plan   Diagnoses and all orders for this visit:  Paroxysmal atrial fibrillation (Multi)  Mixed hyperlipidemia  Benign essential hypertension  Coronary artery disease involving native coronary artery of native heart without angina pectoris  BMI 32.0-32.9,adult  Never smoked cigarettes      I,Davis Phelps RN   am scribing for, and in the presence of nzf .    I, Dr. Fred Badillo MD [facility] , personally performed the services described in the documentation as scribed by Davis Phelps RN   in my presence, and confirm it is both accurate and complete.      Fred Badillo MD [facility]  Interventional Cardiology   of [facility]     Thank you for allowing me to participate in the care of this patient. Please do not hesitate to contact me with any further questions or concerns.         [1]   Past Medical History:  Diagnosis Date    Arrhythmia     Cough 01/02/2018    Dermatitis medicamentosa 08/23/2024    Disorder of the skin and subcutaneous tissue, unspecified     Skin lesion    Generalized skin eruption due to drugs and medicaments taken internally     Drug rash    Headache 01/02/2018    Hyperlipidemia     Hypertension     Other specified abnormal findings of blood chemistry 02/25/2022    Abnormal LFTs    Other urticaria     Urticaria, acute    Peripheral edema 08/23/2024    Strain of muscle, fascia and tendon of other parts of biceps, unspecified arm, initial encounter 02/25/2022    Biceps muscle tear   [2]   Social History  Tobacco Use    Smoking status: Never    Smokeless tobacco: Never   Vaping Use    Vaping status: Never Used   Substance Use Topics    Alcohol use: Not Currently      Comment: quit 8/29/2024    Drug use: Never   [3]   Family History  Problem Relation Name Age of Onset    Hypertension Mother      Cancer Mother      Colon cancer Mother      Hypertension Father      Lung cancer Father      Cancer Father     [4]   Allergies  Allergen Reactions    Amoxicillin Rash    Losartan Rash

## 2025-04-30 ENCOUNTER — PATIENT MESSAGE (OUTPATIENT)
Dept: PRIMARY CARE | Facility: CLINIC | Age: 75
End: 2025-04-30
Payer: MEDICARE

## 2025-05-21 DIAGNOSIS — I48.3 TYPICAL ATRIAL FLUTTER (MULTI): ICD-10-CM

## 2025-05-21 DIAGNOSIS — I48.92 ATRIAL FLUTTER WITH RAPID VENTRICULAR RESPONSE (MULTI): ICD-10-CM

## 2025-05-21 DIAGNOSIS — I10 ESSENTIAL (PRIMARY) HYPERTENSION: ICD-10-CM

## 2025-05-21 DIAGNOSIS — E78.2 MIXED HYPERLIPIDEMIA: ICD-10-CM

## 2025-05-21 DIAGNOSIS — I50.20 HFREF (HEART FAILURE WITH REDUCED EJECTION FRACTION): ICD-10-CM

## 2025-05-21 DIAGNOSIS — I10 PRIMARY HYPERTENSION: ICD-10-CM

## 2025-05-21 RX ORDER — HYDROCHLOROTHIAZIDE 12.5 MG/1
12.5 CAPSULE ORAL DAILY
Qty: 90 CAPSULE | Refills: 3 | Status: SHIPPED | OUTPATIENT
Start: 2025-05-21 | End: 2026-05-21

## 2025-05-21 RX ORDER — PRAVASTATIN SODIUM 20 MG/1
20 TABLET ORAL NIGHTLY
Qty: 90 TABLET | Refills: 3 | Status: SHIPPED | OUTPATIENT
Start: 2025-05-21 | End: 2026-05-21

## 2025-05-21 RX ORDER — LISINOPRIL 20 MG/1
20 TABLET ORAL DAILY
Qty: 90 TABLET | Refills: 3 | Status: SHIPPED | OUTPATIENT
Start: 2025-05-21 | End: 2026-05-21

## 2025-05-21 RX ORDER — METOPROLOL SUCCINATE 50 MG/1
50 TABLET, EXTENDED RELEASE ORAL DAILY
Qty: 90 TABLET | Refills: 3 | Status: SHIPPED | OUTPATIENT
Start: 2025-05-21 | End: 2026-05-21

## 2025-05-21 NOTE — TELEPHONE ENCOUNTER
Received request for prescription refill for patient.  Patient follows with Dr. Fred Badillo MD, [facility]     Request is for all cardiac meds  Is patient currently on medication- yes    Last OV- 4/22/25  Next OV- 4/24/26    Patient did not leave name of pharmacy and has 2 local pharmacies starred on file. Returned call to patient. No answer. LM to call our office back with pharmacy info. Will attempt call again.

## 2025-05-21 NOTE — TELEPHONE ENCOUNTER
Placed call to patient again. No answer. LM that refills will be sent to [facility] on [facility].

## 2025-05-27 ENCOUNTER — APPOINTMENT (OUTPATIENT)
Dept: CARDIOLOGY | Facility: HOSPITAL | Age: 75
DRG: 314 | End: 2025-05-27
Payer: MEDICARE

## 2025-05-27 ENCOUNTER — APPOINTMENT (OUTPATIENT)
Dept: RADIOLOGY | Facility: HOSPITAL | Age: 75
DRG: 314 | End: 2025-05-27
Payer: MEDICARE

## 2025-05-27 ENCOUNTER — HOSPITAL ENCOUNTER (INPATIENT)
Facility: HOSPITAL | Age: 75
LOS: 3 days | Discharge: HOME | DRG: 314 | End: 2025-05-30
Attending: EMERGENCY MEDICINE | Admitting: INTERNAL MEDICINE
Payer: MEDICARE

## 2025-05-27 DIAGNOSIS — R74.8 CARDIAC ENZYMES ELEVATED: ICD-10-CM

## 2025-05-27 DIAGNOSIS — R55 NEAR SYNCOPE: ICD-10-CM

## 2025-05-27 DIAGNOSIS — I48.91 ATRIAL FIBRILLATION WITH RVR (MULTI): Primary | ICD-10-CM

## 2025-05-27 DIAGNOSIS — N17.9 AKI (ACUTE KIDNEY INJURY): ICD-10-CM

## 2025-05-27 DIAGNOSIS — I63.89 OTHER CEREBRAL INFARCTION: ICD-10-CM

## 2025-05-27 DIAGNOSIS — R42 DIZZINESS: ICD-10-CM

## 2025-05-27 DIAGNOSIS — G45.9 TIA (TRANSIENT ISCHEMIC ATTACK): ICD-10-CM

## 2025-05-27 DIAGNOSIS — I48.0 PAROXYSMAL A-FIB (MULTI): ICD-10-CM

## 2025-05-27 PROBLEM — Z91.148 NONCOMPLIANCE WITH MEDICATION REGIMEN: Status: ACTIVE | Noted: 2025-05-27

## 2025-05-27 PROBLEM — N18.9 ACUTE KIDNEY INJURY SUPERIMPOSED ON CHRONIC KIDNEY DISEASE: Status: ACTIVE | Noted: 2025-05-27

## 2025-05-27 PROBLEM — R79.89 ELEVATED TROPONIN: Status: ACTIVE | Noted: 2025-05-27

## 2025-05-27 LAB
ALBUMIN SERPL BCP-MCNC: 4.2 G/DL (ref 3.4–5)
ALP SERPL-CCNC: 43 U/L (ref 33–136)
ALT SERPL W P-5'-P-CCNC: 16 U/L (ref 10–52)
ANION GAP SERPL CALC-SCNC: 14 MMOL/L (ref 10–20)
APTT PPP: 30 SECONDS (ref 26–36)
AST SERPL W P-5'-P-CCNC: 16 U/L (ref 9–39)
BASOPHILS # BLD AUTO: 0.01 X10*3/UL (ref 0–0.1)
BASOPHILS NFR BLD AUTO: 0.1 %
BILIRUB SERPL-MCNC: 1.2 MG/DL (ref 0–1.2)
BNP SERPL-MCNC: 623 PG/ML (ref 0–99)
BUN SERPL-MCNC: 23 MG/DL (ref 6–23)
CALCIUM SERPL-MCNC: 9.8 MG/DL (ref 8.6–10.3)
CARDIAC TROPONIN I PNL SERPL HS: 30 NG/L (ref 0–20)
CARDIAC TROPONIN I PNL SERPL HS: 32 NG/L (ref 0–20)
CHLORIDE SERPL-SCNC: 96 MMOL/L (ref 98–107)
CO2 SERPL-SCNC: 26 MMOL/L (ref 21–32)
CREAT SERPL-MCNC: 2.23 MG/DL (ref 0.5–1.3)
EGFRCR SERPLBLD CKD-EPI 2021: 30 ML/MIN/1.73M*2
EOSINOPHIL # BLD AUTO: 0.02 X10*3/UL (ref 0–0.4)
EOSINOPHIL NFR BLD AUTO: 0.2 %
ERYTHROCYTE [DISTWIDTH] IN BLOOD BY AUTOMATED COUNT: 12.2 % (ref 11.5–14.5)
FLUAV RNA RESP QL NAA+PROBE: NOT DETECTED
FLUBV RNA RESP QL NAA+PROBE: NOT DETECTED
GLUCOSE SERPL-MCNC: 113 MG/DL (ref 74–99)
HCT VFR BLD AUTO: 43.6 % (ref 41–52)
HGB BLD-MCNC: 15.1 G/DL (ref 13.5–17.5)
IMM GRANULOCYTES # BLD AUTO: 0.03 X10*3/UL (ref 0–0.5)
IMM GRANULOCYTES NFR BLD AUTO: 0.3 % (ref 0–0.9)
INR PPP: 1.3 (ref 0.9–1.1)
LACTATE SERPL-SCNC: 2 MMOL/L (ref 0.4–2)
LYMPHOCYTES # BLD AUTO: 1.2 X10*3/UL (ref 0.8–3)
LYMPHOCYTES NFR BLD AUTO: 11.6 %
MAGNESIUM SERPL-MCNC: 1.88 MG/DL (ref 1.6–2.4)
MCH RBC QN AUTO: 34.4 PG (ref 26–34)
MCHC RBC AUTO-ENTMCNC: 34.6 G/DL (ref 32–36)
MCV RBC AUTO: 99 FL (ref 80–100)
MONOCYTES # BLD AUTO: 0.83 X10*3/UL (ref 0.05–0.8)
MONOCYTES NFR BLD AUTO: 8 %
NEUTROPHILS # BLD AUTO: 8.25 X10*3/UL (ref 1.6–5.5)
NEUTROPHILS NFR BLD AUTO: 79.8 %
NRBC BLD-RTO: 0 /100 WBCS (ref 0–0)
PLATELET # BLD AUTO: 199 X10*3/UL (ref 150–450)
POTASSIUM SERPL-SCNC: 5 MMOL/L (ref 3.5–5.3)
PROT SERPL-MCNC: 6.9 G/DL (ref 6.4–8.2)
PROTHROMBIN TIME: 14.3 SECONDS (ref 9.8–12.4)
RBC # BLD AUTO: 4.39 X10*6/UL (ref 4.5–5.9)
RSV RNA RESP QL NAA+PROBE: NOT DETECTED
SARS-COV-2 RNA RESP QL NAA+PROBE: NOT DETECTED
SODIUM SERPL-SCNC: 131 MMOL/L (ref 136–145)
WBC # BLD AUTO: 10.3 X10*3/UL (ref 4.4–11.3)

## 2025-05-27 PROCEDURE — 70450 CT HEAD/BRAIN W/O DYE: CPT

## 2025-05-27 PROCEDURE — 71045 X-RAY EXAM CHEST 1 VIEW: CPT

## 2025-05-27 PROCEDURE — 85730 THROMBOPLASTIN TIME PARTIAL: CPT | Performed by: EMERGENCY MEDICINE

## 2025-05-27 PROCEDURE — 83735 ASSAY OF MAGNESIUM: CPT | Performed by: EMERGENCY MEDICINE

## 2025-05-27 PROCEDURE — 2500000004 HC RX 250 GENERAL PHARMACY W/ HCPCS (ALT 636 FOR OP/ED): Mod: JZ | Performed by: EMERGENCY MEDICINE

## 2025-05-27 PROCEDURE — 99223 1ST HOSP IP/OBS HIGH 75: CPT | Performed by: INTERNAL MEDICINE

## 2025-05-27 PROCEDURE — 96374 THER/PROPH/DIAG INJ IV PUSH: CPT

## 2025-05-27 PROCEDURE — 93005 ELECTROCARDIOGRAM TRACING: CPT

## 2025-05-27 PROCEDURE — 84484 ASSAY OF TROPONIN QUANT: CPT | Performed by: EMERGENCY MEDICINE

## 2025-05-27 PROCEDURE — 96361 HYDRATE IV INFUSION ADD-ON: CPT

## 2025-05-27 PROCEDURE — 80053 COMPREHEN METABOLIC PANEL: CPT | Performed by: EMERGENCY MEDICINE

## 2025-05-27 PROCEDURE — 85025 COMPLETE CBC W/AUTO DIFF WBC: CPT | Performed by: EMERGENCY MEDICINE

## 2025-05-27 PROCEDURE — 70450 CT HEAD/BRAIN W/O DYE: CPT | Performed by: STUDENT IN AN ORGANIZED HEALTH CARE EDUCATION/TRAINING PROGRAM

## 2025-05-27 PROCEDURE — 71045 X-RAY EXAM CHEST 1 VIEW: CPT | Performed by: RADIOLOGY

## 2025-05-27 PROCEDURE — 36415 COLL VENOUS BLD VENIPUNCTURE: CPT | Performed by: EMERGENCY MEDICINE

## 2025-05-27 PROCEDURE — 2500000004 HC RX 250 GENERAL PHARMACY W/ HCPCS (ALT 636 FOR OP/ED): Mod: JZ | Performed by: INTERNAL MEDICINE

## 2025-05-27 PROCEDURE — 83880 ASSAY OF NATRIURETIC PEPTIDE: CPT | Performed by: EMERGENCY MEDICINE

## 2025-05-27 PROCEDURE — 99285 EMERGENCY DEPT VISIT HI MDM: CPT | Mod: 25 | Performed by: EMERGENCY MEDICINE

## 2025-05-27 PROCEDURE — 87637 SARSCOV2&INF A&B&RSV AMP PRB: CPT | Performed by: EMERGENCY MEDICINE

## 2025-05-27 PROCEDURE — 99291 CRITICAL CARE FIRST HOUR: CPT | Performed by: EMERGENCY MEDICINE

## 2025-05-27 PROCEDURE — 85610 PROTHROMBIN TIME: CPT | Performed by: EMERGENCY MEDICINE

## 2025-05-27 PROCEDURE — 2060000001 HC INTERMEDIATE ICU ROOM DAILY

## 2025-05-27 PROCEDURE — 2500000001 HC RX 250 WO HCPCS SELF ADMINISTERED DRUGS (ALT 637 FOR MEDICARE OP): Performed by: EMERGENCY MEDICINE

## 2025-05-27 PROCEDURE — 83605 ASSAY OF LACTIC ACID: CPT | Performed by: EMERGENCY MEDICINE

## 2025-05-27 RX ORDER — NAPROXEN SODIUM 220 MG/1
324 TABLET, FILM COATED ORAL ONCE
Status: COMPLETED | OUTPATIENT
Start: 2025-05-27 | End: 2025-05-27

## 2025-05-27 RX ORDER — METOPROLOL TARTRATE 1 MG/ML
5 INJECTION, SOLUTION INTRAVENOUS EVERY 5 MIN PRN
Status: DISCONTINUED | OUTPATIENT
Start: 2025-05-27 | End: 2025-05-30 | Stop reason: HOSPADM

## 2025-05-27 RX ORDER — PANTOPRAZOLE SODIUM 40 MG/10ML
40 INJECTION, POWDER, LYOPHILIZED, FOR SOLUTION INTRAVENOUS
Status: DISCONTINUED | OUTPATIENT
Start: 2025-05-28 | End: 2025-05-29

## 2025-05-27 RX ORDER — SODIUM CHLORIDE, SODIUM LACTATE, POTASSIUM CHLORIDE, CALCIUM CHLORIDE 600; 310; 30; 20 MG/100ML; MG/100ML; MG/100ML; MG/100ML
75 INJECTION, SOLUTION INTRAVENOUS CONTINUOUS
Status: DISCONTINUED | OUTPATIENT
Start: 2025-05-27 | End: 2025-05-28

## 2025-05-27 RX ORDER — ONDANSETRON HYDROCHLORIDE 2 MG/ML
4 INJECTION, SOLUTION INTRAVENOUS EVERY 8 HOURS PRN
Status: DISCONTINUED | OUTPATIENT
Start: 2025-05-27 | End: 2025-05-30 | Stop reason: HOSPADM

## 2025-05-27 RX ORDER — ACETAMINOPHEN 160 MG/5ML
650 SOLUTION ORAL EVERY 4 HOURS PRN
Status: DISCONTINUED | OUTPATIENT
Start: 2025-05-27 | End: 2025-05-30 | Stop reason: HOSPADM

## 2025-05-27 RX ORDER — ACETAMINOPHEN 325 MG/1
650 TABLET ORAL EVERY 4 HOURS PRN
Status: DISCONTINUED | OUTPATIENT
Start: 2025-05-27 | End: 2025-05-30 | Stop reason: HOSPADM

## 2025-05-27 RX ORDER — TALC
3 POWDER (GRAM) TOPICAL NIGHTLY PRN
Status: DISCONTINUED | OUTPATIENT
Start: 2025-05-27 | End: 2025-05-30 | Stop reason: HOSPADM

## 2025-05-27 RX ORDER — ONDANSETRON 4 MG/1
4 TABLET, FILM COATED ORAL EVERY 8 HOURS PRN
Status: DISCONTINUED | OUTPATIENT
Start: 2025-05-27 | End: 2025-05-30 | Stop reason: HOSPADM

## 2025-05-27 RX ORDER — HEPARIN SODIUM 10000 [USP'U]/100ML
0-4000 INJECTION, SOLUTION INTRAVENOUS CONTINUOUS
Status: DISCONTINUED | OUTPATIENT
Start: 2025-05-27 | End: 2025-05-29

## 2025-05-27 RX ORDER — POLYETHYLENE GLYCOL 3350 17 G/17G
17 POWDER, FOR SOLUTION ORAL DAILY PRN
Status: DISCONTINUED | OUTPATIENT
Start: 2025-05-27 | End: 2025-05-30 | Stop reason: HOSPADM

## 2025-05-27 RX ORDER — PANTOPRAZOLE SODIUM 40 MG/1
40 TABLET, DELAYED RELEASE ORAL
Status: DISCONTINUED | OUTPATIENT
Start: 2025-05-28 | End: 2025-05-30 | Stop reason: HOSPADM

## 2025-05-27 RX ORDER — ACETAMINOPHEN 650 MG/1
650 SUPPOSITORY RECTAL EVERY 4 HOURS PRN
Status: DISCONTINUED | OUTPATIENT
Start: 2025-05-27 | End: 2025-05-30 | Stop reason: HOSPADM

## 2025-05-27 RX ADMIN — METOPROLOL TARTRATE 5 MG: 5 INJECTION INTRAVENOUS at 20:01

## 2025-05-27 RX ADMIN — SODIUM CHLORIDE 500 ML: 0.9 INJECTION, SOLUTION INTRAVENOUS at 19:46

## 2025-05-27 RX ADMIN — ASPIRIN 324 MG: 81 TABLET, CHEWABLE ORAL at 19:13

## 2025-05-27 RX ADMIN — SODIUM CHLORIDE, SODIUM LACTATE, POTASSIUM CHLORIDE, AND CALCIUM CHLORIDE 75 ML/HR: .6; .31; .03; .02 INJECTION, SOLUTION INTRAVENOUS at 22:57

## 2025-05-27 RX ADMIN — SODIUM CHLORIDE 500 ML: 0.9 INJECTION, SOLUTION INTRAVENOUS at 17:04

## 2025-05-27 RX ADMIN — HEPARIN SODIUM 1200 UNITS/HR: 10000 INJECTION, SOLUTION INTRAVENOUS at 21:15

## 2025-05-27 RX ADMIN — METOPROLOL TARTRATE 5 MG: 5 INJECTION INTRAVENOUS at 19:36

## 2025-05-27 SDOH — SOCIAL STABILITY: SOCIAL INSECURITY: HAVE YOU HAD THOUGHTS OF HARMING ANYONE ELSE?: NO

## 2025-05-27 SDOH — SOCIAL STABILITY: SOCIAL INSECURITY: ARE THERE ANY APPARENT SIGNS OF INJURIES/BEHAVIORS THAT COULD BE RELATED TO ABUSE/NEGLECT?: NO

## 2025-05-27 SDOH — SOCIAL STABILITY: SOCIAL INSECURITY: DO YOU FEEL UNSAFE GOING BACK TO THE PLACE WHERE YOU ARE LIVING?: NO

## 2025-05-27 SDOH — SOCIAL STABILITY: SOCIAL INSECURITY: ABUSE: ADULT

## 2025-05-27 SDOH — SOCIAL STABILITY: SOCIAL INSECURITY: WERE YOU ABLE TO COMPLETE ALL THE BEHAVIORAL HEALTH SCREENINGS?: YES

## 2025-05-27 SDOH — SOCIAL STABILITY: SOCIAL INSECURITY: DOES ANYONE TRY TO KEEP YOU FROM HAVING/CONTACTING OTHER FRIENDS OR DOING THINGS OUTSIDE YOUR HOME?: NO

## 2025-05-27 SDOH — SOCIAL STABILITY: SOCIAL INSECURITY: ARE YOU OR HAVE YOU BEEN THREATENED OR ABUSED PHYSICALLY, EMOTIONALLY, OR SEXUALLY BY ANYONE?: NO

## 2025-05-27 SDOH — SOCIAL STABILITY: SOCIAL INSECURITY: DO YOU FEEL ANYONE HAS EXPLOITED OR TAKEN ADVANTAGE OF YOU FINANCIALLY OR OF YOUR PERSONAL PROPERTY?: NO

## 2025-05-27 SDOH — SOCIAL STABILITY: SOCIAL INSECURITY: HAS ANYONE EVER THREATENED TO HURT YOUR FAMILY OR YOUR PETS?: NO

## 2025-05-27 ASSESSMENT — ACTIVITIES OF DAILY LIVING (ADL)
WALKS IN HOME: INDEPENDENT
ADEQUATE_TO_COMPLETE_ADL: YES
LACK_OF_TRANSPORTATION: NO
BATHING: INDEPENDENT
FEEDING YOURSELF: INDEPENDENT
DRESSING YOURSELF: INDEPENDENT
PATIENT'S MEMORY ADEQUATE TO SAFELY COMPLETE DAILY ACTIVITIES?: YES
TOILETING: INDEPENDENT
HEARING - RIGHT EAR: FUNCTIONAL
GROOMING: INDEPENDENT
JUDGMENT_ADEQUATE_SAFELY_COMPLETE_DAILY_ACTIVITIES: YES
HEARING - LEFT EAR: FUNCTIONAL

## 2025-05-27 ASSESSMENT — LIFESTYLE VARIABLES
HOW MANY STANDARD DRINKS CONTAINING ALCOHOL DO YOU HAVE ON A TYPICAL DAY: 1 OR 2
HOW OFTEN DO YOU HAVE 6 OR MORE DRINKS ON ONE OCCASION: NEVER
AUDIT-C TOTAL SCORE: 4
EVER HAD A DRINK FIRST THING IN THE MORNING TO STEADY YOUR NERVES TO GET RID OF A HANGOVER: NO
AUDIT-C TOTAL SCORE: 4
HAVE YOU EVER FELT YOU SHOULD CUT DOWN ON YOUR DRINKING: NO
TOTAL SCORE: 0
HOW OFTEN DO YOU HAVE A DRINK CONTAINING ALCOHOL: 4 OR MORE TIMES A WEEK
EVER FELT BAD OR GUILTY ABOUT YOUR DRINKING: NO
SKIP TO QUESTIONS 9-10: 1
HAVE PEOPLE ANNOYED YOU BY CRITICIZING YOUR DRINKING: NO

## 2025-05-27 ASSESSMENT — PAIN - FUNCTIONAL ASSESSMENT: PAIN_FUNCTIONAL_ASSESSMENT: 0-10

## 2025-05-27 ASSESSMENT — PAIN SCALES - GENERAL
PAINLEVEL_OUTOF10: 0 - NO PAIN
PAINLEVEL_OUTOF10: 0 - NO PAIN

## 2025-05-27 NOTE — ED PROVIDER NOTES
74-year-old male presents emergency department with chief complaint of feeling unwell.  Patient states that today while at work whenever he went to stand and walk he would feel lightheaded.  He describes his lightheadedness as feeling off balance like he may fall.  He reports a room spinning like sensation.  Denies falling or hitting his head.  Patient also states that he feels short of breath even with minimal exertion.  At home he found his heart rate to be tachycardic.  He does report history of atrial fibrillation with previous cardioversion.  He is on Eliquis and metoprolol, but admits he has missed 2 days of these medications.  He did take his Eliquis this morning.  Patient is accompanied by significant other who also reports that on Thursday while they were at dinner patient was noted to have facial droop on the left side and slurred speech.  This has resolved since then. Patient denies any chest pain.  No abdominal pain, nausea, or vomiting.  No dysuria, diarrhea, constipation, black or bloody stools.  Patient has significant past medical history for alcohol abuse, hypertension, hyperlipidemia, osteoarthritis, elevated BMI, CAD, aortic root aneurysm, atrial flutter, paroxysmal A-fib, and CHF.  No report of tobacco use, illicit drug use, or regular alcohol use.      History provided by:  Patient   used: No           ------------------------------------------------------------------------------------------------------------------------------------------    VS: As documented in the triage note and EMR flowsheet from this visit were reviewed.    Review of Systems  Constitutional: no fever, chills reports malaise and fatigue as well as diaphoresis during episode  Eyes: no redness, discharge, pain reports sensitivity to light  HENT: no sore throat, nose bleeds, congestion, rhinorrhea   Cardiovascular: no chest pain, leg edema, admits to palpitations  Respiratory: Reports shortness of breath no  cough, wheezing  GI: no nausea, diarrhea, pain, vomiting, constipation, BRBPR, melena  : no dysuria, frequency, hematuria  Musculoskeletal: no neck pain, stiffness,  no joint deformity, swelling  Skin: no rash, erythema, wounds  Neurological: no headache,  numbness, or tingling reports feeling dizzy/lightheaded and generally weak.  Admits to episode of slurred speech and facial droop  Psychiatric: no suicidal thoughts, confusion, agitation  Metabolic: no polyuria or polydipsia  Hematologic: Patient is on Eliquis  Immunology: No immunocompromise state    [facility]  Nursing notes reviewed and confirmed by me.  Chart review performed including medications, allergies, and medical, surgical, and family history  Visit Vitals  /77   Pulse 81   Temp 36.7 °C (98.1 °F)   Resp 20     Physical Exam  Vitals and nursing note reviewed.   Constitutional:       General: He is not in acute distress.     Appearance: Normal appearance. He is not ill-appearing.   HENT:      Head: Normocephalic and atraumatic.      Right Ear: External ear normal.      Left Ear: External ear normal.      Nose: Nose normal. No congestion or rhinorrhea.      Mouth/Throat:      Mouth: Mucous membranes are moist.      Pharynx: No oropharyngeal exudate or posterior oropharyngeal erythema.   Eyes:      Extraocular Movements: Extraocular movements intact.      Conjunctiva/sclera: Conjunctivae normal.      Pupils: Pupils are equal, round, and reactive to light.   Cardiovascular:      Rate and Rhythm: Normal rate. Rhythm irregular.      Pulses: Normal pulses.      Heart sounds: Normal heart sounds.   Pulmonary:      Effort: Pulmonary effort is normal. No respiratory distress.      Breath sounds: No stridor. No wheezing, rhonchi or rales.      Comments: Breath sounds somewhat diminished in the bases bilaterally.  Abdominal:      General: There is no distension.      Palpations: Abdomen is soft.      Tenderness: There is no abdominal tenderness. There is no  guarding or rebound.   Musculoskeletal:         General: No swelling or deformity. Normal range of motion.      Cervical back: Normal range of motion and neck supple. No rigidity.      Right lower leg: No edema.      Left lower leg: No edema.      Comments: No calf tenderness    Skin:     General: Skin is warm and dry.      Capillary Refill: Capillary refill takes less than 2 seconds.      Coloration: Skin is not jaundiced.      Findings: No rash.   Neurological:      General: No focal deficit present.      Mental Status: He is alert and oriented to person, place, and time.      Sensory: No sensory deficit.      Motor: No weakness.      Comments: NIH stroke scale is 0   Psychiatric:         Mood and Affect: Mood normal.         Behavior: Behavior normal.        Medical History[1]   Surgical History[2]   Social History[3]   ------------------------------------------------------------------------------------------------------------------------------------------  CT head wo IV contrast   Final Result   No CT evidence of acute intracranial abnormality.        Chronic senescent changes including white matter disease commonly   attributed to chronic microvascular ischemia and age-appropriate   volume loss.                  MACRO:   None        Signed by: Clinton Babcock 5/27/2025 5:47 PM   Dictation workstation:   [id number]      XR chest 1 view   Final Result   1.  No evidence of acute cardiopulmonary process.             Signed by: Apolinar Eisenberg 5/27/2025 5:37 PM   Dictation workstation:   [id number]      Transthoracic Echo Complete    (Results Pending)   MR brain wo IV contrast    (Results Pending)   MR angio head wo IV contrast    (Results Pending)   MR angio neck wo IV contrast    (Results Pending)      Labs Reviewed   CBC WITH AUTO DIFFERENTIAL - Abnormal       Result Value    WBC 10.3      nRBC 0.0      RBC 4.39 (*)     Hemoglobin 15.1      Hematocrit 43.6      MCV 99      MCH 34.4 (*)     MCHC 34.6      RDW 12.2       Platelets 199      Neutrophils % 79.8      Immature Granulocytes %, Automated 0.3      Lymphocytes % 11.6      Monocytes % 8.0      Eosinophils % 0.2      Basophils % 0.1      Neutrophils Absolute 8.25 (*)     Immature Granulocytes Absolute, Automated 0.03      Lymphocytes Absolute 1.20      Monocytes Absolute 0.83 (*)     Eosinophils Absolute 0.02      Basophils Absolute 0.01     COMPREHENSIVE METABOLIC PANEL - Abnormal    Glucose 113 (*)     Sodium 131 (*)     Potassium 5.0      Chloride 96 (*)     Bicarbonate 26      Anion Gap 14      Urea Nitrogen 23      Creatinine 2.23 (*)     eGFR 30 (*)     Calcium 9.8      Albumin 4.2      Alkaline Phosphatase 43      Total Protein 6.9      AST 16      Bilirubin, Total 1.2      ALT 16     PROTIME-INR - Abnormal    Protime 14.3 (*)     INR 1.3 (*)    B-TYPE NATRIURETIC PEPTIDE - Abnormal     (*)     Narrative:        <100 pg/mL - Heart failure unlikely  100-299 pg/mL - Intermediate probability of acute heart                  failure exacerbation. Correlate with clinical                  context and patient history.    >=300 pg/mL - Heart Failure likely. Correlate with clinical                  context and patient history.    BNP testing is performed using different testing methodology at [facility] than at other [facility]. Direct result comparisons should only be made within the same method.      SERIAL TROPONIN-INITIAL - Abnormal    Troponin I, High Sensitivity 30 (*)     Narrative:     Less than 99th percentile of normal range cutoff-  Female and children under 18 years old <14 ng/L; Male <21 ng/L: Negative  Repeat testing should be performed if clinically indicated.     Female and children under 18 years old 14-50 ng/L; Male 21-50 ng/L:  Consistent with possible cardiac damage and possible increased clinical   risk. Serial measurements may help to assess extent of myocardial damage.     >50 ng/L: Consistent with cardiac damage, increased  clinical risk and  myocardial infarction. Serial measurements may help assess extent of   myocardial damage.      NOTE: Children less than 1 year old may have higher baseline troponin   levels and results should be interpreted in conjunction with the overall   clinical context.     NOTE: Troponin I testing is performed using a different   testing methodology at [facility] than at other   [facility]. Direct result comparisons should only   be made within the same method.   SERIAL TROPONIN, 1 HOUR - Abnormal    Troponin I, High Sensitivity 32 (*)     Narrative:     Less than 99th percentile of normal range cutoff-  Female and children under 18 years old <14 ng/L; Male <21 ng/L: Negative  Repeat testing should be performed if clinically indicated.     Female and children under 18 years old 14-50 ng/L; Male 21-50 ng/L:  Consistent with possible cardiac damage and possible increased clinical   risk. Serial measurements may help to assess extent of myocardial damage.     >50 ng/L: Consistent with cardiac damage, increased clinical risk and  myocardial infarction. Serial measurements may help assess extent of   myocardial damage.      NOTE: Children less than 1 year old may have higher baseline troponin   levels and results should be interpreted in conjunction with the overall   clinical context.     NOTE: Troponin I testing is performed using a different   testing methodology at [facility] than at other   [facility]. Direct result comparisons should only   be made within the same method.   HEPARIN ASSAY - Abnormal    Heparin Unfractionated 1.8 (*)     Narrative:     The therapeutic reference range for UFH may be either 0.3-0.6 IU/mL or 0.3-0.7 IU/mL based on the clinical setting for anticoagulant therapy and the associated nomogram used. For Heparin dosing guidelines based on clinical scenario and Heparin Assay results, please refer to local Pharmacy and the [facility]  Guidelines for Anticoagulation Therapy available on the [facility] intranet at: [email or web address]   MAGNESIUM - Normal    Magnesium 1.88     APTT - Normal    aPTT 30      Narrative:     The APTT is no longer used for monitoring Unfractionated Heparin Therapy. For monitoring Heparin Therapy, use the Heparin Assay.   LACTATE - Normal    Lactate 2.0      Narrative:     Venipuncture immediately after or during the administration of Metamizole may lead to falsely low results. Testing should be performed immediately prior to Metamizole dosing.   SARS-COV-2, INFLUENZA A/B AND RSV PCR - Normal    Coronavirus 2019, PCR Not Detected      Flu A Result Not Detected      Flu B Result Not Detected      RSV PCR Not Detected      Narrative:     This assay is an FDA-cleared, in vitro diagnostic nucleic acid amplification test for the qualitative detection and differentiation of SARS CoV-2/ Influenza A/B/ RSV from nasopharyngeal specimens collected from individuals with signs and symptoms of respiratory tract infections, and has been validated for use at [facility]. Negative results do not preclude COVID-19/ Influenza A/B/ RSV infections and should not be used as the sole basis for diagnosis, treatment, or other management decisions. Testing for SARS CoV-2 is recommended only for patients who meet current clinical and/or epidemiological criteria defined by federal, state, or local public health directives.   TROPONIN SERIES- (INITIAL, 1 HR)    Narrative:     The following orders were created for panel order Troponin I Series, High Sensitivity (0, 1 HR).  Procedure                               Abnormality         Status                     ---------                               -----------         ------                     Troponin I, High Sensiti...[[id number]]  Abnormal            Final result               Troponin, High  Sensitivi...[[id number]]  Abnormal            Final result                 Please view results for these tests on the individual orders.   CBC   BASIC METABOLIC PANEL        Medical Decision Making  EKG interpreted by ED physician: Atrial fibrillation with RVR rate of 128.  QRS QTc within normal range.  No significant ST elevations or depressions.  No significant Q waves.  Good R wave progression.  Normal axis.    Repeat EKG interpreted by ED physician: Atrial fibrillation with rapid ventricular response rate of 114.  QRS QTc within normal range.  No significant ST elevations or depressions.  No significant Q waves.  Good R wave progression.  Normal axis.    74-year-old male presents emergency department with chief complaint of feeling generally unwell.  He reports lightheadedness and is found to be in A-fib RVR.  In addition, family reports episode of slurred speech and facial droop on Thursday concerning for TIA.  Patient is on Eliquis and metoprolol, but admits to not taking these medications regularly.  Given presenting complaints a thorough workup is obtained.  NIH stroke scale is 0.  Cardiac enzymes x 2 are mildly not significantly increasing.  EKG does not show acute ACS but does show findings of A-fib RVR.  This is treated with metoprolol IV pushes and IV fluid bolus.  CMP shows mild hyponatremia and findings of acute on chronic kidney injury.  Patient given fluids.  BNP is elevated though nonspecific given patient's kidney function.  He does not appear acutely fluid overload on my exam.  COVID, flu, or single negative.  CBC does not show significant leukocytosis or anemia.  I do not suspect sepsis.  Chest x-ray shows no acute cardiopulmonary process such as pneumonia, pleural effusion, or pulmonary edema.  Head CT did not show any acute intracranial process such as hemorrhage or mass effect though I do have suspicion for TIA versus small stroke.  Patient is given aspirin for elevated enzymes and TIA-like  symptoms.  Recommended for further evaluation and treatment of his symptoms.  Patient and family agreeable with this plan.  Case discussed with hospitalist on-call.       Diagnoses as of 05/28/25 0155   Atrial fibrillation with RVR (Multi)   Cardiac enzymes elevated   Dizziness   TIA (transient ischemic attack)   MAY (acute kidney injury)      1. Atrial fibrillation with RVR (Multi)        2. Cardiac enzymes elevated        3. Dizziness        4. TIA (transient ischemic attack)        5. MAY (acute kidney injury)        6. Near syncope  Transthoracic Echo Complete    Transthoracic Echo Complete         Critical Care    Performed by: Dudley Watson DO  Authorized by: Dudley Watson DO    Critical care provider statement:     Critical care time (minutes):  31    Critical care time was exclusive of:  Separately billable procedures and treating other patients    Critical care was necessary to treat or prevent imminent or life-threatening deterioration of the following conditions:  Cardiac failure and CNS failure or compromise (TIA  and Afib RVR)    Critical care was time spent personally by me on the following activities:  Development of treatment plan with patient or surrogate, evaluation of patient's response to treatment, examination of patient, re-evaluation of patient's condition, pulse oximetry, ordering and review of radiographic studies, ordering and review of laboratory studies and ordering and performing treatments and interventions    Care discussed with: admitting provider         This note was dictated using dragon software and may contain errors related to dictation interpretation errors.        [1]   Past Medical History:  Diagnosis Date    Arrhythmia     Cough 01/02/2018    Dermatitis medicamentosa 08/23/2024    Disorder of the skin and subcutaneous tissue, unspecified     Skin lesion    Generalized skin eruption due to drugs and medicaments taken internally     Drug rash    Headache 01/02/2018     Hyperlipidemia     Hypertension     Other specified abnormal findings of blood chemistry 02/25/2022    Abnormal LFTs    Other urticaria     Urticaria, acute    Peripheral edema 08/23/2024    Strain of muscle, fascia and tendon of other parts of biceps, unspecified arm, initial encounter 02/25/2022    Biceps muscle tear   [2]   Past Surgical History:  Procedure Laterality Date    CARDIOVERSION  08/29/2024    CATARACT EXTRACTION, BILATERAL Left 01/25/2024    CATARACT EXTRACTION, BILATERAL Right 01/18/2024    OTHER SURGICAL HISTORY  02/08/2022    Skin biopsy    OTHER SURGICAL HISTORY  02/08/2022    Tonsillectomy    OTHER SURGICAL HISTORY  03/01/2022    Vasectomy   [3]   Social History  Socioeconomic History    Marital status: Single   Tobacco Use    Smoking status: Never    Smokeless tobacco: Never   Vaping Use    Vaping status: Never Used   Substance and Sexual Activity    Alcohol use: Not Currently     Comment: quit 8/29/2024    Drug use: Never    Sexual activity: Defer     Social Drivers of Health     Financial Resource Strain: Low Risk  (5/27/2025)    Overall Financial Resource Strain (CARDIA)     Difficulty of Paying Living Expenses: Not very hard   Food Insecurity: No Food Insecurity (12/24/2024)    Hunger Vital Sign     Worried About Running Out of Food in the Last Year: Never true     Ran Out of Food in the Last Year: Never true   Transportation Needs: No Transportation Needs (5/27/2025)    PRAPARE - Transportation     Lack of Transportation (Medical): No     Lack of Transportation (Non-Medical): No   Intimate Partner Violence: Not At Risk (12/24/2024)    Humiliation, Afraid, Rape, and Kick questionnaire     Fear of Current or Ex-Partner: No     Emotionally Abused: No     Physically Abused: No     Sexually Abused: No   Housing Stability: Low Risk  (5/27/2025)    Housing Stability Vital Sign     Unable to Pay for Housing in the Last Year: No     Number of Times Moved in the Last Year: 0     Homeless in the Last  Year: Margareth Watson,   05/28/25 0155

## 2025-05-28 ENCOUNTER — APPOINTMENT (OUTPATIENT)
Dept: RADIOLOGY | Facility: HOSPITAL | Age: 75
DRG: 314 | End: 2025-05-28
Payer: MEDICARE

## 2025-05-28 LAB
ANION GAP SERPL CALC-SCNC: 14 MMOL/L (ref 10–20)
ATRIAL RATE: 102 BPM
ATRIAL RATE: 300 BPM
BUN SERPL-MCNC: 23 MG/DL (ref 6–23)
CALCIUM SERPL-MCNC: 9.3 MG/DL (ref 8.6–10.3)
CHLORIDE SERPL-SCNC: 98 MMOL/L (ref 98–107)
CO2 SERPL-SCNC: 24 MMOL/L (ref 21–32)
CREAT SERPL-MCNC: 1.74 MG/DL (ref 0.5–1.3)
EGFRCR SERPLBLD CKD-EPI 2021: 41 ML/MIN/1.73M*2
ERYTHROCYTE [DISTWIDTH] IN BLOOD BY AUTOMATED COUNT: 12.2 % (ref 11.5–14.5)
GLUCOSE SERPL-MCNC: 91 MG/DL (ref 74–99)
HCT VFR BLD AUTO: 41.2 % (ref 41–52)
HGB BLD-MCNC: 14.3 G/DL (ref 13.5–17.5)
MCH RBC QN AUTO: 34.7 PG (ref 26–34)
MCHC RBC AUTO-ENTMCNC: 34.7 G/DL (ref 32–36)
MCV RBC AUTO: 100 FL (ref 80–100)
NRBC BLD-RTO: 0 /100 WBCS (ref 0–0)
PLATELET # BLD AUTO: 123 X10*3/UL (ref 150–450)
POTASSIUM SERPL-SCNC: 4.5 MMOL/L (ref 3.5–5.3)
Q ONSET: 222 MS
Q ONSET: 222 MS
QRS COUNT: 19 BEATS
QRS COUNT: 21 BEATS
QRS DURATION: 70 MS
QRS DURATION: 82 MS
QT INTERVAL: 274 MS
QT INTERVAL: 316 MS
QTC CALCULATION(BAZETT): 400 MS
QTC CALCULATION(BAZETT): 435 MS
QTC FREDERICIA: 353 MS
QTC FREDERICIA: 391 MS
R AXIS: 54 DEGREES
R AXIS: 59 DEGREES
RBC # BLD AUTO: 4.12 X10*6/UL (ref 4.5–5.9)
SODIUM SERPL-SCNC: 131 MMOL/L (ref 136–145)
T AXIS: 66 DEGREES
T AXIS: 84 DEGREES
T OFFSET: 359 MS
T OFFSET: 380 MS
UFH PPP CHRO-ACNC: 0.5 IU/ML (ref ?–1.1)
UFH PPP CHRO-ACNC: 0.6 IU/ML (ref ?–1.1)
UFH PPP CHRO-ACNC: 0.8 IU/ML (ref ?–1.1)
UFH PPP CHRO-ACNC: 0.9 IU/ML (ref ?–1.1)
UFH PPP CHRO-ACNC: 1.1 IU/ML (ref ?–1.1)
UFH PPP CHRO-ACNC: 1.8 IU/ML (ref ?–1.1)
VENTRICULAR RATE: 114 BPM
VENTRICULAR RATE: 128 BPM
WBC # BLD AUTO: 5.4 X10*3/UL (ref 4.4–11.3)

## 2025-05-28 PROCEDURE — 70547 MR ANGIOGRAPHY NECK W/O DYE: CPT

## 2025-05-28 PROCEDURE — 70544 MR ANGIOGRAPHY HEAD W/O DYE: CPT

## 2025-05-28 PROCEDURE — 85027 COMPLETE CBC AUTOMATED: CPT | Performed by: INTERNAL MEDICINE

## 2025-05-28 PROCEDURE — 2060000001 HC INTERMEDIATE ICU ROOM DAILY

## 2025-05-28 PROCEDURE — 2500000001 HC RX 250 WO HCPCS SELF ADMINISTERED DRUGS (ALT 637 FOR MEDICARE OP): Performed by: NURSE PRACTITIONER

## 2025-05-28 PROCEDURE — 85520 HEPARIN ASSAY: CPT | Performed by: INTERNAL MEDICINE

## 2025-05-28 PROCEDURE — 70551 MRI BRAIN STEM W/O DYE: CPT | Performed by: RADIOLOGY

## 2025-05-28 PROCEDURE — 36415 COLL VENOUS BLD VENIPUNCTURE: CPT | Performed by: INTERNAL MEDICINE

## 2025-05-28 PROCEDURE — 82374 ASSAY BLOOD CARBON DIOXIDE: CPT | Performed by: INTERNAL MEDICINE

## 2025-05-28 PROCEDURE — 2500000004 HC RX 250 GENERAL PHARMACY W/ HCPCS (ALT 636 FOR OP/ED): Mod: JZ | Performed by: INTERNAL MEDICINE

## 2025-05-28 PROCEDURE — 2500000001 HC RX 250 WO HCPCS SELF ADMINISTERED DRUGS (ALT 637 FOR MEDICARE OP): Performed by: INTERNAL MEDICINE

## 2025-05-28 PROCEDURE — 85520 HEPARIN ASSAY: CPT | Performed by: HOSPITALIST

## 2025-05-28 PROCEDURE — 70544 MR ANGIOGRAPHY HEAD W/O DYE: CPT | Performed by: RADIOLOGY

## 2025-05-28 PROCEDURE — 70547 MR ANGIOGRAPHY NECK W/O DYE: CPT | Performed by: RADIOLOGY

## 2025-05-28 PROCEDURE — 70551 MRI BRAIN STEM W/O DYE: CPT

## 2025-05-28 PROCEDURE — 2500000004 HC RX 250 GENERAL PHARMACY W/ HCPCS (ALT 636 FOR OP/ED): Mod: JZ | Performed by: NURSE PRACTITIONER

## 2025-05-28 PROCEDURE — 99223 1ST HOSP IP/OBS HIGH 75: CPT | Performed by: INTERNAL MEDICINE

## 2025-05-28 PROCEDURE — 99232 SBSQ HOSP IP/OBS MODERATE 35: CPT | Performed by: HOSPITALIST

## 2025-05-28 PROCEDURE — 2500000001 HC RX 250 WO HCPCS SELF ADMINISTERED DRUGS (ALT 637 FOR MEDICARE OP): Performed by: HOSPITALIST

## 2025-05-28 RX ORDER — FOLIC ACID 1 MG/1
1 TABLET ORAL DAILY
Status: DISCONTINUED | OUTPATIENT
Start: 2025-05-28 | End: 2025-05-30 | Stop reason: HOSPADM

## 2025-05-28 RX ORDER — METOPROLOL SUCCINATE 50 MG/1
50 TABLET, EXTENDED RELEASE ORAL DAILY
Status: DISCONTINUED | OUTPATIENT
Start: 2025-05-28 | End: 2025-05-30 | Stop reason: HOSPADM

## 2025-05-28 RX ORDER — MAGNESIUM SULFATE HEPTAHYDRATE 40 MG/ML
2 INJECTION, SOLUTION INTRAVENOUS ONCE
Status: COMPLETED | OUTPATIENT
Start: 2025-05-28 | End: 2025-05-28

## 2025-05-28 RX ORDER — LANOLIN ALCOHOL/MO/W.PET/CERES
100 CREAM (GRAM) TOPICAL DAILY
Status: DISCONTINUED | OUTPATIENT
Start: 2025-05-28 | End: 2025-05-30 | Stop reason: HOSPADM

## 2025-05-28 RX ORDER — PRAVASTATIN SODIUM 20 MG/1
20 TABLET ORAL NIGHTLY
Status: DISCONTINUED | OUTPATIENT
Start: 2025-05-28 | End: 2025-05-30 | Stop reason: HOSPADM

## 2025-05-28 RX ADMIN — MAGNESIUM SULFATE HEPTAHYDRATE 2 G: 40 INJECTION, SOLUTION INTRAVENOUS at 09:16

## 2025-05-28 RX ADMIN — METOPROLOL SUCCINATE 50 MG: 50 TABLET, EXTENDED RELEASE ORAL at 10:27

## 2025-05-28 RX ADMIN — FOLIC ACID 1 MG: 1 TABLET ORAL at 09:15

## 2025-05-28 RX ADMIN — Medication 100 MG: at 09:15

## 2025-05-28 RX ADMIN — PRAVASTATIN SODIUM 20 MG: 20 TABLET ORAL at 20:46

## 2025-05-28 RX ADMIN — HEPARIN SODIUM 1000 UNITS/HR: 10000 INJECTION, SOLUTION INTRAVENOUS at 07:04

## 2025-05-28 RX ADMIN — PANTOPRAZOLE SODIUM 40 MG: 40 TABLET, DELAYED RELEASE ORAL at 07:00

## 2025-05-28 SDOH — ECONOMIC STABILITY: FOOD INSECURITY: WITHIN THE PAST 12 MONTHS, YOU WORRIED THAT YOUR FOOD WOULD RUN OUT BEFORE YOU GOT THE MONEY TO BUY MORE.: NEVER TRUE

## 2025-05-28 SDOH — ECONOMIC STABILITY: HOUSING INSECURITY: AT ANY TIME IN THE PAST 12 MONTHS, WERE YOU HOMELESS OR LIVING IN A SHELTER (INCLUDING NOW)?: NO

## 2025-05-28 SDOH — ECONOMIC STABILITY: HOUSING INSECURITY: IN THE PAST 12 MONTHS, HOW MANY TIMES HAVE YOU MOVED WHERE YOU WERE LIVING?: 0

## 2025-05-28 SDOH — ECONOMIC STABILITY: FOOD INSECURITY: WITHIN THE PAST 12 MONTHS, THE FOOD YOU BOUGHT JUST DIDN'T LAST AND YOU DIDN'T HAVE MONEY TO GET MORE.: NEVER TRUE

## 2025-05-28 SDOH — ECONOMIC STABILITY: HOUSING INSECURITY: IN THE LAST 12 MONTHS, WAS THERE A TIME WHEN YOU WERE NOT ABLE TO PAY THE MORTGAGE OR RENT ON TIME?: NO

## 2025-05-28 SDOH — ECONOMIC STABILITY: TRANSPORTATION INSECURITY: IN THE PAST 12 MONTHS, HAS LACK OF TRANSPORTATION KEPT YOU FROM MEDICAL APPOINTMENTS OR FROM GETTING MEDICATIONS?: NO

## 2025-05-28 SDOH — SOCIAL STABILITY: SOCIAL INSECURITY: WITHIN THE LAST YEAR, HAVE YOU BEEN HUMILIATED OR EMOTIONALLY ABUSED IN OTHER WAYS BY YOUR PARTNER OR EX-PARTNER?: NO

## 2025-05-28 SDOH — ECONOMIC STABILITY: INCOME INSECURITY: IN THE PAST 12 MONTHS HAS THE ELECTRIC, GAS, OIL, OR WATER COMPANY THREATENED TO SHUT OFF SERVICES IN YOUR HOME?: NO

## 2025-05-28 SDOH — ECONOMIC STABILITY: FOOD INSECURITY: HOW HARD IS IT FOR YOU TO PAY FOR THE VERY BASICS LIKE FOOD, HOUSING, MEDICAL CARE, AND HEATING?: NOT VERY HARD

## 2025-05-28 SDOH — SOCIAL STABILITY: SOCIAL INSECURITY: WITHIN THE LAST YEAR, HAVE YOU BEEN AFRAID OF YOUR PARTNER OR EX-PARTNER?: NO

## 2025-05-28 ASSESSMENT — COGNITIVE AND FUNCTIONAL STATUS - GENERAL
MOBILITY SCORE: 24
DAILY ACTIVITIY SCORE: 24
MOBILITY SCORE: 24
MOBILITY SCORE: 24
DAILY ACTIVITIY SCORE: 24
PATIENT BASELINE BEDBOUND: NO
MOBILITY SCORE: 24
DAILY ACTIVITIY SCORE: 24

## 2025-05-28 ASSESSMENT — PAIN SCALES - GENERAL
PAINLEVEL_OUTOF10: 0 - NO PAIN
PAINLEVEL_OUTOF10: 0 - NO PAIN

## 2025-05-28 ASSESSMENT — ACTIVITIES OF DAILY LIVING (ADL)
LACK_OF_TRANSPORTATION: NO

## 2025-05-28 NOTE — CONSULTS
Inpatient consult to Cardiology  Consult performed by: Franko Cabrera DO  Consult ordered by: Celestina Mejia MD  Reason for consult: afib                                                          Date:   5/28/2025  Patient name:  Johann Casey  Date of admission:  5/27/2025  4:10 PM  MRN:   [id number]  YOB: 1950  Time of Consult:  10:11 AM    Consulting Cardiologist/LIMA: Dr. Franko Allan APRN, CNP  Primary Cardiologist:  Dr. Fred Badillo    Referring Provider: Dr. Blanco      Admission Diagnosis:     Atrial flutter with rapid ventricular response (Multi)      History of Present Illness:     74-year-old  male with past medical history of paroxysmal atrial fibrillation on anticoagulation, hypertension, CAD without stents, hyperlipidemia, aortic root aneurysm who presented to [facility] merge department yesterday after feeling episodes of diaphoresis, lightheadedness and almost fainting.  This happened twice while he was at work yesterday.  He left work and went home and his wife checked his blood pressure and it was 70s over 40s and he was in atrial fibrillation with heart rates of 150.  He denies any fever or chills, vomiting or diarrhea.  Denies any chest pain or increased shortness of breath.  There are some speculation about facial droop that happened a few days ago and he was confused briefly.  He does report that yesterday while driving to the hospital he felt that the lights were very bright even daytime running lights he described were much brighter than normal.  He admits to me that he ran out of his metoprolol for 5 days ago was having trouble refilling it.  He also reports that he is missed a few doses of Eliquis over the past few weeks.  He normally follows with Dr. Badillo for his cardiology care and underwent a cardioversion in August of last year.  He briefly saw Dr. Lopez from electrophysiology in October 2024.  During that time it  was discussed for possible ablation versus antiarrhythmic therapy.  The patient was feeling good at that time and was going to discuss options with his wife.  There was no follow-up appointment after that.  Chest x-ray in the emergency department showed no evidence of acute cardiopulmonary process.  CT of the head showed no CT evidence of acute intracranial abnormality.  Chronic changes including white matter disease commonly attributed to chronic microvascular ischemia and age-appropriate volume loss.  Initial lab work in the emergency department showed a glucose of 113, sodium 131, potassium 5.0, chloride 96, creatinine 2.23, GFR 30, , troponin 30.  Repeat troponin was 32.  Today's lab work looks to be much better with a creatinine 1.74.  No leukocytosis flu, RSV and COVID were all negative.  EKG in the emergency department showed atrial fibrillation with rapid ventricular response with a rate of 128.  Nonspecific ST and T wave abnormalities.  No STEMI criteria.  He was hydrated with IV fluids in the emergency department and giving IV metoprolol.  Due to his vision changes MRI was ordered and is pending.  His rate seems much better controlled today but still remains in atrial fibrillation.  General cardiology was consulted due to atrial fibrillation.      Previous Cardiac Testing:  I personally reviewed previous cardiac testing and agree with the current findings.    Echocardiogram:   Recent Labs     08/29/24  1325 08/23/24  1516   EF 55 45   LVIDD  --  4.99   Echocardiogram     Narrative  [address], [address], [place] [address]  Tel [phone] Fax [phone]    TRANSTHORACIC ECHOCARDIOGRAM REPORT      Patient Name:     NAE Herrera Physician:   [id number] Franko GONZALEZ  Study Date:       9/28/2023    Referring Physician: HUSSAIN WILCOX  MRN/PID:          [id number]     PCP:                 [id number] Bg Yanez MD  Accession/Order#: [id number]  Department Location: [facility]  YOB: 1950    Fellow:  Gender:           M            Nurse:  Admit Date:       9/28/2023    Sonographer:         Franko Sanches  Admission Status: Outpatient   Additional Staff:  Height:           177.80 cm    CC Report to:  Weight:           104.78 kg    Study Type:          Echocardiogram  BSA:              2.22 m2  Blood Pressure: 160 /80 mmHg    Diagnosis/ICD: I25.10-Atherosclerotic heart disease of native coronary artery  without angina pectoris; I10-Essential (primary) hypertension;  R93.1-Abnormal findings on diagnostic imaging of heart and  coronary circulation; R06.02-Shortness of breath  Indication:    CAD, HTN, CA Score <100, SOB  Procedure/CPT: Echo Complete w Full Doppler-[id number]    Patient History:  Pertinent History: CAD, HTN, Hyperlipidemia and Abn. EKG, CA Calcium score <100,  SoB, ETOH, CA, Vent. Arrhythmia.    Study Detail: The following Echo studies were performed: M-Mode, 2D, Doppler and  color flow. The patient was awake.      PHYSICIAN INTERPRETATION:  Left Ventricle: Left ventricular systolic function is normal, with an estimated ejection fraction of 55-60%. There are no regional wall motion abnormalities. The left ventricular cavity size is normal. There is mild concentric left ventricular hypertrophy. Spectral Doppler shows a normal pattern of left ventricular diastolic filling.  LV Wall Scoring:  All segments are normal.    Left Atrium: The left atrium is normal in size.  Right Ventricle: The right ventricle is mildly enlarged. There is normal right ventricular global systolic function.  Right Atrium: The right atrium is mildly dilated.  Aortic Valve: The aortic valve appears structurally normal. The aortic valve appears tricuspid. There is mild aortic valve cusp calcification. There is no evidence of aortic valve stenosis.  There is no evidence of aortic valve regurgitation. The peak instantaneous gradient of the  aortic valve is 5.4 mmHg. The mean gradient of the aortic valve is 3.0 mmHg.  Mitral Valve: The mitral valve is normal in structure. There is no evidence of mitral valve stenosis. The doppler estimated mean and peak diastolic pressure gradients are 1.0 mmHg and 2.6 mmHg respectively. There is normal mitral valve leaflet mobility. There is mild mitral annular calcification. There is mild mitral valve regurgitation.  Tricuspid Valve: The tricuspid valve is structurally normal. There is normal tricuspid valve leaflet mobility. There is mild tricuspid regurgitation.  Pulmonic Valve: The pulmonic valve is structurally normal. There is trace pulmonic valve regurgitation.  Pericardium: There is no pericardial effusion noted.  Aorta: The aortic root is normal. There is moderate dilatation of the ascending aorta.  Systemic Veins: The inferior vena cava appears to be of normal size.      CONCLUSIONS:  1. Left ventricular systolic function is normal with a 55-60% estimated ejection fraction.  2. There is no evidence of mitral valve stenosis.  3. Mild mitral valve regurgitation.  4. Mild tricuspid regurgitation is visualized.  5. Aortic valve stenosis is not present.  6. There is moderate dilatation of the ascending aorta.    QUANTITATIVE DATA SUMMARY:  2D MEASUREMENTS:  Normal Ranges:  Ao Root d:     3.10 cm    (2.0-3.7cm)  LAs:           3.10 cm    (2.7-4.0cm)  RVIDd:         2.80 cm    (0.9-3.6cm)  IVSd:          1.20 cm    (0.6-1.1cm)  LVPWd:         1.20 cm    (0.6-1.1cm)  LVIDd:         4.90 cm    (3.9-5.9cm)  LVIDs:         3.10 cm  LV Mass Index: 102.0 g/m2  LV % FS        36.7 %    LA VOLUME:  Normal Ranges:  LA Area A4C:     20.4 cm2  LA Area A2C:     26.4 cm2  LA Volume Index: 35.1 ml/m2  LA Vol A4C:      60.0 ml  LA Vol A2C:      86.0 ml  LA Vol BP:       78.0 ml    RA VOLUME BY A/L METHOD:  Normal Ranges:  RA Vol A4C:        88.2 ml    (8.3-19.5ml)  RA Vol Index A4C:  39.7 ml/m2  RA Area A4C:       24.1 cm2  RA  Major Axis A4C: 5.6 cm    AORTA MEASUREMENTS:  Normal Ranges:  Asc Ao, d: 3.90 cm (2.1-3.4cm)    LV SYSTOLIC FUNCTION BY 2D PLANIMETRY (MOD):  Normal Ranges:  EF-A4C View: 56.5 % (>=55%)  EF-A2C View: 56.2 %  EF-Biplane:  55.3 %    LV DIASTOLIC FUNCTION:  Normal Ranges:  MV Peak E:        0.63 m/s    (0.7-1.2 m/s)  MV Peak A:        0.47 m/s    (0.42-0.7 m/s)  E/A Ratio:        1.35        (1.0-2.2)  MV lateral e'     0.10 m/s  MV medial e'      0.05 m/s  MV A Dur:         254.00 msec  E/e' Ratio:       6.20        (<8.0)  PulmV Sys Geoffrey:    37.20 cm/s  PulmV Jacome Geoffrey:   53.70 cm/s  PulmV S/D Geoffrey:    0.70  PulmV A Revs Geoffrey: 26.00 cm/s  PulmV A Revs Dur: 141.00 msec    MITRAL VALVE:  Normal Ranges:  MV Vmax:    0.81 m/s (<=1.3m/s)  MV peak P.6 mmHg (<5mmHg)  MV mean P.0 mmHg (<48mmHg)  MV DT:      313 msec (150-240msec)    AORTIC VALVE:  Normal Ranges:  AoV Vmax:                1.16 m/s (<=1.7m/s)  AoV Peak P.4 mmHg (<20mmHg)  AoV Mean PG:             3.0 mmHg (1.7-11.5mmHg)  LVOT Max Geoffrey:            0.82 m/s (<=1.1m/s)  AoV VTI:                 27.80 cm (18-25cm)  LVOT VTI:                22.30 cm  LVOT Diameter:           2.00 cm  (1.8-2.4cm)  AoV Area, VTI:           2.52 cm2 (2.5-5.5cm2)  AoV Area,Vmax:           2.23 cm2 (2.5-4.5cm2)  AoV Dimensionless Index: 0.80      RIGHT VENTRICLE:  RV Basal 4.00 cm  RV Mid   2.80 cm  RV Major 6.9 cm    TRICUSPID VALVE/RVSP:  Normal Ranges:  Peak TR Velocity: 2.07 m/s  Est. RA Pressure: 3 mmHg  RV Syst Pressure: 20.1 mmHg (< 30mmHg)    PULMONIC VALVE:  Normal Ranges:  PV Accel Time: 95 msec  (>120ms)  PV Max Geoffrey:    0.8 m/s  (0.6-0.9m/s)  PV Max P.3 mmHg    Pulmonary Veins:  PulmV A Revs Dur: 141.00 msec  PulmV A Revs Geoffrey: 26.00 cm/s  PulmV Jacome Geoffrey:   53.70 cm/s  PulmV S/D Geoffrey:    0.70  PulmV Sys Geoffrey:    37.20 cm/s      [id number] Franko Cabrera DO  Electronically signed on 2023 at 4:35:30 PM      Wall Scoring    Nuclear stress test in  September 2023  IMPRESSION:  Normal Lexiscan Myoview cardiac perfusion stress test.  No evidence of ischemia or myocardial infarction by perfusion imaging.  Normal left ventricular systolic function, ejection fraction 55%.  Noninvasive risk stratification-low risk.  No previous myocardial perfusion scan available for comparison.          Allergies:     Allergies   Allergen Reactions    Amoxicillin Rash    Losartan Rash         Past Medical History:     Past Medical History:   Diagnosis Date    Arrhythmia     Cough 01/02/2018    Dermatitis medicamentosa 08/23/2024    Disorder of the skin and subcutaneous tissue, unspecified     Skin lesion    Generalized skin eruption due to drugs and medicaments taken internally     Drug rash    Headache 01/02/2018    Hyperlipidemia     Hypertension     Other specified abnormal findings of blood chemistry 02/25/2022    Abnormal LFTs    Other urticaria     Urticaria, acute    Peripheral edema 08/23/2024    Strain of muscle, fascia and tendon of other parts of biceps, unspecified arm, initial encounter 02/25/2022    Biceps muscle tear       Past Surgical History:     Past Surgical History:   Procedure Laterality Date    CARDIOVERSION  08/29/2024    CATARACT EXTRACTION, BILATERAL Left 01/25/2024    CATARACT EXTRACTION, BILATERAL Right 01/18/2024    OTHER SURGICAL HISTORY  02/08/2022    Skin biopsy    OTHER SURGICAL HISTORY  02/08/2022    Tonsillectomy    OTHER SURGICAL HISTORY  03/01/2022    Vasectomy       Family History:     Family History   Problem Relation Name Age of Onset    Hypertension Mother      Cancer Mother      Colon cancer Mother      Hypertension Father      Lung cancer Father      Cancer Father         Social History:     Social History     Tobacco Use    Smoking status: Never    Smokeless tobacco: Never   Vaping Use    Vaping status: Never Used   Substance Use Topics    Alcohol use: Not Currently     Comment: quit 8/29/2024    Drug use: Never       CURRENT INPATIENT  MEDICATIONS    Scheduled Medications[1]  Continuous Medications[2]  Current Outpatient Medications   Medication Instructions    albuterol (Ventolin HFA) 90 mcg/actuation inhaler 2 puffs, inhalation, Every 4 hours PRN    apixaban (ELIQUIS) 5 mg, oral, Every 12 hours    desloratadine (CLARINEX) 5 mg, oral, Daily    hydroCHLOROthiazide (MICROZIDE) 12.5 mg, oral, Daily    lisinopril 20 mg, oral, Daily    metoprolol succinate XL (TOPROL-XL) 50 mg, oral, Daily, Do not crush or chew.    pravastatin (PRAVACHOL) 20 mg, oral, Nightly        Review of Systems:      12 point review of systems was obtained in detail and is negative other than that detailed above.    Vital Signs:     Vitals:    05/27/25 2130 05/27/25 2203 05/28/25 0417 05/28/25 0730   BP: 100/74 117/77 123/84 112/74   BP Location:   Left arm Left arm   Patient Position:   Lying Lying   Pulse: 98 81 98 78   Resp: 20  18 18   Temp:  36.7 °C (98.1 °F) 36.8 °C (98.2 °F) 35.8 °C (96.4 °F)   TempSrc:   Temporal Temporal   SpO2: 99% 99% 96% 99%   Weight:       Height:           Intake/Output Summary (Last 24 hours) at 5/28/2025 1011  Last data filed at 5/28/2025 0417  Gross per 24 hour   Intake 500 ml   Output --   Net 500 ml       Wt Readings from Last 4 Encounters:   05/27/25 99.3 kg (219 lb)   04/22/25 99.3 kg (219 lb)   01/03/25 98.4 kg (217 lb)   12/24/24 97.8 kg (215 lb 9.8 oz)       Physical Examination:     Physical Exam  Vitals and nursing note reviewed.   HENT:      Head: Normocephalic.      Mouth/Throat:      Mouth: Mucous membranes are moist.   Eyes:      Pupils: Pupils are equal, round, and reactive to light.   Cardiovascular:      Rate and Rhythm: Normal rate. Rhythm irregular.      Pulses: Normal pulses.   Pulmonary:      Effort: Pulmonary effort is normal.   Abdominal:      General: Bowel sounds are normal.      Palpations: Abdomen is soft.   Musculoskeletal:         General: Normal range of motion.   Skin:     General: Skin is warm and dry.       Capillary Refill: Capillary refill takes less than 2 seconds.   Neurological:      Mental Status: He is alert and oriented to person, place, and time.   Psychiatric:         Mood and Affect: Mood normal.           Lab:     CBC:   Results from last 7 days   Lab Units 05/28/25  0324 05/27/25  1650   WBC AUTO x10*3/uL 5.4 10.3   RBC AUTO x10*6/uL 4.12* 4.39*   HEMOGLOBIN g/dL 14.3 15.1   HEMATOCRIT % 41.2 43.6   MCV fL 100 99   MCH pg 34.7* 34.4*   MCHC g/dL 34.7 34.6   RDW % 12.2 12.2   PLATELETS AUTO x10*3/uL 123* 199     CMP:    Results from last 7 days   Lab Units 05/28/25  0324 05/27/25  1650   SODIUM mmol/L 131* 131*   POTASSIUM mmol/L 4.5 5.0   CHLORIDE mmol/L 98 96*   CO2 mmol/L 24 26   BUN mg/dL 23 23   CREATININE mg/dL 1.74* 2.23*   GLUCOSE mg/dL 91 113*   PROTEIN TOTAL g/dL  --  6.9   CALCIUM mg/dL 9.3 9.8   BILIRUBIN TOTAL mg/dL  --  1.2   ALK PHOS U/L  --  43   AST U/L  --  16   ALT U/L  --  16     BMP:    Results from last 7 days   Lab Units 05/28/25  0324 05/27/25  1650   SODIUM mmol/L 131* 131*   POTASSIUM mmol/L 4.5 5.0   CHLORIDE mmol/L 98 96*   CO2 mmol/L 24 26   BUN mg/dL 23 23   CREATININE mg/dL 1.74* 2.23*   CALCIUM mg/dL 9.3 9.8   GLUCOSE mg/dL 91 113*     Magnesium:  Results from last 7 days   Lab Units 05/27/25  1650   MAGNESIUM mg/dL 1.88     Troponin:    Results from last 7 days   Lab Units 05/27/25  1805 05/27/25  1650   TROPHS ng/L 32* 30*     BNP:   Results from last 7 days   Lab Units 05/27/25  1650   BNP pg/mL 623*     Lipid Panel:         Diagnostic Studies:   @No results found for this or any previous visit.    Results for orders placed during the hospital encounter of 08/29/24    Transesophageal Echo (LILIANA) With Possible Cardioversion    Narrative  [place] [id number]  Tel [phone] Fax [phone]    TRANSESOPHAGEAL ECHOCARDIOGRAM REPORT    Patient Name:      NAE Herrera Physician:    [id number] Fred Badillo MD  Study Date:         8/29/2024            Ordering Provider:    [id number] BIBIANA BENNETT  DEDRA  MRN/PID:           [id number]             Fellow:  Accession#:        [id number]         Nurse:                Marlyn Stiles RN  Date of Birth/Age: 1950 / 73 years Sonographer:          Jeanette CHAMBERLAIN  Gender:            M                    Additional Staff:  Height:            180.34 cm            Admit Date:           8/29/2024  Weight:            99.79 kg             Admission Status:     Outpatient  BSA / BMI:         2.20 m2 / 30.68      Department Location:  [address] [place] [address]  kg/m2  Blood Pressure: 158 /78 mmHg    Study Type:    TRANSESOPHAGEAL ECHO (LILIANA) W/ POSSIBLE CARDIOVERSION  Diagnosis/ICD: Unspecified atrial fibrillation-I48.91  Indication:    A-FIB  CPT Codes:     LILIANA Complete-63638; Moderate Sedation Services initial 15 minutes  patient >5 years-63194  Study Detail: The following Echo studies were performed: 2D, Doppler and color  flow. Agitated saline used as a contrast agent for intraseptal  flow evaluation. The patient was sedated.      PHYSICIAN INTERPRETATION:  LILIANA Details: The LILIANA probe used was [id number]. Technically adequate omniplane transesophageal echocardiogram performed. Agitated saline contrast and color flow Doppler echo was performed to assess for the presence of a patent foramen ovale.  LILIANA Medication: The pharynx was anesthetized with Cetacaine spray, lidocaine ointment and viscous lidocaine. The patient was sedated using moderate sedation. Total intraservice time for moderate sedation was 100 minutes. Midazolam and Fentanyl 30mg was used to sedate the patient for this exam.  LILIANA Procedure: The probe was passed without difficulty. The following complication was encountered during the procedure: Patient tolerated the procedure well without any apparent complications.  LILIANA Cardioversion Procedure:  The patient was placed in a supine position and anterior-posterior defibrillation patches were applied. A biphasic  defibrillator was attached to the patient and set to synchronous mode. Sedation for the cardioversion was achieved using Propofol.  The patient has been anticoagulated with: Eliquis.  One synchronized biphasic external shock was delivered externally at 200 Joules in attempt to cardiovert the patient from atrial flutter. The procedure was successful, resulting in normal sinus rhythm.  Left Ventricle: The left ventricular systolic function is normal, with a visually estimated ejection fraction of 55%. There are no regional wall motion abnormalities. The left ventricular cavity size is normal. Spectral Doppler shows a normal pattern of left ventricular diastolic filling.  Left Atrium: The left atrium is mildly dilated. There is no definite left atrial thrombus present. A bubble study using agitated saline was performed. Bubble study is negative. The left atrial appendage is small. There is no definite left atrial mass present.  Right Ventricle: The right ventricle is normal in size. There is normal right ventricular global systolic function.  Right Atrium: The right atrium is normal in size.  Aortic Valve: The aortic valve appears structurally normal. There is no evidence of aortic valve regurgitation.  Mitral Valve: The mitral valve is normal in structure. There is no evidence of mitral valve regurgitation.  Tricuspid Valve: The tricuspid valve is structurally normal. No evidence of tricuspid regurgitation.  Pulmonic Valve: The pulmonic valve is structurally normal. There is no indication of pulmonic valve regurgitation.  Pericardium: There is no pericardial effusion noted.  Aorta: The aortic root is normal.      CONCLUSIONS:  1. The left ventricular systolic function is normal, with a visually estimated ejection fraction of 55%.  2. Successful direct current cardioversion for atrial flutter resulting in normal sinus rhythm.  3. There is normal right ventricular global systolic function.  4. No left atrial mass.  5.  No left atrial thrombus.    RECOMMENDATIONS:  Recommendations for this patient include anticoagulation.    QUANTITATIVE DATA SUMMARY:  LV SYSTOLIC FUNCTION BY 2D PLANIMETRY (MOD):  Normal Ranges:  EF-Visual:      55 %  LV EF Reported: 55 %      [id number] Fred Badillo MD  Electronically signed on 8/29/2024 at 4:12:24 PM        ** Final **          Radiology:     CT head wo IV contrast   Final Result   No CT evidence of acute intracranial abnormality.        Chronic senescent changes including white matter disease commonly   attributed to chronic microvascular ischemia and age-appropriate   volume loss.                  MACRO:   None        Signed by: Clinton Babcock 5/27/2025 5:47 PM   Dictation workstation:   [id number]      XR chest 1 view   Final Result   1.  No evidence of acute cardiopulmonary process.             Signed by: Apolinar Eisenberg 5/27/2025 5:37 PM   Dictation workstation:   [id number]      MR angio head wo IV contrast    (Results Pending)   MR angio neck wo IV contrast    (Results Pending)   MR brain wo IV contrast    (Results Pending)   Transesophageal Echo (LILIANA) With Possible Cardioversion    (Results Pending)       Problem List:     Patient Active Problem List   Diagnosis    Abnormal EKG    Abnormal urine cytology    Actinic keratosis    Other seborrheic keratosis    Agatston coronary artery calcium score less than 100    Alcohol abuse    Allergic rhinitis    Basal cell carcinoma of skin of other part of trunk    Benign essential hypertension    Benign keratosis of skin    Other melanin hyperpigmentation    Biceps muscle tear    Bowel dysfunction    DJD of right shoulder    Elevated PSA    Eosinophilia    Erectile dysfunction    Fatty liver    Hepatomegaly    Gingivitis    Hemangioma of skin and subcutaneous tissue    Hyperglycemia    Hyperlipidemia    Infection of mouth    Osteoarthritis of both knees    Lumbosacral radiculopathy at L1    Right knee DJD    Melanocytic nevi of trunk    Neoplasm of  uncertain behavior of skin    BMI 32.0-32.9,adult    PVC (premature ventricular contraction)    Shortness of breath    Ventricular arrhythmia    Melanocytic nevi of unspecified upper limb, including shoulder    Melanocytic nevi of lower limb, including hip    Bilateral leg edema    CAD (coronary artery disease)    Skin lesion    Aortic root aneurysm    Never smoked cigarettes    Amblyopia suspect, left eye    Asymmetry of optic nerve of both eyes    Dermatochalasis of both upper eyelids    Early dry stage nonexudative age-related macular degeneration of both eyes    Enlarged aorta    Pseudophakia    Regular astigmatism of left eye    Encounter for colorectal cancer screening    Other fatigue    Atrial flutter with rapid ventricular response (Multi)    Exertional dyspnea    HFrEF (heart failure with reduced ejection fraction)    Paroxysmal atrial fibrillation (Multi)    MAY (acute kidney injury)    Atrial fibrillation with RVR (Multi)    Near syncope    TIA (transient ischemic attack)    Noncompliance with medication regimen    Acute kidney injury superimposed on chronic kidney disease    Elevated troponin       Assessment:   74-year-old gentleman seen evaluate the bedside in the telemetry and in conjunction with Ammon ALCANTAR CNP.    Bedside examination evaluation performed by me.    Chart review details cussed the patient and staff.    Impression:  Paroxysmal atrial fibrillation with RVR  Chronic anticoagulation, has missed quite a few doses  Hypertension  Hyperlipidemia  MAY  TIA?      Plan:   Recommendation:  Admitted to medicine.  Remains on telemetry.  Telemetry shows rate controlled atrial fibrillation currently.  EKG from the emergency department shows atrial fibrillation with rapid ventricular response.  Nonspecific ST and T wave abnormality.  No STEMI criteria.  Supplemental O2, currently on room air good oxygen saturation.  Monitor electrolytes, keep potassium greater than 4 and magnesium greater than  2  Low suspicion for ACS.  Troponin elevation thought secondary to atrial fibrillation with RVR.  Denies any chest pain or increased shortness of breath.  EKGs are unchanged besides paroxysmal atrial fibrillation.  Nuclear stress test in September 2023 was unremarkable.  Restart Toprol-XL 50 mg daily  Continue anticoagulation with heparin infusion for now  Will plan for LILIANA with guided cardioversion tomorrow after MRI is complete with no findings such as an acute infarct.  Okay for cardiac diet today  N.p.o. at midnight.  Resume Eliquis tomorrow  Patient has seen Dr. Lopez in the past for suspected antiarrhythmic therapy versus ablation.  Referral was made to follow back with Dr. Lopez for possible ablation.  Will continue to follow along with you      Discussion:  Is a 74-year-old gentleman with a history of paroxysmal atrial fibrillation and prior cardioversion approximately 8 months ago.  Patient had run out of some of his medications and yesterday when he went to work he developed lightheadedness and dizziness.  He was eventually brought to the emergency room and found to be in atrial fibrillation.  He was admitted for further assessment.  Patient has followed in the past with Dr. Badillo.  He has never been identified as having significant coronary artery disease.  He does not have chest pain.  Is not out of breath.  He is feeling better today.  He still in A-fib with controlled rate.  Plans at this point are to continue medical therapy and resume beta-blockers and anticoagulation.  If necessary can consider LILIANA guided cardioversion.  Outpatient EP assessment and follow-up will occur with Dr. Lopez who is seen before.  We did discuss the options of watchman and ablation.  Follow-up outpatient testing might include repeat nuclear scan which in the past has been normal.  Will review any pending studies and report.  Will continue to follow.    Franko Cabrera,,[facility]      I have reviewed all medications, laboratory  results, and imaging pertinent for today's encounter     Ammon Allan [facility]  Adult Gerontology Acute Care Nurse Practitioner  [facility]   [facility]  [phone]      Of note, this documentation is completed using the Dragon Dictation system (voice recognition software). There may be spelling and/or grammatical errors that were not corrected prior to final submission.      Electronically signed by Franko Cabrera DO, on 5/28/2025 at 10:11 AM          [1] folic acid, 1 mg, oral, Daily  magnesium sulfate, 2 g, intravenous, Once  metoprolol succinate XL, 50 mg, oral, Daily  pantoprazole, 40 mg, oral, Daily before breakfast   Or  pantoprazole, 40 mg, intravenous, Daily before breakfast  thiamine, 100 mg, oral, Daily     [2] heparin, 0-4,000 Units/hr, Last Rate: 1,000 Units/hr (05/28/25 0704)

## 2025-05-28 NOTE — CARE PLAN
The patient's goals for the shift include      The clinical goals for the shift include Remain hemodynamically stable    Over the shift, the patient did not make progress toward the following goals. Barriers to progression include newly admitted, tests procedures. Recommendations to address these barriers include medications monitoring tests and procedures .

## 2025-05-28 NOTE — H&P
History Of Present Illness  Johann Casey is a 74 y.o. male presenting with near syncope episodes.  Patient reported he was at  work when he suddenly started to feel diaphoretic, lightheaded, about to faint.  This episode happened twice at work so his manager asked his  to drive him home so he can go to the hospital.  His wife at home checked his blood pressure was low in the 70s over 40s, and was in A-fib with heart rate was 150.  Patient denied fever, vomiting or diarrhea.  He also denied chest pain.    His wife also reported that few days ago he had sudden onset of Facial droop and was confused briefly.  Patient admits was not taking his Eliquis and metoprolol as prescribed.  He missed multiple doses of Eliquis over the last week.    ER course: Patient was awake, alert, oriented, no acute distress.  Patient was in A-fib with RVR with heart rate up to 122.  Blood pressure was low at 85/58.  Troponin was marginally elevated at 30 and 32.  Patient also has slight worsening of creatinine 2.2 from baseline 1.5.  There is no leukocytosis.  Other electrolytes were within acceptable range.  Flu, RSV and COVID test were all negative.  CT head was obtained due to concern of recent TIA was negative for acute intracranial pathology.  Chest x-ray shows no evidence of acute pathology either.  EKG was A-fib with RVR with heart rate 128, no evidence of ischemic changes.  Patient was given loading dose of aspirin in the ER.  Was also given IV fluid boluses 1 L normal saline.  Blood pressure stabilized after that.  Patient is admitted to medicine for further management    Past Medical History  He has a past medical history of Arrhythmia, Cough (01/02/2018), Dermatitis medicamentosa (08/23/2024), Disorder of the skin and subcutaneous tissue, unspecified, Generalized skin eruption due to drugs and medicaments taken internally, Headache (01/02/2018), Hyperlipidemia, Hypertension, Other specified abnormal findings of blood  chemistry (02/25/2022), Other urticaria, Peripheral edema (08/23/2024), and Strain of muscle, fascia and tendon of other parts of biceps, unspecified arm, initial encounter (02/25/2022).    Surgical History  He has a past surgical history that includes Other surgical history (02/08/2022); Other surgical history (02/08/2022); Other surgical history (03/01/2022); Cataract extraction, bilateral (Left, 01/25/2024); Cataract extraction, bilateral (Right, 01/18/2024); and Cardioversion (08/29/2024).     Social History  He reports that he has never smoked. He has never used smokeless tobacco. He reports that he does not currently use alcohol. He reports that he does not use drugs.    Family History  Family History[1]     Allergies  Amoxicillin and Losartan    Review of Systems  10/10 points review of system were conducted, negative except as above.    Physical Exam  Constitutional:       Appearance: Normal appearance. He is obese. He is not toxic-appearing or diaphoretic.   HENT:      Head: Normocephalic and atraumatic.      Nose: Nose normal.      Mouth/Throat:      Mouth: Mucous membranes are moist.   Eyes:      General: No scleral icterus.     Extraocular Movements: Extraocular movements intact.      Conjunctiva/sclera: Conjunctivae normal.      Pupils: Pupils are equal, round, and reactive to light.   Cardiovascular:      Rate and Rhythm: Tachycardia present. Rhythm irregular.      Pulses: Normal pulses.      Heart sounds: No murmur heard.     No friction rub. No gallop.   Pulmonary:      Effort: No respiratory distress.      Breath sounds: No stridor. No wheezing, rhonchi or rales.   Chest:      Chest wall: No tenderness.   Abdominal:      General: There is no distension.      Palpations: There is no mass.      Tenderness: There is no abdominal tenderness. There is no right CVA tenderness, left CVA tenderness, guarding or rebound.      Hernia: No hernia is present.   Musculoskeletal:         General: Normal range of  "motion.      Cervical back: Normal range of motion and neck supple. No rigidity or tenderness.      Right lower leg: No edema.      Left lower leg: No edema.   Skin:     General: Skin is warm and dry.      Findings: No lesion or rash.   Neurological:      General: No focal deficit present.      Mental Status: He is alert and oriented to person, place, and time. Mental status is at baseline.   Psychiatric:         Mood and Affect: Mood normal.         Behavior: Behavior normal.          Last Recorded Vitals  Blood pressure 100/74, pulse 98, temperature 36.9 °C (98.5 °F), temperature source Temporal, resp. rate 20, height 1.803 m (5' 11\"), weight 99.3 kg (219 lb), SpO2 99%.    Relevant Results  Scheduled medications  Scheduled Medications[2]  Continuous medications  Continuous Medications[3]  PRN medications  PRN Medications[4]       Results for orders placed or performed during the hospital encounter of 05/27/25 (from the past 24 hours)   CBC and Auto Differential   Result Value Ref Range    WBC 10.3 4.4 - 11.3 x10*3/uL    nRBC 0.0 0.0 - 0.0 /100 WBCs    RBC 4.39 (L) 4.50 - 5.90 x10*6/uL    Hemoglobin 15.1 13.5 - 17.5 g/dL    Hematocrit 43.6 41.0 - 52.0 %    MCV 99 80 - 100 fL    MCH 34.4 (H) 26.0 - 34.0 pg    MCHC 34.6 32.0 - 36.0 g/dL    RDW 12.2 11.5 - 14.5 %    Platelets 199 150 - 450 x10*3/uL    Neutrophils % 79.8 40.0 - 80.0 %    Immature Granulocytes %, Automated 0.3 0.0 - 0.9 %    Lymphocytes % 11.6 13.0 - 44.0 %    Monocytes % 8.0 2.0 - 10.0 %    Eosinophils % 0.2 0.0 - 6.0 %    Basophils % 0.1 0.0 - 2.0 %    Neutrophils Absolute 8.25 (H) 1.60 - 5.50 x10*3/uL    Immature Granulocytes Absolute, Automated 0.03 0.00 - 0.50 x10*3/uL    Lymphocytes Absolute 1.20 0.80 - 3.00 x10*3/uL    Monocytes Absolute 0.83 (H) 0.05 - 0.80 x10*3/uL    Eosinophils Absolute 0.02 0.00 - 0.40 x10*3/uL    Basophils Absolute 0.01 0.00 - 0.10 x10*3/uL   Comprehensive Metabolic Panel   Result Value Ref Range    Glucose 113 (H) 74 - 99 " mg/dL    Sodium 131 (L) 136 - 145 mmol/L    Potassium 5.0 3.5 - 5.3 mmol/L    Chloride 96 (L) 98 - 107 mmol/L    Bicarbonate 26 21 - 32 mmol/L    Anion Gap 14 10 - 20 mmol/L    Urea Nitrogen 23 6 - 23 mg/dL    Creatinine 2.23 (H) 0.50 - 1.30 mg/dL    eGFR 30 (L) >60 mL/min/1.73m*2    Calcium 9.8 8.6 - 10.3 mg/dL    Albumin 4.2 3.4 - 5.0 g/dL    Alkaline Phosphatase 43 33 - 136 U/L    Total Protein 6.9 6.4 - 8.2 g/dL    AST 16 9 - 39 U/L    Bilirubin, Total 1.2 0.0 - 1.2 mg/dL    ALT 16 10 - 52 U/L   Magnesium   Result Value Ref Range    Magnesium 1.88 1.60 - 2.40 mg/dL   Protime-INR   Result Value Ref Range    Protime 14.3 (H) 9.8 - 12.4 seconds    INR 1.3 (H) 0.9 - 1.1   aPTT   Result Value Ref Range    aPTT 30 26 - 36 seconds   Lactate   Result Value Ref Range    Lactate 2.0 0.4 - 2.0 mmol/L   B-Type Natriuretic Peptide   Result Value Ref Range     (H) 0 - 99 pg/mL   Troponin I, High Sensitivity, Initial   Result Value Ref Range    Troponin I, High Sensitivity 30 (H) 0 - 20 ng/L   Sars-CoV-2, Influenza A/B and RSV PCR   Result Value Ref Range    Coronavirus 2019, PCR Not Detected Not Detected    Flu A Result Not Detected Not Detected    Flu B Result Not Detected Not Detected    RSV PCR Not Detected Not Detected   Troponin, High Sensitivity, 1 Hour   Result Value Ref Range    Troponin I, High Sensitivity 32 (H) 0 - 20 ng/L         CT head wo IV contrast  Result Date: 5/27/2025  Interpreted By:  Clinton Babcock, STUDY: CT HEAD WO IV CONTRAST;  5/27/2025 5:15 pm   INDICATION: Signs/Symptoms:dizziness.     COMPARISON: 07/01/2019.   ACCESSION NUMBER(S): [id number]   ORDERING CLINICIAN: GERALD GARNICA   TECHNIQUE: Noncontrast axial CT scan of head was performed. Angled reformats in brain and bone windows were generated. The images were reviewed in bone, brain, blood and soft tissue windows.   FINDINGS: Calvarium: Calvarium is intact.   Paranasal sinuses and mastoids: Sclerosis of the right mastoid air cells  suggests sequela of chronic otomastoiditis. Visualized paranasal sinuses and left mastoids are clear.   Parenchyma/CSF Spaces: Grey-white differentiation is maintained. No mass effect or midline shift. No acute intracranial hemorrhage. No hydrocephalus. White matter is grossly unremarkable.           No CT evidence of acute intracranial abnormality.   Chronic senescent changes including white matter disease commonly attributed to chronic microvascular ischemia and age-appropriate volume loss.       MACRO: None   Signed by: Clinton Babcock 5/27/2025 5:47 PM Dictation workstation:   [id number]    XR chest 1 view  Result Date: 5/27/2025  Interpreted By:  Apolinar Eisenberg, STUDY: XR CHEST 1 VIEW;  5/27/2025 5:11 pm   INDICATION: Signs/Symptoms:dyspnea.   COMPARISON: Chest radiograph 12/24/2024   ACCESSION NUMBER(S): [id number]   ORDERING CLINICIAN: GERALD GARNICA   FINDINGS:     CARDIOMEDIASTINAL SILHOUETTE: Cardiomediastinal silhouette is unremarkable in size and configuration.   LUNGS: No consolidation, pneumothorax, or significant effusion.   ABDOMEN: No remarkable upper abdominal findings.   BONES: No acute osseous changes.       1.  No evidence of acute cardiopulmonary process.     Signed by: Apolinar Eisenberg 5/27/2025 5:37 PM Dictation workstation:   [id number]         Assessment/Plan   Assessment & Plan  Near syncope    TIA (transient ischemic attack)    HFrEF (heart failure with reduced ejection fraction)    Benign essential hypertension    Alcohol abuse    Atrial flutter with rapid ventricular response (Multi)    Noncompliance with medication regimen    Acute kidney injury superimposed on chronic kidney disease    Elevated troponin      Patient presented with near syncopal episode.  Was hypotensive and heart rate was 150 at home.    -Blood pressure improved with IV fluid in the ER.  - Also has MAY.  -No clear evidence of sepsis.  - Was not compliant with Eliquis and metoprolol.  - Will switch to heparin drip.   Has elevated troponin possible NSTEMI.  - Obtain echocardiogram.  - Cardiology consult for further recommendation.  - CT head reported negative for acute intracranial pathology.  - Not able to obtain CT angiogram of head and neck to evaluate for causes of TIA.  - Will obtain MRI of the brain and MRA of head and neck for further workup of TIA.  - Neurology consult.  - Keep n.p.o. after midnight in case cardiology will do intervention.  - Monitor serum electrolytes and correct as indicated.  - Start patient on thiamine and folic acid.  Alcohol abuse.  - Monitor for any alcohol withdrawal.  He reported never had withdrawal.  He drinks 2 beers a day but not every day.  On average 10 drinks per week.  - Gentle IV fluid hydration for MAY    Celestina Mejia MD         [1]   Family History  Problem Relation Name Age of Onset    Hypertension Mother      Cancer Mother      Colon cancer Mother      Hypertension Father      Lung cancer Father      Cancer Father     [2]    [3] heparin, 0-4,000 Units/hr, Last Rate: 1,200 Units/hr (05/27/25 2115)  lactated Ringer's, 75 mL/hr     [4] PRN medications: heparin, metoprolol

## 2025-05-28 NOTE — H&P (VIEW-ONLY)
Inpatient consult to Cardiology  Consult performed by: Franko Cabrera DO  Consult ordered by: Celestina Mejia MD  Reason for consult: afib                                                          Date:   5/28/2025  Patient name:  Johann Casey  Date of admission:  5/27/2025  4:10 PM  MRN:   [id number]  YOB: 1950  Time of Consult:  10:11 AM    Consulting Cardiologist/LIMA: Dr. Franko Allan APRN, CNP  Primary Cardiologist:  Dr. Fred Badillo    Referring Provider: Dr. Blanco      Admission Diagnosis:     Atrial flutter with rapid ventricular response (Multi)      History of Present Illness:     74-year-old  male with past medical history of paroxysmal atrial fibrillation on anticoagulation, hypertension, CAD without stents, hyperlipidemia, aortic root aneurysm who presented to [facility] merge department yesterday after feeling episodes of diaphoresis, lightheadedness and almost fainting.  This happened twice while he was at work yesterday.  He left work and went home and his wife checked his blood pressure and it was 70s over 40s and he was in atrial fibrillation with heart rates of 150.  He denies any fever or chills, vomiting or diarrhea.  Denies any chest pain or increased shortness of breath.  There are some speculation about facial droop that happened a few days ago and he was confused briefly.  He does report that yesterday while driving to the hospital he felt that the lights were very bright even daytime running lights he described were much brighter than normal.  He admits to me that he ran out of his metoprolol for 5 days ago was having trouble refilling it.  He also reports that he is missed a few doses of Eliquis over the past few weeks.  He normally follows with Dr. Badillo for his cardiology care and underwent a cardioversion in August of last year.  He briefly saw Dr. Lopez from electrophysiology in October 2024.  During that time it  was discussed for possible ablation versus antiarrhythmic therapy.  The patient was feeling good at that time and was going to discuss options with his wife.  There was no follow-up appointment after that.  Chest x-ray in the emergency department showed no evidence of acute cardiopulmonary process.  CT of the head showed no CT evidence of acute intracranial abnormality.  Chronic changes including white matter disease commonly attributed to chronic microvascular ischemia and age-appropriate volume loss.  Initial lab work in the emergency department showed a glucose of 113, sodium 131, potassium 5.0, chloride 96, creatinine 2.23, GFR 30, , troponin 30.  Repeat troponin was 32.  Today's lab work looks to be much better with a creatinine 1.74.  No leukocytosis flu, RSV and COVID were all negative.  EKG in the emergency department showed atrial fibrillation with rapid ventricular response with a rate of 128.  Nonspecific ST and T wave abnormalities.  No STEMI criteria.  He was hydrated with IV fluids in the emergency department and giving IV metoprolol.  Due to his vision changes MRI was ordered and is pending.  His rate seems much better controlled today but still remains in atrial fibrillation.  General cardiology was consulted due to atrial fibrillation.      Previous Cardiac Testing:  I personally reviewed previous cardiac testing and agree with the current findings.    Echocardiogram:   Recent Labs     08/29/24  1325 08/23/24  1516   EF 55 45   LVIDD  --  4.99   Echocardiogram     Narrative  [address], [address], [place] [address]  Tel [phone] Fax [phone]    TRANSTHORACIC ECHOCARDIOGRAM REPORT      Patient Name:     NAE Herrera Physician:   [id number] Franko GONZALEZ  Study Date:       9/28/2023    Referring Physician: HUSSAIN WILCOX  MRN/PID:          [id number]     PCP:                 [id number] Bg Yanez MD  Accession/Order#: [id number]  Department Location: [facility] Felisha Ayala  YOB: 1950    Fellow:  Gender:           M            Nurse:  Admit Date:       9/28/2023    Sonographer:         Franko Sanches  Admission Status: Outpatient   Additional Staff:  Height:           177.80 cm    CC Report to:  Weight:           104.78 kg    Study Type:          Echocardiogram  BSA:              2.22 m2  Blood Pressure: 160 /80 mmHg    Diagnosis/ICD: I25.10-Atherosclerotic heart disease of native coronary artery  without angina pectoris; I10-Essential (primary) hypertension;  R93.1-Abnormal findings on diagnostic imaging of heart and  coronary circulation; R06.02-Shortness of breath  Indication:    CAD, HTN, CA Score <100, SOB  Procedure/CPT: Echo Complete w Full Doppler-00466    Patient History:  Pertinent History: CAD, HTN, Hyperlipidemia and Abn. EKG, CA Calcium score <100,  SoB, ETOH, CA, Vent. Arrhythmia.    Study Detail: The following Echo studies were performed: M-Mode, 2D, Doppler and  color flow. The patient was awake.      PHYSICIAN INTERPRETATION:  Left Ventricle: Left ventricular systolic function is normal, with an estimated ejection fraction of 55-60%. There are no regional wall motion abnormalities. The left ventricular cavity size is normal. There is mild concentric left ventricular hypertrophy. Spectral Doppler shows a normal pattern of left ventricular diastolic filling.  LV Wall Scoring:  All segments are normal.    Left Atrium: The left atrium is normal in size.  Right Ventricle: The right ventricle is mildly enlarged. There is normal right ventricular global systolic function.  Right Atrium: The right atrium is mildly dilated.  Aortic Valve: The aortic valve appears structurally normal. The aortic valve appears tricuspid. There is mild aortic valve cusp calcification. There is no evidence of aortic valve stenosis.  There is no evidence of aortic valve regurgitation. The peak instantaneous gradient of the  aortic valve is 5.4 mmHg. The mean gradient of the aortic valve is 3.0 mmHg.  Mitral Valve: The mitral valve is normal in structure. There is no evidence of mitral valve stenosis. The doppler estimated mean and peak diastolic pressure gradients are 1.0 mmHg and 2.6 mmHg respectively. There is normal mitral valve leaflet mobility. There is mild mitral annular calcification. There is mild mitral valve regurgitation.  Tricuspid Valve: The tricuspid valve is structurally normal. There is normal tricuspid valve leaflet mobility. There is mild tricuspid regurgitation.  Pulmonic Valve: The pulmonic valve is structurally normal. There is trace pulmonic valve regurgitation.  Pericardium: There is no pericardial effusion noted.  Aorta: The aortic root is normal. There is moderate dilatation of the ascending aorta.  Systemic Veins: The inferior vena cava appears to be of normal size.      CONCLUSIONS:  1. Left ventricular systolic function is normal with a 55-60% estimated ejection fraction.  2. There is no evidence of mitral valve stenosis.  3. Mild mitral valve regurgitation.  4. Mild tricuspid regurgitation is visualized.  5. Aortic valve stenosis is not present.  6. There is moderate dilatation of the ascending aorta.    QUANTITATIVE DATA SUMMARY:  2D MEASUREMENTS:  Normal Ranges:  Ao Root d:     3.10 cm    (2.0-3.7cm)  LAs:           3.10 cm    (2.7-4.0cm)  RVIDd:         2.80 cm    (0.9-3.6cm)  IVSd:          1.20 cm    (0.6-1.1cm)  LVPWd:         1.20 cm    (0.6-1.1cm)  LVIDd:         4.90 cm    (3.9-5.9cm)  LVIDs:         3.10 cm  LV Mass Index: 102.0 g/m2  LV % FS        36.7 %    LA VOLUME:  Normal Ranges:  LA Area A4C:     20.4 cm2  LA Area A2C:     26.4 cm2  LA Volume Index: 35.1 ml/m2  LA Vol A4C:      60.0 ml  LA Vol A2C:      86.0 ml  LA Vol BP:       78.0 ml    RA VOLUME BY A/L METHOD:  Normal Ranges:  RA Vol A4C:        88.2 ml    (8.3-19.5ml)  RA Vol Index A4C:  39.7 ml/m2  RA Area A4C:       24.1 cm2  RA  Major Axis A4C: 5.6 cm    AORTA MEASUREMENTS:  Normal Ranges:  Asc Ao, d: 3.90 cm (2.1-3.4cm)    LV SYSTOLIC FUNCTION BY 2D PLANIMETRY (MOD):  Normal Ranges:  EF-A4C View: 56.5 % (>=55%)  EF-A2C View: 56.2 %  EF-Biplane:  55.3 %    LV DIASTOLIC FUNCTION:  Normal Ranges:  MV Peak E:        0.63 m/s    (0.7-1.2 m/s)  MV Peak A:        0.47 m/s    (0.42-0.7 m/s)  E/A Ratio:        1.35        (1.0-2.2)  MV lateral e'     0.10 m/s  MV medial e'      0.05 m/s  MV A Dur:         254.00 msec  E/e' Ratio:       6.20        (<8.0)  PulmV Sys Geoffrey:    37.20 cm/s  PulmV Jacome Geoffrey:   53.70 cm/s  PulmV S/D Geoffrey:    0.70  PulmV A Revs Geoffrey: 26.00 cm/s  PulmV A Revs Dur: 141.00 msec    MITRAL VALVE:  Normal Ranges:  MV Vmax:    0.81 m/s (<=1.3m/s)  MV peak P.6 mmHg (<5mmHg)  MV mean P.0 mmHg (<48mmHg)  MV DT:      313 msec (150-240msec)    AORTIC VALVE:  Normal Ranges:  AoV Vmax:                1.16 m/s (<=1.7m/s)  AoV Peak P.4 mmHg (<20mmHg)  AoV Mean PG:             3.0 mmHg (1.7-11.5mmHg)  LVOT Max Geoffrey:            0.82 m/s (<=1.1m/s)  AoV VTI:                 27.80 cm (18-25cm)  LVOT VTI:                22.30 cm  LVOT Diameter:           2.00 cm  (1.8-2.4cm)  AoV Area, VTI:           2.52 cm2 (2.5-5.5cm2)  AoV Area,Vmax:           2.23 cm2 (2.5-4.5cm2)  AoV Dimensionless Index: 0.80      RIGHT VENTRICLE:  RV Basal 4.00 cm  RV Mid   2.80 cm  RV Major 6.9 cm    TRICUSPID VALVE/RVSP:  Normal Ranges:  Peak TR Velocity: 2.07 m/s  Est. RA Pressure: 3 mmHg  RV Syst Pressure: 20.1 mmHg (< 30mmHg)    PULMONIC VALVE:  Normal Ranges:  PV Accel Time: 95 msec  (>120ms)  PV Max Geoffrey:    0.8 m/s  (0.6-0.9m/s)  PV Max P.3 mmHg    Pulmonary Veins:  PulmV A Revs Dur: 141.00 msec  PulmV A Revs Geoffrey: 26.00 cm/s  PulmV Jacome Geoffrey:   53.70 cm/s  PulmV S/D Geoffrey:    0.70  PulmV Sys Geoffrey:    37.20 cm/s      [id number] Franko Cabrera DO  Electronically signed on 2023 at 4:35:30 PM      Wall Scoring    Nuclear stress test in  September 2023  IMPRESSION:  Normal Lexiscan Myoview cardiac perfusion stress test.  No evidence of ischemia or myocardial infarction by perfusion imaging.  Normal left ventricular systolic function, ejection fraction 55%.  Noninvasive risk stratification-low risk.  No previous myocardial perfusion scan available for comparison.          Allergies:     Allergies   Allergen Reactions    Amoxicillin Rash    Losartan Rash         Past Medical History:     Past Medical History:   Diagnosis Date    Arrhythmia     Cough 01/02/2018    Dermatitis medicamentosa 08/23/2024    Disorder of the skin and subcutaneous tissue, unspecified     Skin lesion    Generalized skin eruption due to drugs and medicaments taken internally     Drug rash    Headache 01/02/2018    Hyperlipidemia     Hypertension     Other specified abnormal findings of blood chemistry 02/25/2022    Abnormal LFTs    Other urticaria     Urticaria, acute    Peripheral edema 08/23/2024    Strain of muscle, fascia and tendon of other parts of biceps, unspecified arm, initial encounter 02/25/2022    Biceps muscle tear       Past Surgical History:     Past Surgical History:   Procedure Laterality Date    CARDIOVERSION  08/29/2024    CATARACT EXTRACTION, BILATERAL Left 01/25/2024    CATARACT EXTRACTION, BILATERAL Right 01/18/2024    OTHER SURGICAL HISTORY  02/08/2022    Skin biopsy    OTHER SURGICAL HISTORY  02/08/2022    Tonsillectomy    OTHER SURGICAL HISTORY  03/01/2022    Vasectomy       Family History:     Family History   Problem Relation Name Age of Onset    Hypertension Mother      Cancer Mother      Colon cancer Mother      Hypertension Father      Lung cancer Father      Cancer Father         Social History:     Social History     Tobacco Use    Smoking status: Never    Smokeless tobacco: Never   Vaping Use    Vaping status: Never Used   Substance Use Topics    Alcohol use: Not Currently     Comment: quit 8/29/2024    Drug use: Never       CURRENT INPATIENT  MEDICATIONS    Scheduled Medications[1]  Continuous Medications[2]  Current Outpatient Medications   Medication Instructions    albuterol (Ventolin HFA) 90 mcg/actuation inhaler 2 puffs, inhalation, Every 4 hours PRN    apixaban (ELIQUIS) 5 mg, oral, Every 12 hours    desloratadine (CLARINEX) 5 mg, oral, Daily    hydroCHLOROthiazide (MICROZIDE) 12.5 mg, oral, Daily    lisinopril 20 mg, oral, Daily    metoprolol succinate XL (TOPROL-XL) 50 mg, oral, Daily, Do not crush or chew.    pravastatin (PRAVACHOL) 20 mg, oral, Nightly        Review of Systems:      12 point review of systems was obtained in detail and is negative other than that detailed above.    Vital Signs:     Vitals:    05/27/25 2130 05/27/25 2203 05/28/25 0417 05/28/25 0730   BP: 100/74 117/77 123/84 112/74   BP Location:   Left arm Left arm   Patient Position:   Lying Lying   Pulse: 98 81 98 78   Resp: 20  18 18   Temp:  36.7 °C (98.1 °F) 36.8 °C (98.2 °F) 35.8 °C (96.4 °F)   TempSrc:   Temporal Temporal   SpO2: 99% 99% 96% 99%   Weight:       Height:           Intake/Output Summary (Last 24 hours) at 5/28/2025 1011  Last data filed at 5/28/2025 0417  Gross per 24 hour   Intake 500 ml   Output --   Net 500 ml       Wt Readings from Last 4 Encounters:   05/27/25 99.3 kg (219 lb)   04/22/25 99.3 kg (219 lb)   01/03/25 98.4 kg (217 lb)   12/24/24 97.8 kg (215 lb 9.8 oz)       Physical Examination:     Physical Exam  Vitals and nursing note reviewed.   HENT:      Head: Normocephalic.      Mouth/Throat:      Mouth: Mucous membranes are moist.   Eyes:      Pupils: Pupils are equal, round, and reactive to light.   Cardiovascular:      Rate and Rhythm: Normal rate. Rhythm irregular.      Pulses: Normal pulses.   Pulmonary:      Effort: Pulmonary effort is normal.   Abdominal:      General: Bowel sounds are normal.      Palpations: Abdomen is soft.   Musculoskeletal:         General: Normal range of motion.   Skin:     General: Skin is warm and dry.       Capillary Refill: Capillary refill takes less than 2 seconds.   Neurological:      Mental Status: He is alert and oriented to person, place, and time.   Psychiatric:         Mood and Affect: Mood normal.           Lab:     CBC:   Results from last 7 days   Lab Units 05/28/25  0324 05/27/25  1650   WBC AUTO x10*3/uL 5.4 10.3   RBC AUTO x10*6/uL 4.12* 4.39*   HEMOGLOBIN g/dL 14.3 15.1   HEMATOCRIT % 41.2 43.6   MCV fL 100 99   MCH pg 34.7* 34.4*   MCHC g/dL 34.7 34.6   RDW % 12.2 12.2   PLATELETS AUTO x10*3/uL 123* 199     CMP:    Results from last 7 days   Lab Units 05/28/25  0324 05/27/25  1650   SODIUM mmol/L 131* 131*   POTASSIUM mmol/L 4.5 5.0   CHLORIDE mmol/L 98 96*   CO2 mmol/L 24 26   BUN mg/dL 23 23   CREATININE mg/dL 1.74* 2.23*   GLUCOSE mg/dL 91 113*   PROTEIN TOTAL g/dL  --  6.9   CALCIUM mg/dL 9.3 9.8   BILIRUBIN TOTAL mg/dL  --  1.2   ALK PHOS U/L  --  43   AST U/L  --  16   ALT U/L  --  16     BMP:    Results from last 7 days   Lab Units 05/28/25  0324 05/27/25  1650   SODIUM mmol/L 131* 131*   POTASSIUM mmol/L 4.5 5.0   CHLORIDE mmol/L 98 96*   CO2 mmol/L 24 26   BUN mg/dL 23 23   CREATININE mg/dL 1.74* 2.23*   CALCIUM mg/dL 9.3 9.8   GLUCOSE mg/dL 91 113*     Magnesium:  Results from last 7 days   Lab Units 05/27/25  1650   MAGNESIUM mg/dL 1.88     Troponin:    Results from last 7 days   Lab Units 05/27/25  1805 05/27/25  1650   TROPHS ng/L 32* 30*     BNP:   Results from last 7 days   Lab Units 05/27/25  1650   BNP pg/mL 623*     Lipid Panel:         Diagnostic Studies:   @No results found for this or any previous visit.    Results for orders placed during the hospital encounter of 08/29/24    Transesophageal Echo (LILIANA) With Possible Cardioversion    Narrative  [id number]  Tel [phone] Fax [phone]    TRANSESOPHAGEAL ECHOCARDIOGRAM REPORT    Patient Name:      NAE Herrera Physician:    [id number] Fred Badillo MD  Study Date:         8/29/2024            Ordering Provider:    [id number] BIBIANA BENNETT  DEDRA  MRN/PID:           [id number]             Fellow:  Accession#:        [id number]         Nurse:                Marlyn Stiles RN  Date of Birth/Age: 1950 / 73 years Sonographer:          Jeanette CHAMBERLAIN  Gender:            M                    Additional Staff:  Height:            180.34 cm            Admit Date:           8/29/2024  Weight:            99.79 kg             Admission Status:     Outpatient  BSA / BMI:         2.20 m2 / 30.68      Department Location:  [address] [place] [address]  kg/m2  Blood Pressure: 158 /78 mmHg    Study Type:    TRANSESOPHAGEAL ECHO (LILIANA) W/ POSSIBLE CARDIOVERSION  Diagnosis/ICD: Unspecified atrial fibrillation-I48.91  Indication:    A-FIB  CPT Codes:     LILIANA Complete-15537; Moderate Sedation Services initial 15 minutes  patient >5 years-18922  Study Detail: The following Echo studies were performed: 2D, Doppler and color  flow. Agitated saline used as a contrast agent for intraseptal  flow evaluation. The patient was sedated.      PHYSICIAN INTERPRETATION:  LILIANA Details: The LILIANA probe used was [id number]. Technically adequate omniplane transesophageal echocardiogram performed. Agitated saline contrast and color flow Doppler echo was performed to assess for the presence of a patent foramen ovale.  LILIANA Medication: The pharynx was anesthetized with Cetacaine spray, lidocaine ointment and viscous lidocaine. The patient was sedated using moderate sedation. Total intraservice time for moderate sedation was 100 minutes. Midazolam and Fentanyl 30mg was used to sedate the patient for this exam.  LILIANA Procedure: The probe was passed without difficulty. The following complication was encountered during the procedure: Patient tolerated the procedure well without any apparent complications.  LILIANA Cardioversion Procedure:  The patient was placed in a supine position and anterior-posterior defibrillation patches were applied. A biphasic  defibrillator was attached to the patient and set to synchronous mode. Sedation for the cardioversion was achieved using Propofol.  The patient has been anticoagulated with: Eliquis.  One synchronized biphasic external shock was delivered externally at 200 Joules in attempt to cardiovert the patient from atrial flutter. The procedure was successful, resulting in normal sinus rhythm.  Left Ventricle: The left ventricular systolic function is normal, with a visually estimated ejection fraction of 55%. There are no regional wall motion abnormalities. The left ventricular cavity size is normal. Spectral Doppler shows a normal pattern of left ventricular diastolic filling.  Left Atrium: The left atrium is mildly dilated. There is no definite left atrial thrombus present. A bubble study using agitated saline was performed. Bubble study is negative. The left atrial appendage is small. There is no definite left atrial mass present.  Right Ventricle: The right ventricle is normal in size. There is normal right ventricular global systolic function.  Right Atrium: The right atrium is normal in size.  Aortic Valve: The aortic valve appears structurally normal. There is no evidence of aortic valve regurgitation.  Mitral Valve: The mitral valve is normal in structure. There is no evidence of mitral valve regurgitation.  Tricuspid Valve: The tricuspid valve is structurally normal. No evidence of tricuspid regurgitation.  Pulmonic Valve: The pulmonic valve is structurally normal. There is no indication of pulmonic valve regurgitation.  Pericardium: There is no pericardial effusion noted.  Aorta: The aortic root is normal.      CONCLUSIONS:  1. The left ventricular systolic function is normal, with a visually estimated ejection fraction of 55%.  2. Successful direct current cardioversion for atrial flutter resulting in normal sinus rhythm.  3. There is normal right ventricular global systolic function.  4. No left atrial mass.  5.  No left atrial thrombus.    RECOMMENDATIONS:  Recommendations for this patient include anticoagulation.    QUANTITATIVE DATA SUMMARY:  LV SYSTOLIC FUNCTION BY 2D PLANIMETRY (MOD):  Normal Ranges:  EF-Visual:      55 %  LV EF Reported: 55 %      [id number] Fred Badillo MD  Electronically signed on 8/29/2024 at 4:12:24 PM        ** Final **          Radiology:     CT head wo IV contrast   Final Result   No CT evidence of acute intracranial abnormality.        Chronic senescent changes including white matter disease commonly   attributed to chronic microvascular ischemia and age-appropriate   volume loss.                  MACRO:   None        Signed by: Clinton Babcock 5/27/2025 5:47 PM   Dictation workstation:   [id number]      XR chest 1 view   Final Result   1.  No evidence of acute cardiopulmonary process.             Signed by: Apolinar Eisenberg 5/27/2025 5:37 PM   Dictation workstation:   [id number]      MR angio head wo IV contrast    (Results Pending)   MR angio neck wo IV contrast    (Results Pending)   MR brain wo IV contrast    (Results Pending)   Transesophageal Echo (LILIANA) With Possible Cardioversion    (Results Pending)       Problem List:     Patient Active Problem List   Diagnosis    Abnormal EKG    Abnormal urine cytology    Actinic keratosis    Other seborrheic keratosis    Agatston coronary artery calcium score less than 100    Alcohol abuse    Allergic rhinitis    Basal cell carcinoma of skin of other part of trunk    Benign essential hypertension    Benign keratosis of skin    Other melanin hyperpigmentation    Biceps muscle tear    Bowel dysfunction    DJD of right shoulder    Elevated PSA    Eosinophilia    Erectile dysfunction    Fatty liver    Hepatomegaly    Gingivitis    Hemangioma of skin and subcutaneous tissue    Hyperglycemia    Hyperlipidemia    Infection of mouth    Osteoarthritis of both knees    Lumbosacral radiculopathy at L1    Right knee DJD    Melanocytic nevi of trunk    Neoplasm of  uncertain behavior of skin    BMI 32.0-32.9,adult    PVC (premature ventricular contraction)    Shortness of breath    Ventricular arrhythmia    Melanocytic nevi of unspecified upper limb, including shoulder    Melanocytic nevi of lower limb, including hip    Bilateral leg edema    CAD (coronary artery disease)    Skin lesion    Aortic root aneurysm    Never smoked cigarettes    Amblyopia suspect, left eye    Asymmetry of optic nerve of both eyes    Dermatochalasis of both upper eyelids    Early dry stage nonexudative age-related macular degeneration of both eyes    Enlarged aorta    Pseudophakia    Regular astigmatism of left eye    Encounter for colorectal cancer screening    Other fatigue    Atrial flutter with rapid ventricular response (Multi)    Exertional dyspnea    HFrEF (heart failure with reduced ejection fraction)    Paroxysmal atrial fibrillation (Multi)    MAY (acute kidney injury)    Atrial fibrillation with RVR (Multi)    Near syncope    TIA (transient ischemic attack)    Noncompliance with medication regimen    Acute kidney injury superimposed on chronic kidney disease    Elevated troponin       Assessment:   74-year-old gentleman seen evaluate the bedside in the telemetry and in conjunction with Ammon ALCANTAR CNP.    Bedside examination evaluation performed by me.    Chart review details cussed the patient and staff.    Impression:  Paroxysmal atrial fibrillation with RVR  Chronic anticoagulation, has missed quite a few doses  Hypertension  Hyperlipidemia  MAY  TIA?      Plan:   Recommendation:  Admitted to medicine.  Remains on telemetry.  Telemetry shows rate controlled atrial fibrillation currently.  EKG from the emergency department shows atrial fibrillation with rapid ventricular response.  Nonspecific ST and T wave abnormality.  No STEMI criteria.  Supplemental O2, currently on room air good oxygen saturation.  Monitor electrolytes, keep potassium greater than 4 and magnesium greater than  2  Low suspicion for ACS.  Troponin elevation thought secondary to atrial fibrillation with RVR.  Denies any chest pain or increased shortness of breath.  EKGs are unchanged besides paroxysmal atrial fibrillation.  Nuclear stress test in September 2023 was unremarkable.  Restart Toprol-XL 50 mg daily  Continue anticoagulation with heparin infusion for now  Will plan for LILIANA with guided cardioversion tomorrow after MRI is complete with no findings such as an acute infarct.  Okay for cardiac diet today  N.p.o. at midnight.  Resume Eliquis tomorrow  Patient has seen Dr. Lopez in the past for suspected antiarrhythmic therapy versus ablation.  Referral was made to follow back with Dr. Lopez for possible ablation.  Will continue to follow along with you      Discussion:  Is a 74-year-old gentleman with a history of paroxysmal atrial fibrillation and prior cardioversion approximately 8 months ago.  Patient had run out of some of his medications and yesterday when he went to work he developed lightheadedness and dizziness.  He was eventually brought to the emergency room and found to be in atrial fibrillation.  He was admitted for further assessment.  Patient has followed in the past with Dr. Badillo.  He has never been identified as having significant coronary artery disease.  He does not have chest pain.  Is not out of breath.  He is feeling better today.  He still in A-fib with controlled rate.  Plans at this point are to continue medical therapy and resume beta-blockers and anticoagulation.  If necessary can consider LILIANA guided cardioversion.  Outpatient EP assessment and follow-up will occur with Dr. Lopez who is seen before.  We did discuss the options of watchman and ablation.  Follow-up outpatient testing might include repeat nuclear scan which in the past has been normal.  Will review any pending studies and report.  Will continue to follow.    Franko Cabrera,,[facility]      I have reviewed all medications, laboratory  results, and imaging pertinent for today's encounter     Ammon Allan [facility]  Adult Gerontology Acute Care Nurse Practitioner  [facility] [place]   [facility]  [phone]      Of note, this documentation is completed using the Dragon Dictation system (voice recognition software). There may be spelling and/or grammatical errors that were not corrected prior to final submission.      Electronically signed by Franko Cabrera DO, on 5/28/2025 at 10:11 AM          [1] folic acid, 1 mg, oral, Daily  magnesium sulfate, 2 g, intravenous, Once  metoprolol succinate XL, 50 mg, oral, Daily  pantoprazole, 40 mg, oral, Daily before breakfast   Or  pantoprazole, 40 mg, intravenous, Daily before breakfast  thiamine, 100 mg, oral, Daily     [2] heparin, 0-4,000 Units/hr, Last Rate: 1,000 Units/hr (05/28/25 0704)

## 2025-05-28 NOTE — PROGRESS NOTES
05/28/25 1646   Discharge Planning   Living Arrangements Spouse/significant other   Support Systems Spouse/significant other   Assistance Needed independent prior to admission   Type of Residence Private residence   Number of Stairs to Enter Residence 1   Number of Stairs Within Residence 0   Do you have animals or pets at home? Yes   Type of Animals or Pets 2 Dogs   Who is requesting discharge planning? Provider   Home or Post Acute Services None   Expected Discharge Disposition Home   Does the patient need discharge transport arranged? No   Financial Resource Strain   How hard is it for you to pay for the very basics like food, housing, medical care, and heating? Not very   Housing Stability   In the last 12 months, was there a time when you were not able to pay the mortgage or rent on time? N   At any time in the past 12 months, were you homeless or living in a shelter (including now)? N   Transportation Needs   In the past 12 months, has lack of transportation kept you from medical appointments or from getting medications? no   In the past 12 months, has lack of transportation kept you from meetings, work, or from getting things needed for daily living? No   Patient Choice   Patient / Family choosing to utilize agency / facility established prior to hospitalization No   Stroke Family Assessment   Stroke Family Assessment Needed No   Intensity of Service   Intensity of Service 0-30 min     Pt admitted from home ( resides with spouse) with dizziness , facial droop - symptoms resolved and afib with RVR.  Hx of PAF, cardiology consulted, heparin gtt. Plan is for MRI to R/O CVA and TIA.   Plan for LILIANA and Cardioversion tomorrow.    Met with patient to discuss dc planning needs. 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 Pt follows with PCP Dr Yanez at [facility].  Preferred pharmacy is [facility].     Pt currently declines any home going needs.  Agreeable to care transitions team following for case progression and if dc needs should arise.

## 2025-05-28 NOTE — PROGRESS NOTES
"Johann Casey is a 74 y.o. male on day 1 of admission presenting with afib with RVR and dizziness      Subjective   Feels better, no dizziness but does feel like he has palpitations        Objective     Last Recorded Vitals  /78 (BP Location: Left arm, Patient Position: Lying)   Pulse 69   Temp 36.9 °C (98.4 °F) (Temporal)   Resp 18   Ht 1.803 m (5' 11\")   Wt 99.3 kg (219 lb)   SpO2 96%   BMI 30.54 kg/m²      Intake/Output last 3 Shifts:    Intake/Output Summary (Last 24 hours) at 5/28/2025 1458  Last data filed at 5/28/2025 1300  Gross per 24 hour   Intake 1798.55 ml   Output --   Net 1798.55 ml       Scheduled medications  Scheduled Medications[1]  Continuous medications  Continuous Medications[2]  PRN medications  PRN Medications[3]    Physical Exam   Gen: NAD  HEENT: EOM, MMM  CV: irregular rhtym, no murmurs rubs or gallops  Resp: Clear to auscultation bilaterally  Abdomen: soft, NT,+BS  LE: No edema    Relevant Results  Lab Results   Component Value Date    WBC 5.4 05/28/2025    HGB 14.3 05/28/2025    HCT 41.2 05/28/2025     05/28/2025     (L) 05/28/2025     Lab Results   Component Value Date    GLUCOSE 91 05/28/2025    CALCIUM 9.3 05/28/2025     (L) 05/28/2025    K 4.5 05/28/2025    CO2 24 05/28/2025    CL 98 05/28/2025    BUN 23 05/28/2025    CREATININE 1.74 (H) 05/28/2025         Assessment/Plan  74 year old male admitted with dizziness and afib with RVR. Hx of PAF    -seen by cardiology, continue heparin gtt  -plan for yuriy cardioversion tomorrow  -continue home BB     Dizziness facial droop: symptoms resolved   -MRI done to rule out CVA and TIA     MAY vs CKD stage 3: monitor creatine was given IV fluids    HTN: hold ace and HTZ for now     DVT ppx: heparin gtt      Trey Blanco MD           [1] folic acid, 1 mg, oral, Daily  metoprolol succinate XL, 50 mg, oral, Daily  pantoprazole, 40 mg, oral, Daily before breakfast   Or  pantoprazole, 40 mg, intravenous, Daily before " breakfast  thiamine, 100 mg, oral, Daily  [2] heparin, 0-4,000 Units/hr, Last Rate: 800 Units/hr (05/28/25 1222)  [3] PRN medications: acetaminophen **OR** acetaminophen **OR** acetaminophen, heparin, melatonin, metoprolol, ondansetron **OR** ondansetron, polyethylene glycol

## 2025-05-29 ENCOUNTER — APPOINTMENT (OUTPATIENT)
Dept: CARDIOLOGY | Facility: HOSPITAL | Age: 75
DRG: 314 | End: 2025-05-29
Payer: MEDICARE

## 2025-05-29 ENCOUNTER — ANESTHESIA EVENT (OUTPATIENT)
Dept: CARDIOLOGY | Facility: HOSPITAL | Age: 75
End: 2025-05-29
Payer: MEDICARE

## 2025-05-29 ENCOUNTER — ANESTHESIA (OUTPATIENT)
Dept: CARDIOLOGY | Facility: HOSPITAL | Age: 75
End: 2025-05-29
Payer: MEDICARE

## 2025-05-29 LAB
ANION GAP SERPL CALC-SCNC: 10 MMOL/L (ref 10–20)
BUN SERPL-MCNC: 20 MG/DL (ref 6–23)
CALCIUM SERPL-MCNC: 8.7 MG/DL (ref 8.6–10.3)
CHLORIDE SERPL-SCNC: 100 MMOL/L (ref 98–107)
CHOLEST SERPL-MCNC: 122 MG/DL (ref 0–199)
CHOLESTEROL/HDL RATIO: 2.3
CO2 SERPL-SCNC: 24 MMOL/L (ref 21–32)
CREAT SERPL-MCNC: 1.22 MG/DL (ref 0.5–1.3)
EGFRCR SERPLBLD CKD-EPI 2021: 63 ML/MIN/1.73M*2
ERYTHROCYTE [DISTWIDTH] IN BLOOD BY AUTOMATED COUNT: 12.1 % (ref 11.5–14.5)
GLUCOSE SERPL-MCNC: 103 MG/DL (ref 74–99)
HCT VFR BLD AUTO: 39 % (ref 41–52)
HDLC SERPL-MCNC: 54.2 MG/DL
HGB BLD-MCNC: 13.5 G/DL (ref 13.5–17.5)
LDLC SERPL CALC-MCNC: 53 MG/DL
MAGNESIUM SERPL-MCNC: 2 MG/DL (ref 1.6–2.4)
MCH RBC QN AUTO: 34 PG (ref 26–34)
MCHC RBC AUTO-ENTMCNC: 34.6 G/DL (ref 32–36)
MCV RBC AUTO: 98 FL (ref 80–100)
NON HDL CHOLESTEROL: 68 MG/DL (ref 0–149)
NRBC BLD-RTO: 0 /100 WBCS (ref 0–0)
PLATELET # BLD AUTO: 118 X10*3/UL (ref 150–450)
POTASSIUM SERPL-SCNC: 4.4 MMOL/L (ref 3.5–5.3)
RBC # BLD AUTO: 3.97 X10*6/UL (ref 4.5–5.9)
SODIUM SERPL-SCNC: 130 MMOL/L (ref 136–145)
TRIGL SERPL-MCNC: 73 MG/DL (ref 0–149)
UFH PPP CHRO-ACNC: 0.6 IU/ML (ref ?–1.1)
VLDL: 15 MG/DL (ref 0–40)
WBC # BLD AUTO: 5.3 X10*3/UL (ref 4.4–11.3)

## 2025-05-29 PROCEDURE — 2500000001 HC RX 250 WO HCPCS SELF ADMINISTERED DRUGS (ALT 637 FOR MEDICARE OP): Performed by: NURSE PRACTITIONER

## 2025-05-29 PROCEDURE — 2060000001 HC INTERMEDIATE ICU ROOM DAILY

## 2025-05-29 PROCEDURE — 83735 ASSAY OF MAGNESIUM: CPT | Performed by: HOSPITALIST

## 2025-05-29 PROCEDURE — 93312 ECHO TRANSESOPHAGEAL: CPT | Performed by: INTERNAL MEDICINE

## 2025-05-29 PROCEDURE — 2500000004 HC RX 250 GENERAL PHARMACY W/ HCPCS (ALT 636 FOR OP/ED): Performed by: INTERNAL MEDICINE

## 2025-05-29 PROCEDURE — B24BZZ4 ULTRASONOGRAPHY OF HEART WITH AORTA, TRANSESOPHAGEAL: ICD-10-PCS | Performed by: INTERNAL MEDICINE

## 2025-05-29 PROCEDURE — 2500000001 HC RX 250 WO HCPCS SELF ADMINISTERED DRUGS (ALT 637 FOR MEDICARE OP): Performed by: INTERNAL MEDICINE

## 2025-05-29 PROCEDURE — 3700000002 HC GENERAL ANESTHESIA TIME - EACH INCREMENTAL 1 MINUTE

## 2025-05-29 PROCEDURE — 93005 ELECTROCARDIOGRAM TRACING: CPT

## 2025-05-29 PROCEDURE — 7100000009 HC PHASE TWO TIME - INITIAL BASE CHARGE

## 2025-05-29 PROCEDURE — 2500000005 HC RX 250 GENERAL PHARMACY W/O HCPCS: Performed by: INTERNAL MEDICINE

## 2025-05-29 PROCEDURE — 80061 LIPID PANEL: CPT | Performed by: INTERNAL MEDICINE

## 2025-05-29 PROCEDURE — 36415 COLL VENOUS BLD VENIPUNCTURE: CPT | Performed by: HOSPITALIST

## 2025-05-29 PROCEDURE — 92960 CARDIOVERSION ELECTRIC EXT: CPT | Performed by: INTERNAL MEDICINE

## 2025-05-29 PROCEDURE — 99233 SBSQ HOSP IP/OBS HIGH 50: CPT | Performed by: INTERNAL MEDICINE

## 2025-05-29 PROCEDURE — 3700000001 HC GENERAL ANESTHESIA TIME - INITIAL BASE CHARGE

## 2025-05-29 PROCEDURE — 2500000004 HC RX 250 GENERAL PHARMACY W/ HCPCS (ALT 636 FOR OP/ED): Mod: JZ | Performed by: HOSPITALIST

## 2025-05-29 PROCEDURE — 92960 CARDIOVERSION ELECTRIC EXT: CPT

## 2025-05-29 PROCEDURE — 99152 MOD SED SAME PHYS/QHP 5/>YRS: CPT

## 2025-05-29 PROCEDURE — 7100000001 HC RECOVERY ROOM TIME - INITIAL BASE CHARGE

## 2025-05-29 PROCEDURE — 85520 HEPARIN ASSAY: CPT | Performed by: NURSE PRACTITIONER

## 2025-05-29 PROCEDURE — 2500000004 HC RX 250 GENERAL PHARMACY W/ HCPCS (ALT 636 FOR OP/ED): Mod: JW

## 2025-05-29 PROCEDURE — 99153 MOD SED SAME PHYS/QHP EA: CPT

## 2025-05-29 PROCEDURE — 7100000002 HC RECOVERY ROOM TIME - EACH INCREMENTAL 1 MINUTE

## 2025-05-29 PROCEDURE — 99232 SBSQ HOSP IP/OBS MODERATE 35: CPT | Performed by: HOSPITALIST

## 2025-05-29 PROCEDURE — 85027 COMPLETE CBC AUTOMATED: CPT | Performed by: HOSPITALIST

## 2025-05-29 PROCEDURE — 7100000010 HC PHASE TWO TIME - EACH INCREMENTAL 1 MINUTE

## 2025-05-29 PROCEDURE — 5A2204Z RESTORATION OF CARDIAC RHYTHM, SINGLE: ICD-10-PCS | Performed by: INTERNAL MEDICINE

## 2025-05-29 PROCEDURE — 80048 BASIC METABOLIC PNL TOTAL CA: CPT | Performed by: HOSPITALIST

## 2025-05-29 PROCEDURE — 2500000001 HC RX 250 WO HCPCS SELF ADMINISTERED DRUGS (ALT 637 FOR MEDICARE OP): Performed by: HOSPITALIST

## 2025-05-29 RX ORDER — LIDOCAINE HYDROCHLORIDE 20 MG/ML
JELLY TOPICAL AS NEEDED
Status: DISCONTINUED | OUTPATIENT
Start: 2025-05-29 | End: 2025-05-29 | Stop reason: HOSPADM

## 2025-05-29 RX ORDER — MIDAZOLAM HYDROCHLORIDE 1 MG/ML
INJECTION, SOLUTION INTRAMUSCULAR; INTRAVENOUS AS NEEDED
Status: DISCONTINUED | OUTPATIENT
Start: 2025-05-29 | End: 2025-05-29 | Stop reason: HOSPADM

## 2025-05-29 RX ORDER — ASPIRIN 81 MG/1
81 TABLET ORAL DAILY
Status: DISCONTINUED | OUTPATIENT
Start: 2025-05-29 | End: 2025-05-29

## 2025-05-29 RX ORDER — PROPOFOL 10 MG/ML
INJECTION, EMULSION INTRAVENOUS AS NEEDED
Status: DISCONTINUED | OUTPATIENT
Start: 2025-05-29 | End: 2025-05-29

## 2025-05-29 RX ORDER — FENTANYL CITRATE 50 UG/ML
INJECTION, SOLUTION INTRAMUSCULAR; INTRAVENOUS AS NEEDED
Status: DISCONTINUED | OUTPATIENT
Start: 2025-05-29 | End: 2025-05-29 | Stop reason: HOSPADM

## 2025-05-29 RX ADMIN — APIXABAN 5 MG: 5 TABLET, FILM COATED ORAL at 08:28

## 2025-05-29 RX ADMIN — ASPIRIN 81 MG: 81 TABLET, COATED ORAL at 09:24

## 2025-05-29 RX ADMIN — METOPROLOL SUCCINATE 50 MG: 50 TABLET, EXTENDED RELEASE ORAL at 08:28

## 2025-05-29 RX ADMIN — MIDAZOLAM 2 MG: 1 INJECTION INTRAMUSCULAR; INTRAVENOUS at 12:11

## 2025-05-29 RX ADMIN — PRAVASTATIN SODIUM 20 MG: 20 TABLET ORAL at 20:04

## 2025-05-29 RX ADMIN — APIXABAN 5 MG: 5 TABLET, FILM COATED ORAL at 20:04

## 2025-05-29 RX ADMIN — PANTOPRAZOLE SODIUM 40 MG: 40 TABLET, DELAYED RELEASE ORAL at 06:32

## 2025-05-29 RX ADMIN — LIDOCAINE HYDROCHLORIDE 1 APPLICATION: 20 JELLY TOPICAL at 12:00

## 2025-05-29 RX ADMIN — Medication 100 MG: at 08:28

## 2025-05-29 RX ADMIN — BENZOCAINE, BUTAMBEN, AND TETRACAINE HYDROCHLORIDE 2 SPRAY: .028; .004; .004 AEROSOL, SPRAY TOPICAL at 12:00

## 2025-05-29 RX ADMIN — FOLIC ACID 1 MG: 1 TABLET ORAL at 08:28

## 2025-05-29 RX ADMIN — Medication 3 L/MIN: at 12:04

## 2025-05-29 RX ADMIN — HEPARIN SODIUM 800 UNITS/HR: 10000 INJECTION, SOLUTION INTRAVENOUS at 02:09

## 2025-05-29 RX ADMIN — FENTANYL CITRATE 50 MCG: 50 INJECTION, SOLUTION INTRAMUSCULAR; INTRAVENOUS at 12:11

## 2025-05-29 RX ADMIN — PROPOFOL 60 MG: 10 INJECTION, EMULSION INTRAVENOUS at 12:52

## 2025-05-29 ASSESSMENT — PAIN SCALES - GENERAL
PAINLEVEL_OUTOF10: 0 - NO PAIN

## 2025-05-29 ASSESSMENT — COGNITIVE AND FUNCTIONAL STATUS - GENERAL: MOBILITY SCORE: 24

## 2025-05-29 ASSESSMENT — ACTIVITIES OF DAILY LIVING (ADL): LACK_OF_TRANSPORTATION: NO

## 2025-05-29 ASSESSMENT — PAIN - FUNCTIONAL ASSESSMENT: PAIN_FUNCTIONAL_ASSESSMENT: 0-10

## 2025-05-29 NOTE — CARE PLAN
Attempted to see the patient. Re: TIA. He is off the floor for LILIANA w/possible cardioversion.

## 2025-05-29 NOTE — POST-PROCEDURE NOTE
Physician Transition of Care Summary  Invasive Cardiovascular Lab    Procedure Date: 05/29/25     Pre Procedure Indications:   Atrial fibrillation, precardioversion    Post Procedure Diagnosis:   Successful cardioversion of atrial fibrillation to normal sinus rhythm    Procedure(s):   Transesophageal echocardiogram and DC cardioversion    Procedure Findings:   Normal left ventricular systolic function with EF of 60 to 65%.  There are no regional wall motion abnormalities.  Normal-sized right ventricle with preserved right ventricular systolic function.  There is moderate left atrial enlargement with mild right atrial enlargement.  Saline bubble study as well as color-flow Doppler negative for PFO/ASD.  Normal-sized left atrial appendage with no obvious thrombus or masses visualized.  Trileaflet aortic valve with no aortic stenosis or regurgitation.  Structurally normal mitral valve with trace to mild mitral regurgitation.  Mildly thickened tricuspid valve with trace to mild tricuspid rotation.  Pulmonic valve is not well-visualized, but no pulmonic stenosis or regurgitation seen.  There is mild plaque in the descending thoracic aorta    Description of the Procedure:   After obtaining written informed consent, patient was brought to the Cath Lab in a fasting state.  Propofol was administered by anesthesiologist and paddles were placed in an AP fashion.  The patient subsequently had synchronized biphasic DC shock applied at 250 J with restoration of normal sinus rhythm.  The patient tolerated the procedure well with no complications.      Complications:   None    Estimated Blood Loss:   None    Anesthesia: Conscious sedation     any Specimen(s) Removed: None      Electronically signed by: Ike Chavira MD, 5/29/2025

## 2025-05-29 NOTE — ANESTHESIA POSTPROCEDURE EVALUATION
Patient: Johann Casey    Procedure Summary       Date: 05/29/25 Room / Location: [facility]    Anesthesia Start: 1250 Anesthesia Stop: 1300    Procedure: TRANSESOPHAGEAL ECHO (LILIANA) W/ POSSIBLE CARDIOVERSION Diagnosis:       Atrial fibrillation with RVR (Multi)      Dizziness      Near syncope      Paroxysmal A-fib (Multi)      (Evaluate for thrombus prior to cardioversion)    Scheduled Providers: Ike Chavira MD Responsible Provider: Real Sunshine MD    Anesthesia Type: MAC ASA Status: 3            Anesthesia Type: MAC    Vitals Value Taken Time   BP 92/63 05/29/25 13:06   Temp - 05/29/25 13:06   Pulse 58 05/29/25 13:06   Resp 12 05/29/25 13:06   SpO2 100 05/29/25 13:06       Anesthesia Post Evaluation    Patient location during evaluation: bedside  Patient participation: complete - patient participated  Level of consciousness: awake  Pain management: adequate  Multimodal analgesia pain management approach  Airway patency: patent  Cardiovascular status: acceptable  Respiratory status: acceptable  Hydration status: acceptable  Postoperative Nausea and Vomiting: none        No notable events documented.

## 2025-05-29 NOTE — CARE PLAN
The clinical goals for the shift include Pt will maintain Afib Rate <100 throughout shift.    Over the shift, the patient did make progress toward the following goals.

## 2025-05-29 NOTE — PROGRESS NOTES
"Johann Casey is a 74 y.o. male on day 2 of admission presenting with afib with RVR and dizziness      Subjective   Feels better       Objective     Last Recorded Vitals  /78   Pulse 75   Temp 37 °C (98.6 °F) (Temporal)   Resp 15   Ht 1.803 m (5' 11\")   Wt 99.3 kg (219 lb)   SpO2 100%   BMI 30.54 kg/m²      Intake/Output last 3 Shifts:    Intake/Output Summary (Last 24 hours) at 5/29/2025 1251  Last data filed at 5/29/2025 0600  Gross per 24 hour   Intake 745.7 ml   Output 1 ml   Net 744.7 ml       Scheduled medications  Scheduled Medications[1]  Continuous medications  Continuous Medications[2]  PRN medications  PRN Medications[3]    Physical Exam   Gen: NAD  HEENT: EOM, MMM  CV: irregular rhtym, no murmurs rubs or gallops  Resp: Clear to auscultation bilaterally  Abdomen: soft, NT,+BS  LE: No edema    Relevant Results  Lab Results   Component Value Date    WBC 5.3 05/29/2025    HGB 13.5 05/29/2025    HCT 39.0 (L) 05/29/2025    MCV 98 05/29/2025     (L) 05/29/2025     Lab Results   Component Value Date    GLUCOSE 103 (H) 05/29/2025    CALCIUM 8.7 05/29/2025     (L) 05/29/2025    K 4.4 05/29/2025    CO2 24 05/29/2025     05/29/2025    BUN 20 05/29/2025    CREATININE 1.22 05/29/2025         Assessment/Plan  74 year old male admitted with dizziness and afib with RVR. Hx of PAF    -seen by cardiology, on eliquis   -plan for yuriy cardioversion today   -continue home BB   -alcohol cessation advised     Dizziness facial droop: symptoms resolved   -MRI done to rule showed old lacunar stroke  -neurology see, continue eliquis and statin for now     MAY vs CKD stage 3: monitor creatine was given IV fluids, improving     HTN: hold ace and HTZ for now     DVT ppx: on eliquis       Trey Blanco MD           [1] apixaban, 5 mg, oral, q12h  folic acid, 1 mg, oral, Daily  metoprolol succinate XL, 50 mg, oral, Daily  pantoprazole, 40 mg, oral, Daily before breakfast   Or  pantoprazole, 40 mg, " intravenous, Daily before breakfast  pravastatin, 20 mg, oral, Nightly  thiamine, 100 mg, oral, Daily     [2] oxygen, , Last Rate: 3 L/min (05/29/25 1204)     [3] PRN medications: acetaminophen **OR** acetaminophen **OR** acetaminophen, butamben-tetracaine-benzocaine, fentaNYL PF, lidocaine, melatonin, metoprolol, midazolam, ondansetron **OR** ondansetron, oxygen, polyethylene glycol

## 2025-05-29 NOTE — PROGRESS NOTES
5/29 patient will have LILIANA and Cardioversion.Pending that outcome plan is home and independent.

## 2025-05-29 NOTE — ANESTHESIA PREPROCEDURE EVALUATION
Patient: Johann Casey    Procedure Information       Date/Time: 05/29/25 1100    Scheduled providers: Ike Chavira MD    Procedure: TRANSESOPHAGEAL ECHO (LILIANA) W/ POSSIBLE CARDIOVERSION    Location: [facility]            Relevant Problems   Cardiac   (+) Abnormal EKG   (+) Atrial fibrillation with RVR (Multi)   (+) Atrial flutter with rapid ventricular response (Multi)   (+) Benign essential hypertension   (+) CAD (coronary artery disease)   (+) Hyperlipidemia   (+) PVC (premature ventricular contraction)   (+) Paroxysmal atrial fibrillation (Multi)   (+) Ventricular arrhythmia      Pulmonary   (+) Exertional dyspnea      Neuro   (+) Lumbosacral radiculopathy at L1   (+) TIA (transient ischemic attack)      Liver   (+) Fatty liver   (+) Hepatomegaly      Musculoskeletal   (+) DJD of right shoulder   (+) Osteoarthritis of both knees   (+) Right knee DJD      ID   (+) Infection of mouth      Skin   (+) Basal cell carcinoma of skin of other part of trunk       Clinical information reviewed:    Allergies  Meds  Problems              NPO Detail:  No data recorded     Physical Exam    Airway  Mallampati: II     Cardiovascular - normal exam   Dental - normal exam     Pulmonary - normal exam   Abdominal (+) obese  Abdomen: soft             Anesthesia Plan    History of general anesthesia?: yes  History of complications of general anesthesia?: no    ASA 3     MAC     intravenous induction   Anesthetic plan and risks discussed with patient.    Plan discussed with CRNA.

## 2025-05-29 NOTE — PROGRESS NOTES
Rounding [place]/Cardiologist:  Franko Cabrera DO, Dr. Franko Cabrera  Primary Cardiologist: Dr. Fred Badillo    Date:  5/29/2025  Patient:  Johann Casey  YOB: 1950  MRN:  [id number]   Admit Date:  5/27/2025      SUBJECTIVE:    Johann Casey was seen and examined today at bedside.     He denies any chest pain or shortness of breath.     Remains in rate controlled atrial fibrillation        VITALS:     Vitals:    05/28/25 1926 05/28/25 2323 05/29/25 0353 05/29/25 0713   BP: 124/74 128/88 117/76 114/70   BP Location: Left arm Left arm Left arm    Patient Position: Sitting Sitting Lying    Pulse: 84 72 74 75   Resp: 18 18 18 18   Temp: 36.7 °C (98.1 °F) 36.7 °C (98.1 °F) 37 °C (98.6 °F) 36.8 °C (98.2 °F)   TempSrc: Temporal Temporal Temporal    SpO2: 98% 99% 96% 96%   Weight:       Height:           Intake/Output Summary (Last 24 hours) at 5/29/2025 1006  Last data filed at 5/29/2025 0600  Gross per 24 hour   Intake 1804.25 ml   Output 1 ml   Net 1803.25 ml       Wt Readings from Last 4 Encounters:   05/27/25 99.3 kg (219 lb)   04/22/25 99.3 kg (219 lb)   01/03/25 98.4 kg (217 lb)   12/24/24 97.8 kg (215 lb 9.8 oz)       CURRENT HOSPITAL MEDICATIONS:   Scheduled Medications[1]  Continuous Medications[2]  Current Outpatient Medications   Medication Instructions    albuterol (Ventolin HFA) 90 mcg/actuation inhaler 2 puffs, inhalation, Every 4 hours PRN    apixaban (ELIQUIS) 5 mg, oral, Every 12 hours    desloratadine (CLARINEX) 5 mg, oral, Daily    hydroCHLOROthiazide (MICROZIDE) 12.5 mg, oral, Daily    lisinopril 20 mg, oral, Daily    metoprolol succinate XL (TOPROL-XL) 50 mg, oral, Daily, Do not crush or chew.    pravastatin (PRAVACHOL) 20 mg, oral, Nightly        PHYSICAL EXAMINATION:     Physical Exam  Vitals and nursing note reviewed.   HENT:      Head: Normocephalic.      Mouth/Throat:      Mouth: Mucous membranes are moist.   Eyes:      Pupils: Pupils are equal, round, and  reactive to light.   Cardiovascular:      Rate and Rhythm: Normal rate. Rhythm irregular.      Pulses: Normal pulses.   Pulmonary:      Effort: Pulmonary effort is normal.   Abdominal:      General: Bowel sounds are normal.      Palpations: Abdomen is soft.   Musculoskeletal:         General: Normal range of motion.   Skin:     General: Skin is warm and dry.      Capillary Refill: Capillary refill takes less than 2 seconds.   Neurological:      Mental Status: He is alert and oriented to person, place, and time.   Psychiatric:         Mood and Affect: Mood normal.         LAB DATA:     CBC:   Results from last 7 days   Lab Units 05/29/25 0527 05/28/25 0324 05/27/25  1650   WBC AUTO x10*3/uL 5.3 5.4 10.3   RBC AUTO x10*6/uL 3.97* 4.12* 4.39*   HEMOGLOBIN g/dL 13.5 14.3 15.1   HEMATOCRIT % 39.0* 41.2 43.6   MCV fL 98 100 99   MCH pg 34.0 34.7* 34.4*   MCHC g/dL 34.6 34.7 34.6   RDW % 12.1 12.2 12.2   PLATELETS AUTO x10*3/uL 118* 123* 199     CMP:    Results from last 7 days   Lab Units 05/29/25 0527 05/28/25 0324 05/27/25  1650   SODIUM mmol/L 130* 131* 131*   POTASSIUM mmol/L 4.4 4.5 5.0   CHLORIDE mmol/L 100 98 96*   CO2 mmol/L 24 24 26   BUN mg/dL 20 23 23   CREATININE mg/dL 1.22 1.74* 2.23*   GLUCOSE mg/dL 103* 91 113*   PROTEIN TOTAL g/dL  --   --  6.9   CALCIUM mg/dL 8.7 9.3 9.8   BILIRUBIN TOTAL mg/dL  --   --  1.2   ALK PHOS U/L  --   --  43   AST U/L  --   --  16   ALT U/L  --   --  16     BMP:    Results from last 7 days   Lab Units 05/29/25 0527 05/28/25 0324 05/27/25  1650   SODIUM mmol/L 130* 131* 131*   POTASSIUM mmol/L 4.4 4.5 5.0   CHLORIDE mmol/L 100 98 96*   CO2 mmol/L 24 24 26   BUN mg/dL 20 23 23   CREATININE mg/dL 1.22 1.74* 2.23*   CALCIUM mg/dL 8.7 9.3 9.8   GLUCOSE mg/dL 103* 91 113*     Magnesium:  Results from last 7 days   Lab Units 05/29/25  0527 05/27/25  1650   MAGNESIUM mg/dL 2.00 1.88     Troponin:    Results from last 7 days   Lab Units 05/27/25  1805 05/27/25  1650   TROPHS ng/L  32* 30*     BNP:   Results from last 7 days   Lab Units 05/27/25  1650   BNP pg/mL 623*     Lipid Panel:  Results from last 7 days   Lab Units 05/29/25  0527   HDL mg/dL 54.2   CHOLESTEROL/HDL RATIO  2.3   VLDL mg/dL 15   TRIGLYCERIDES mg/dL 73   NON HDL CHOL. mg/dL 68        DIAGNOSTIC TESTING:   @No results found for this or any previous visit.    Echocardiogram: Results for orders placed during the hospital encounter of 08/29/24    Transesophageal Echo (LILIANA) With Possible Cardioversion    [place] [id number]  Tel [phone] Fax [phone]    TRANSESOPHAGEAL ECHOCARDIOGRAM REPORT    Patient Name:      NAE Herrera Physician:    [id number] Fred Badillo MD  Study Date:        8/29/2024            Ordering Provider:    [id number] BIBIANA COLMENARES  MRN/PID:           [id number]             Fellow:  Accession#:        [id number]         Nurse:                Marlyn Stiles RN  Date of Birth/Age: 1950 / 73 years Sonographer:          Jeanette CHAMBERLAIN  Gender:            M                    Additional Staff:  Height:            180.34 cm            Admit Date:           8/29/2024  Weight:            99.79 kg             Admission Status:     Outpatient  BSA / BMI:         2.20 m2 / 30.68      Department Location:  [address] [place] [address]  kg/m2  Blood Pressure: 158 /78 mmHg    Study Type:    TRANSESOPHAGEAL ECHO (LILIANA) W/ POSSIBLE CARDIOVERSION  Diagnosis/ICD: Unspecified atrial fibrillation-I48.91  Indication:    A-FIB  CPT Codes:     LILIANA Complete-26681; Moderate Sedation Services initial 15 minutes  patient >5 years-51013  Study Detail: The following Echo studies were performed: 2D, Doppler and color  flow. Agitated saline used as a contrast agent for intraseptal  flow evaluation. The patient was sedated.      PHYSICIAN INTERPRETATION:  LILIANA Details: The LILIANA probe used was [id number]. Technically adequate omniplane transesophageal echocardiogram performed. Agitated  saline contrast and color flow Doppler echo was performed to assess for the presence of a patent foramen ovale.  LILIANA Medication: The pharynx was anesthetized with Cetacaine spray, lidocaine ointment and viscous lidocaine. The patient was sedated using moderate sedation. Total intraservice time for moderate sedation was 100 minutes. Midazolam and Fentanyl 30mg was used to sedate the patient for this exam.  LILIANA Procedure: The probe was passed without difficulty. The following complication was encountered during the procedure: Patient tolerated the procedure well without any apparent complications.  LILIANA Cardioversion Procedure:  The patient was placed in a supine position and anterior-posterior defibrillation patches were applied. A biphasic defibrillator was attached to the patient and set to synchronous mode. Sedation for the cardioversion was achieved using Propofol.  The patient has been anticoagulated with: Eliquis.  One synchronized biphasic external shock was delivered externally at 200 Joules in attempt to cardiovert the patient from atrial flutter. The procedure was successful, resulting in normal sinus rhythm.  Left Ventricle: The left ventricular systolic function is normal, with a visually estimated ejection fraction of 55%. There are no regional wall motion abnormalities. The left ventricular cavity size is normal. Spectral Doppler shows a normal pattern of left ventricular diastolic filling.  Left Atrium: The left atrium is mildly dilated. There is no definite left atrial thrombus present. A bubble study using agitated saline was performed. Bubble study is negative. The left atrial appendage is small. There is no definite left atrial mass present.  Right Ventricle: The right ventricle is normal in size. There is normal right ventricular global systolic function.  Right Atrium: The right atrium is normal in size.  Aortic Valve: The aortic valve appears structurally normal. There is no evidence of aortic  valve regurgitation.  Mitral Valve: The mitral valve is normal in structure. There is no evidence of mitral valve regurgitation.  Tricuspid Valve: The tricuspid valve is structurally normal. No evidence of tricuspid regurgitation.  Pulmonic Valve: The pulmonic valve is structurally normal. There is no indication of pulmonic valve regurgitation.  Pericardium: There is no pericardial effusion noted.  Aorta: The aortic root is normal.      CONCLUSIONS:  1. The left ventricular systolic function is normal, with a visually estimated ejection fraction of 55%.  2. Successful direct current cardioversion for atrial flutter resulting in normal sinus rhythm.  3. There is normal right ventricular global systolic function.  4. No left atrial mass.  5. No left atrial thrombus.    RECOMMENDATIONS:  Recommendations for this patient include anticoagulation.    QUANTITATIVE DATA SUMMARY:  LV SYSTOLIC FUNCTION BY 2D PLANIMETRY (MOD):  Normal Ranges:  EF-Visual:      55 %  LV EF Reported: 55 %      [id number] Fred Badillo MD  Electronically signed on 8/29/2024 at 4:12:24 PM        ** Final **      Coronary Angiography: No results found for this or any previous visit from the past 1800 days.            RADIOLOGY:     MR angio head wo IV contrast   Final Result   1. There is no large vessel occlusion, significant flow limiting   stenosis, or aneurysm.        MACRO:   None        Signed by: Johann West 5/28/2025 3:22 PM   Dictation workstation:   [id number]      MR angio neck wo IV contrast   Final Result   1. There is no evidence of significant flow-limiting stenosis.        MACRO:   None        Signed by: Johann West 5/28/2025 3:23 PM   Dictation workstation:   [id number]      MR brain wo IV contrast   Final Result   1. Negative brain MRI examination for acute change.   2. A completed lacunar type defect is present within the left centrum   semiovale.   3. There is a background of age-related volume loss and calcified    atherosclerotic disease.        MACRO:   None        Signed by: Johann West 5/28/2025 3:20 PM   Dictation workstation:   [id number]      CT head wo IV contrast   Final Result   No CT evidence of acute intracranial abnormality.        Chronic senescent changes including white matter disease commonly   attributed to chronic microvascular ischemia and age-appropriate   volume loss.                  MACRO:   None        Signed by: Clinton Babcock 5/27/2025 5:47 PM   Dictation workstation:   [id number]      XR chest 1 view   Final Result   1.  No evidence of acute cardiopulmonary process.             Signed by: Apolinar Eisenberg 5/27/2025 5:37 PM   Dictation workstation:   [id number]      Transesophageal Echo (LILIANA) With Possible Cardioversion    (Results Pending)       PROBLEM LIST     Patient Active Problem List   Diagnosis    Abnormal EKG    Abnormal urine cytology    Actinic keratosis    Other seborrheic keratosis    Agatston coronary artery calcium score less than 100    Alcohol abuse    Allergic rhinitis    Basal cell carcinoma of skin of other part of trunk    Benign essential hypertension    Benign keratosis of skin    Other melanin hyperpigmentation    Biceps muscle tear    Bowel dysfunction    DJD of right shoulder    Elevated PSA    Eosinophilia    Erectile dysfunction    Fatty liver    Hepatomegaly    Gingivitis    Hemangioma of skin and subcutaneous tissue    Hyperglycemia    Hyperlipidemia    Infection of mouth    Osteoarthritis of both knees    Lumbosacral radiculopathy at L1    Right knee DJD    Melanocytic nevi of trunk    Neoplasm of uncertain behavior of skin    BMI 32.0-32.9,adult    PVC (premature ventricular contraction)    Shortness of breath    Ventricular arrhythmia    Melanocytic nevi of unspecified upper limb, including shoulder    Melanocytic nevi of lower limb, including hip    Bilateral leg edema    CAD (coronary artery disease)    Skin lesion    Aortic root aneurysm    Never smoked  cigarettes    Amblyopia suspect, left eye    Asymmetry of optic nerve of both eyes    Dermatochalasis of both upper eyelids    Early dry stage nonexudative age-related macular degeneration of both eyes    Enlarged aorta    Pseudophakia    Regular astigmatism of left eye    Encounter for colorectal cancer screening    Other fatigue    Atrial flutter with rapid ventricular response (Multi)    Exertional dyspnea    HFrEF (heart failure with reduced ejection fraction)    Paroxysmal atrial fibrillation (Multi)    MAY (acute kidney injury)    Atrial fibrillation with RVR (Multi)    Near syncope    TIA (transient ischemic attack)    Noncompliance with medication regimen    Acute kidney injury superimposed on chronic kidney disease    Elevated troponin       ASSESSMENT:   74-year-old gentleman seen for routine cardiovascular follow-up in conjunction with Ammon Allan CNP.    Bedside examination evaluation performed    Chart review details cussed with patient and his wife.    impression:  Paroxysmal atrial fibrillation with RVR  Chronic anticoagulation, has missed quite a few doses  Hypertension  Hyperlipidemia  MAY          PLAN:   Admitted to medicine.  Remains on telemetry.  Telemetry shows rate controlled atrial fibrillation currently.  EKG from the emergency department shows atrial fibrillation with rapid ventricular response.  Nonspecific ST and T wave abnormality.  No STEMI criteria.  Supplemental O2, currently on room air good oxygen saturation.  Monitor electrolytes, keep potassium greater than 4 and magnesium greater than 2  Low suspicion for ACS.  Troponin elevation thought secondary to atrial fibrillation with RVR.  Denies any chest pain or increased shortness of breath.  EKGs are unchanged besides paroxysmal atrial fibrillation.  Nuclear stress test in September 2023 was unremarkable.  MAY improved  Continue 50 mg Toprol XL daily  DC heparin infusion and switch back to Eliquis today  MRI shows no acute  infarct  N.p.o. for LILIANA/cardioversion today  Patient has seen Dr. Lopez in the past for suspected antiarrhythmic therapy versus ablation.  Referral was made to follow back with Dr. Lopez for possible ablation.  If no complications during cardioversion patient can be discharged home later today and follow-up with Dr. Lopez as scheduled  Message sent to schedulers to follow-up outpatient    I have reviewed all medications, laboratory results, and imaging pertinent for today's encounter       Franko Cabrera DO,[facility]      Ammon Allan [facility]  Adult Gerontology Acute Care Nurse Practitioner  [facility] [place]   [facility]  [phone]          Of note, this documentation is completed using the Dragon Dictation system (voice recognition software). There may be spelling and/or grammatical errors that were not corrected prior to final submission.    Please do not hesitate to call with questions.  Electronically signed by Franko Cabrera DO, on 5/29/2025 at 10:06 AM          [1] apixaban, 5 mg, oral, q12h  aspirin, 81 mg, oral, Daily  folic acid, 1 mg, oral, Daily  metoprolol succinate XL, 50 mg, oral, Daily  pantoprazole, 40 mg, oral, Daily before breakfast   Or  pantoprazole, 40 mg, intravenous, Daily before breakfast  pravastatin, 20 mg, oral, Nightly  thiamine, 100 mg, oral, Daily     [2]

## 2025-05-30 VITALS
DIASTOLIC BLOOD PRESSURE: 62 MMHG | RESPIRATION RATE: 18 BRPM | TEMPERATURE: 98.6 F | BODY MASS INDEX: 30.66 KG/M2 | SYSTOLIC BLOOD PRESSURE: 99 MMHG | HEIGHT: 71 IN | WEIGHT: 219 LBS | OXYGEN SATURATION: 99 % | HEART RATE: 59 BPM

## 2025-05-30 PROCEDURE — 99222 1ST HOSP IP/OBS MODERATE 55: CPT

## 2025-05-30 PROCEDURE — 2500000001 HC RX 250 WO HCPCS SELF ADMINISTERED DRUGS (ALT 637 FOR MEDICARE OP): Performed by: NURSE PRACTITIONER

## 2025-05-30 PROCEDURE — 99239 HOSP IP/OBS DSCHRG MGMT >30: CPT | Performed by: HOSPITALIST

## 2025-05-30 PROCEDURE — 2500000001 HC RX 250 WO HCPCS SELF ADMINISTERED DRUGS (ALT 637 FOR MEDICARE OP): Performed by: INTERNAL MEDICINE

## 2025-05-30 RX ADMIN — APIXABAN 5 MG: 5 TABLET, FILM COATED ORAL at 08:12

## 2025-05-30 RX ADMIN — METOPROLOL SUCCINATE 50 MG: 50 TABLET, EXTENDED RELEASE ORAL at 08:12

## 2025-05-30 RX ADMIN — PANTOPRAZOLE SODIUM 40 MG: 40 TABLET, DELAYED RELEASE ORAL at 06:04

## 2025-05-30 RX ADMIN — FOLIC ACID 1 MG: 1 TABLET ORAL at 08:12

## 2025-05-30 RX ADMIN — Medication 100 MG: at 08:12

## 2025-05-30 ASSESSMENT — PAIN SCALES - GENERAL
PAINLEVEL_OUTOF10: 0 - NO PAIN
PAINLEVEL_OUTOF10: 0 - NO PAIN

## 2025-05-30 ASSESSMENT — PAIN - FUNCTIONAL ASSESSMENT: PAIN_FUNCTIONAL_ASSESSMENT: 0-10

## 2025-05-30 ASSESSMENT — VISUAL ACUITY: METHOD_CF: 1

## 2025-05-30 NOTE — DOCUMENTATION CLARIFICATION NOTE
"    PATIENT:               NAE ESPINOZA  ACCT #:                  [id number]  MRN:                       [id number]  :                       1950  ADMIT DATE:       2025 4:10 PM  DISCH DATE:  RESPONDING PROVIDER #:        [id number]          PROVIDER RESPONSE TEXT:    Non-infectious SIRS with acute organ dysfunction of MAY    CDI QUERY TEXT:    Clarification    Instruction:    Based on your assessment of the patient and the clinical information, please provide the requested documentation by clicking on the appropriate radio button and enter any additional information if prompted.    Question: Please further clarify if there is a diagnosis related to the clinical information    When answering this query, please exercise your independent professional judgment. The fact that a question is being asked, does not imply that any particular answer is desired or expected.    The patient's clinical indicators include:  Clinical Information:  74M presents for dizziness; found to have AFib RVR, TIA with MAY    Clinical Indicators:  - VS on admission, : 98.4-106-22-85/58-97%RA  - HR, :   - RR, : 20-22  - Crt, -: 2.23-1.74-1.22  - ED, , [place]: \"found his heart rate to be tachycardic...Atrial fibrillation with RVR; MAY\"    Treatment:  Metoprolol IV (), Metoprolol PO (-present), NS IVF bolus x1L (), LR IVF (-), LILIANA with Cardioversion, EKG, serial creatinine    Risk Factors:  Atrial flutter, paroxysmal A-fib, HFrEF, HTN  Options provided:  -- Non-infectious SIRS with acute organ dysfunction of MAY  -- Other - I will add my own diagnosis  -- Refer to Clinical Documentation Reviewer    Query created by: Remedios Sal on 2025 11:09 AM      Electronically signed by:  SHELLIE NOVOA MD 2025 11:29 AM          "

## 2025-05-30 NOTE — CONSULTS
Inpatient consult to Neurology  Consult performed by: JOSUE Milan-CNP  Consult ordered by: Trey Blanco MD          History Of Present Illness  Johann Casey is a 74 y.o. male presenting with TIA. [place]. [place] [facility] [place] of [place] for feelings of unwell, diaphoresis, lightheaded, and near syncope. He was found to be in Afib-RVR and hypotensive. He has a PMH of Afib and was not taking his Eliquis and Metoprolol as prescribed. There was concern for TIA (symptoms of facial droop, confusion). MRI brain did not show anything acute, remote lacunar in left centrum semiovale. MRA head/neck did not reveal any LVO or SS. Patient was evaluated by cardiology who performed a LILIANA and cardioversion yesterday and he is in NSR at this time.   Pt. Seen and examined, wife at bedside. Per wife she noticed some slurred speech, right facial droop, and confusion last Thursday at around 8pm and she wanted the patient to seed medical care, but he refused. Patient went to bed and awoke and symptoms had resolved. He began having symptoms at work on Tuesday this week and came to ED where he was found to be in Afib RVR. On exam, his stroke-symptoms have resolved. He endorses missing doses of Eliquis and BB.           PMH of paroxysmal atrial fibrillation on anticoagulation, hypertension, CAD without stents, hyperlipidemia, aortic root aneurysm, etoh use. Review of systems are negative unless otherwise specified in HPI.    Past Medical History  Medical History[1]  Surgical History  Surgical History[2]  Social History  Social History[3]  Allergies  Amoxicillin and Losartan  Prescriptions Prior to Admission[4]    Review of Systems  Neurological Exam  Mental Status  Awake, alert and oriented to person, place and time. Recent and remote memory are intact. Speech is normal. Language is fluent with no aphasia. Attention and concentration are normal.    Cranial Nerves  CN II: Right visual acuity: Counts fingers. Left visual acuity:  "Counts fingers. Right normal visual field. Left normal visual field.  CN III, IV, VI: Extraocular movements intact bilaterally. No nystagmus.   Right pupil: 3 mm. Round. Reactive to light. Reactive to accommodation.   Left pupil: 3 mm. Round. Reactive to light. Reactive to accommodation.  CN V:  Right: Facial sensation is normal.  Left: Facial sensation is normal on the left.  CN VII:  Right: There is no facial weakness.  Left: There is no facial weakness.  CN VIII:  Right: Hearing is normal.  Left: Hearing is normal.  CN IX, X:  Right: Palate is normal.  Left: Palate is normal.  CN XI:  Right: Trapezius strength is normal.  Left: Trapezius strength is normal.  CN XII: Tongue midline without atrophy or fasciculations.    Motor  Normal muscle bulk throughout. Normal muscle tone. Strength is 5/5 throughout all four extremities.    Sensory  Light touch is normal in upper and lower extremities.     Reflexes  Deep tendon reflexes are 2+ and symmetric in all four extremities.    Coordination  Right: Finger-to-nose normal.Left: Finger-to-nose normal.    Physical Exam  HENT:      Right Ear: Hearing normal.      Left Ear: Hearing normal.   Eyes:      Extraocular Movements: EOM normal. No nystagmus.   Neurological:      Motor: Motor strength is normal.     Deep Tendon Reflexes: Reflexes are normal and symmetric.   Psychiatric:         Speech: Speech normal.         Last Recorded Vitals  Blood pressure 110/67, pulse 60, temperature 37.1 °C (98.8 °F), temperature source Temporal, resp. rate 18, height 1.803 m (5' 11\"), weight 99.3 kg (219 lb), SpO2 95%.    Relevant Results                    Suresh Coma Scale  Best Eye Response: Spontaneous  Best Verbal Response: Oriented  Best Motor Response: Follows commands  Suresh Coma Scale Score: 15                 I have personally reviewed the following imaging results:   Imaging  MR angio neck wo IV contrast  Result Date: 5/28/2025  1. There is no evidence of significant " flow-limiting stenosis.   MACRO: None   Signed by: Johann West 5/28/2025 3:23 PM Dictation workstation:   [id number]    MR angio head wo IV contrast  Result Date: 5/28/2025  1. There is no large vessel occlusion, significant flow limiting stenosis, or aneurysm.   MACRO: None   Signed by: Johann West 5/28/2025 3:22 PM Dictation workstation:   [id number]    MR brain wo IV contrast  Result Date: 5/28/2025  1. Negative brain MRI examination for acute change. 2. A completed lacunar type defect is present within the left centrum semiovale. 3. There is a background of age-related volume loss and calcified atherosclerotic disease.   MACRO: None   Signed by: Johann West 5/28/2025 3:20 PM Dictation workstation:   [id number]    CT head wo IV contrast  Result Date: 5/27/2025  No CT evidence of acute intracranial abnormality.   Chronic senescent changes including white matter disease commonly attributed to chronic microvascular ischemia and age-appropriate volume loss.       MACRO: None   Signed by: Clinton Babcock 5/27/2025 5:47 PM Dictation workstation:   [id number]    XR chest 1 view  Result Date: 5/27/2025  1.  No evidence of acute cardiopulmonary process.     Signed by: Apolinar Eisenberg 5/27/2025 5:37 PM Dictation workstation:   [id number]      Cardiology, Vascular, and Other Imaging  ECG 12 lead  Result Date: 5/29/2025  Sinus bradycardia Otherwise normal ECG When compared with ECG of 29-MAY-2025 06:29, (unconfirmed) Sinus rhythm has replaced Atrial fibrillation    ECG 12 lead  Result Date: 5/29/2025  Atrial fibrillation Abnormal ECG When compared with ECG of 27-MAY-2025 18:15, Vent. rate has decreased BY  46 BPM T wave inversion no longer evident in Anterior leads    ECG 12 lead  Result Date: 5/28/2025  Atrial fibrillation with rapid ventricular response Nonspecific ST and T wave abnormality Abnormal ECG When compared with ECG of 27-MAY-2025 16:15, (unconfirmed) No significant change was found See ED provider  note for full interpretation and clinical correlation Confirmed by Kirsten Sheikh ([id number]) on 5/28/2025 1:40:01 PM    ECG 12 lead  Result Date: 5/28/2025  Atrial fibrillation with rapid ventricular response Abnormal ECG When compared with ECG of 24-DEC-2024 15:45, Atrial fibrillation has replaced Sinus rhythm See ED provider note for full interpretation and clinical correlation Confirmed by Kirsten Sheikh ([id number]) on 5/28/2025 1:34:25 PM           Assessment/Plan   Assessment & Plan  Atrial flutter with rapid ventricular response (Multi)    Alcohol abuse    Benign essential hypertension    HFrEF (heart failure with reduced ejection fraction)    Near syncope    TIA (transient ischemic attack)    Noncompliance with medication regimen    Acute kidney injury superimposed on chronic kidney disease    Elevated troponin      Impression:  TIA in the setting of Afib, not taking anticoagulation and BB at home  S/p LILIANA and cardioversion with NSR on monitor  Vascular risk factors: HTN, HLD, etoh use, probable ANNIE  Recommendations:   Continue with Eliquis and statin therapy   Discussed with the patient the importance of taking medications daily and not missing doses.    Discussed bleeding precautions with patient while on anti platelet and/or anticoagulation therapy. Discussed stroke prevention such as: HgbA1c <7, tight blood pressure control <130/<80, LDL <70 with statin therapy (if indicated), CPAP/BiPAP compliance (if indicated) and lifestyle modification (Mediterranean diet, daily exercise, nicotine cessation & limiting alcohol use).   Discussed s/sx of stroke such as: face drooping, arm/leg weakness, speech difficulty; sudden numbness of face/extremity, sudden confusion, sudden trouble seeing, sudden trouble walking, sudden severe headache, dizziness, loss of balance or coordination and to call 911 immediately.?      Follow up with Neurology in 4-6 weeks    I have discussed the case, impression and the plan of care  with the Neurologist on-call, Dr. Bautista.    No further needs from neurology; okay for transfer or discharge as per primary team. Please contact if condition changes for re-eval.         I spent 60 minutes in the professional and overall care of this patient.      JOSUE Milan-CNP         [1]   Past Medical History:  Diagnosis Date    Arrhythmia     Cough 01/02/2018    Dermatitis medicamentosa 08/23/2024    Disorder of the skin and subcutaneous tissue, unspecified     Skin lesion    Generalized skin eruption due to drugs and medicaments taken internally     Drug rash    Headache 01/02/2018    Hyperlipidemia     Hypertension     Other specified abnormal findings of blood chemistry 02/25/2022    Abnormal LFTs    Other urticaria     Urticaria, acute    Peripheral edema 08/23/2024    Strain of muscle, fascia and tendon of other parts of biceps, unspecified arm, initial encounter 02/25/2022    Biceps muscle tear   [2]   Past Surgical History:  Procedure Laterality Date    CARDIOVERSION  08/29/2024    CATARACT EXTRACTION, BILATERAL Left 01/25/2024    CATARACT EXTRACTION, BILATERAL Right 01/18/2024    OTHER SURGICAL HISTORY  02/08/2022    Skin biopsy    OTHER SURGICAL HISTORY  02/08/2022    Tonsillectomy    OTHER SURGICAL HISTORY  03/01/2022    Vasectomy   [3]   Social History  Tobacco Use    Smoking status: Never    Smokeless tobacco: Never   Vaping Use    Vaping status: Never Used   Substance Use Topics    Alcohol use: Not Currently     Comment: quit 8/29/2024    Drug use: Never   [4]   Medications Prior to Admission   Medication Sig Dispense Refill Last Dose/Taking    apixaban (Eliquis) 5 mg tablet Take 1 tablet (5 mg) by mouth every 12 hours. 180 tablet 3 Past Week    desloratadine (Clarinex) 5 mg tablet TAKE 1 TABLET BY MOUTH EVERY DAY 90 tablet 0 5/27/2025    hydroCHLOROthiazide (Microzide) 12.5 mg capsule Take 1 capsule (12.5 mg) by mouth once daily. 90 capsule 3 5/27/2025    lisinopril 20 mg tablet Take 1  tablet (20 mg) by mouth once daily. 90 tablet 3 5/27/2025    metoprolol succinate XL (Toprol-XL) 50 mg 24 hr tablet Take 1 tablet (50 mg) by mouth once daily. Do not crush or chew. 90 tablet 3 Past Week    pravastatin (Pravachol) 20 mg tablet Take 1 tablet (20 mg) by mouth once daily at bedtime. 90 tablet 3 5/26/2025    albuterol (Ventolin HFA) 90 mcg/actuation inhaler Inhale 2 puffs every 4 hours if needed for wheezing or shortness of breath. (Patient not taking: Reported on 4/22/2025) 8 g 11

## 2025-05-30 NOTE — DISCHARGE SUMMARY
Discharge Diagnosis  Atrial flutter with rapid ventricular response (Multi), MAY, TIA       Discharge Meds     Your medication list        CONTINUE taking these medications        Instructions Last Dose Given Next Dose Due   apixaban 5 mg tablet  Commonly known as: Eliquis      Take 1 tablet (5 mg) by mouth every 12 hours.       desloratadine 5 mg tablet  Commonly known as: Clarinex      TAKE 1 TABLET BY MOUTH EVERY DAY       hydroCHLOROthiazide 12.5 mg capsule  Commonly known as: Microzide      Take 1 capsule (12.5 mg) by mouth once daily.       lisinopril 20 mg tablet      Take 1 tablet (20 mg) by mouth once daily.       metoprolol succinate XL 50 mg 24 hr tablet  Commonly known as: Toprol-XL      Take 1 tablet (50 mg) by mouth once daily. Do not crush or chew.       pravastatin 20 mg tablet  Commonly known as: Pravachol      Take 1 tablet (20 mg) by mouth once daily at bedtime.              STOP taking these medications      albuterol 90 mcg/actuation inhaler  Commonly known as: Ventolin HFA                 Test Results Pending At Discharge  Pending Labs       No current pending labs.            Hospital Course  74 year old male admitted with dizziness and afib with RVR. Hx of PAF     -seen by cardiology, on eliquis   -s/p LILIANA cardioversion  -continue home BB   -alcohol cessation advised      Dizziness facial droop: symptoms resolved   -MRI done to rule showed old lacunar stroke  -neurology following, continue eliquis and statin for now   -follow up with neurology out patient      MAY vs CKD stage 3: resolved     HTN: resume home meds      DVT ppx: on eliquis     Discharged home in stable condition. Greater than 30 minutes of clinical time spent caring for this patient.     Pertinent Physical Exam At Time of Discharge  Gen: NAD  HEENT: EOM, MMM  CV: RRR, no murmurs rubs or gallops  Resp: Clear to auscultation bilaterally  Abdomen: soft, NT,+BS  LE: No edema      Outpatient Follow-Up  Future Appointments   Date  Time Provider Department [place]   6/12/2025 12:45 PM JOSUE Clarke-CNP [id number] [place]   4/24/2026  1:00 PM Fred Badillo MD [id number] [place]              Trey Blanco MD

## 2025-05-31 LAB
ATRIAL RATE: 56 BPM
ATRIAL RATE: 81 BPM
EJECTION FRACTION: 58 %
P AXIS: 28 DEGREES
P OFFSET: 172 MS
P ONSET: 130 MS
PR INTERVAL: 180 MS
Q ONSET: 220 MS
Q ONSET: 221 MS
QRS COUNT: 11 BEATS
QRS COUNT: 9 BEATS
QRS DURATION: 74 MS
QRS DURATION: 84 MS
QT INTERVAL: 388 MS
QT INTERVAL: 432 MS
QTC CALCULATION(BAZETT): 412 MS
QTC CALCULATION(BAZETT): 416 MS
QTC FREDERICIA: 404 MS
QTC FREDERICIA: 422 MS
R AXIS: 42 DEGREES
R AXIS: 56 DEGREES
T AXIS: 36 DEGREES
T AXIS: 71 DEGREES
T OFFSET: 415 MS
T OFFSET: 436 MS
VENTRICULAR RATE: 56 BPM
VENTRICULAR RATE: 68 BPM

## 2025-06-01 ENCOUNTER — PATIENT OUTREACH (OUTPATIENT)
Dept: CARE COORDINATION | Age: 75
End: 2025-06-01
Payer: MEDICARE

## 2025-06-02 ENCOUNTER — PATIENT OUTREACH (OUTPATIENT)
Dept: CARDIOLOGY | Facility: CLINIC | Age: 75
End: 2025-06-02
Payer: MEDICARE

## 2025-06-02 NOTE — PROGRESS NOTES
Discharge Facility:  [facility]  Discharge Diagnosis: A-flutter  Admission Date: 5/27/25  Discharge Date:  5/30/25    PCP Appointment Date:  [facility]  Specialist Appointment Date:   UN 12 2025 12:45 PM - Cardiology Hospital Discharge  [facility] - JOSUE Clarke[facility]     Hospital Encounter and Summary Linked: Yes  Discharge Summary by Trey Blanco MD (05/30/2025 12:28)     Two attempts were made to reach patient within two business days after discharge. Left voicemail with contact information for patient to call back with any non-emergent questions or concerns.

## 2025-06-04 ENCOUNTER — TELEPHONE (OUTPATIENT)
Dept: CARDIOLOGY | Facility: CLINIC | Age: 75
End: 2025-06-04
Payer: MEDICARE

## 2025-06-04 NOTE — TELEPHONE ENCOUNTER
----- Message from Dodie Cheema sent at 5/28/2025  4:02 PM EDT -----  Regarding: FW: Hospital discharge follow-up  Can you talk with Dr. Lopez tomorrow?  ----- Message -----  From: Linda Dennis  Sent: 5/28/2025   2:11 PM EDT  To: Do [id number] Card[facility] Clerical  Subject: FW: Hospital discharge follow-up                 Hi,    I did attempt to schedule but looks like 1st opening is end of August. Are you possibly able to get pt in any sooner? Requesting 4-6 wk follow up.    Thanks!  ----- Message -----  From: ADILENE Clarke  Sent: 5/28/2025  12:49 PM EDT  To:  [id number] [email or web address] Clerical  Subject: Hospital discharge follow-up                     Please schedule patient to see Dr. Lopez in the next 4 to 6 weeks.  He has seen him in the past.

## 2025-06-04 NOTE — TELEPHONE ENCOUNTER
I have tried to contact Johann numerous times over the last few days and left voicemail's with the office number to get scheduled with Dr. Lopez for a hospital FU,  I have not heard back from the patient.

## 2025-06-12 ENCOUNTER — APPOINTMENT (OUTPATIENT)
Dept: CARDIOLOGY | Facility: CLINIC | Age: 75
End: 2025-06-12
Payer: MEDICARE

## 2025-06-12 VITALS
SYSTOLIC BLOOD PRESSURE: 132 MMHG | HEART RATE: 56 BPM | HEIGHT: 71 IN | BODY MASS INDEX: 30.8 KG/M2 | WEIGHT: 220 LBS | DIASTOLIC BLOOD PRESSURE: 76 MMHG

## 2025-06-12 DIAGNOSIS — I10 HYPERTENSION, UNSPECIFIED TYPE: Primary | ICD-10-CM

## 2025-06-12 DIAGNOSIS — I10 BENIGN ESSENTIAL HYPERTENSION: ICD-10-CM

## 2025-06-12 DIAGNOSIS — I48.0 PAROXYSMAL ATRIAL FIBRILLATION (MULTI): ICD-10-CM

## 2025-06-12 PROCEDURE — 93000 ELECTROCARDIOGRAM COMPLETE: CPT | Performed by: INTERNAL MEDICINE

## 2025-06-12 PROCEDURE — 1111F DSCHRG MED/CURRENT MED MERGE: CPT | Performed by: NURSE PRACTITIONER

## 2025-06-12 PROCEDURE — 1159F MED LIST DOCD IN RCRD: CPT | Performed by: NURSE PRACTITIONER

## 2025-06-12 PROCEDURE — 3008F BODY MASS INDEX DOCD: CPT | Performed by: NURSE PRACTITIONER

## 2025-06-12 PROCEDURE — 3078F DIAST BP <80 MM HG: CPT | Performed by: NURSE PRACTITIONER

## 2025-06-12 PROCEDURE — 1036F TOBACCO NON-USER: CPT | Performed by: NURSE PRACTITIONER

## 2025-06-12 PROCEDURE — 99495 TRANSJ CARE MGMT MOD F2F 14D: CPT | Performed by: NURSE PRACTITIONER

## 2025-06-12 PROCEDURE — 3075F SYST BP GE 130 - 139MM HG: CPT | Performed by: NURSE PRACTITIONER

## 2025-06-12 ASSESSMENT — ENCOUNTER SYMPTOMS
MUSCULOSKELETAL NEGATIVE: 1
RESPIRATORY NEGATIVE: 1
PND: 0
NEAR-SYNCOPE: 0
PALPITATIONS: 0
EYES NEGATIVE: 1
ENDOCRINE NEGATIVE: 1
ORTHOPNEA: 0
GASTROINTESTINAL NEGATIVE: 1
DYSPNEA ON EXERTION: 0
CONSTITUTIONAL NEGATIVE: 1

## 2025-06-12 NOTE — PROGRESS NOTES
CARDIOLOGY OFFICE VISIT      CHIEF COMPLAINT  Chief Complaint   Patient presents with    Hospital Follow-up     2 WEEK TCM- Patient was hospitalized at [facility] from 5/27/2025-5/30/2025 for atrial flutter with rapid ventricular response       HISTORY OF PRESENT ILLNESS  74-year-old male who was admitted to [facility] at the end of May with complaints of dizziness and was found to be in A-fib with RVR.  He has a history of paroxysmal atrial fibrillation.  He underwent successful cardioversion and currently remains in sinus bradycardia/sinus rhythm.  Due to his dizziness he did undergo an MRI at the hospital which showed an old lacunar stroke.  No further recommendations.  His blood pressure and heart rate remain stable.  He denies any palpitations, nausea or vomiting.  Denies any dizziness.  Taking all of his medications appropriately.  He will follow-up with Dr. Badillo in 6 months.    Past Medical History  Medical History[1]    Social History  Social History[2]    Family History   Family History[3]     Allergies:  RX Allergies[4]     Outpatient Medications:  Current Outpatient Medications   Medication Instructions    apixaban (ELIQUIS) 5 mg, oral, Every 12 hours    desloratadine (CLARINEX) 5 mg, oral, Daily    hydroCHLOROthiazide (MICROZIDE) 12.5 mg, oral, Daily    lisinopril 20 mg, oral, Daily    metoprolol succinate XL (TOPROL-XL) 50 mg, oral, Daily, Do not crush or chew.    pravastatin (PRAVACHOL) 20 mg, oral, Nightly          REVIEW OF SYSTEMS  Review of Systems   Constitutional: Negative.   HENT: Negative.     Eyes: Negative.    Cardiovascular:  Negative for chest pain, dyspnea on exertion, near-syncope, orthopnea, palpitations and paroxysmal nocturnal dyspnea.   Respiratory: Negative.     Endocrine: Negative.    Skin: Negative.    Musculoskeletal: Negative.    Gastrointestinal: Negative.        VITALS  Vitals:    06/12/25 1238   BP: 132/76   Pulse: 56     Wt  Readings from Last 4 Encounters:   06/12/25 99.8 kg (220 lb)   05/27/25 99.3 kg (219 lb)   04/22/25 99.3 kg (219 lb)   01/03/25 98.4 kg (217 lb)       PHYSICAL EXAM  GENERAL:  Well developed, well nourished, in no acute distress.  CHEST:  Symmetric and nontender.  NEURO/PSYCH:  Alert and oriented times three with approppriate behavior and responses.  NECK:  Supple, no JVD, no bruit.  LUNGS:  Clear to auscultation bilaterally, normal respiratory effort.  HEART:  Rate and rhythm regular with no evident murmur, no gallop appreciated.    There are no rubs, clicks or heaves.  EXTREMITIES:  Warm with good color, no clubbing or cyanosis.  There is no edema noted.  PERIPHERAL VASCULAR:  Pulses present and equally palpable; 2+ throughout.    Notable Studies:   EKG:   Recent Labs     05/29/25  1606 05/29/25  0632 05/27/25  1820   VENTRATE 56 68 128   ATRRATE 56 81 102   QTCFRED 422 404 353   QRSDUR 74 84 70   QTCCALCB 416 412 400     Encounter Date: 05/27/25   ECG 12 lead   Result Value    Ventricular Rate 56    Atrial Rate 56    LA Interval 180    QRS Duration 74    QT Interval 432    QTC Calculation(Bazett) 416    P Axis 28    R Axis 42    T Axis 36    QRS Count 9    Q Onset 220    P Onset 130    P Offset 172    T Offset 436    QTC Fredericia 422    Narrative    Sinus bradycardia  Otherwise normal ECG    Confirmed by Ammon Arreaga ([id number]) on 5/31/2025 2:53:20 PM     Echocardiogram:   Recent Labs     05/29/25  1306 08/29/24  1325 08/23/24  1516   EF 58 55 45   LVIDD  --   --  4.99   Echocardiogram     Narrative  [facility]  [address] [place] [address], [address], [place] [address]  Tel [phone] Fax [phone]    TRANSTHORACIC ECHOCARDIOGRAM REPORT      Patient Name:     NAE Herrera Physician:   [id number] Franko GONZALEZ  Study Date:       9/28/2023    Referring Physician: HUSSAIN WILCOX  MRN/PID:          [id number]     PCP:                 [id number] Bg Yanez MD  Accession/Order#:  [id number] Department Location: [facility]  YOB: 1950    Fellow:  Gender:           M            Nurse:  Admit Date:       9/28/2023    Sonographer:         Franko Sanches  Admission Status: Outpatient   Additional Staff:  Height:           177.80 cm    CC Report to:  Weight:           104.78 kg    Study Type:          Echocardiogram  BSA:              2.22 m2  Blood Pressure: 160 /80 mmHg    Diagnosis/ICD: I25.10-Atherosclerotic heart disease of native coronary artery  without angina pectoris; I10-Essential (primary) hypertension;  R93.1-Abnormal findings on diagnostic imaging of heart and  coronary circulation; R06.02-Shortness of breath  Indication:    CAD, HTN, CA Score <100, SOB  Procedure/CPT: Echo Complete w Full Doppler-[id number]    Patient History:  Pertinent History: CAD, HTN, Hyperlipidemia and Abn. EKG, CA Calcium score <100,  SoB, ETOH, CA, Vent. Arrhythmia.    Study Detail: The following Echo studies were performed: M-Mode, 2D, Doppler and  color flow. The patient was awake.      PHYSICIAN INTERPRETATION:  Left Ventricle: Left ventricular systolic function is normal, with an estimated ejection fraction of 55-60%. There are no regional wall motion abnormalities. The left ventricular cavity size is normal. There is mild concentric left ventricular hypertrophy. Spectral Doppler shows a normal pattern of left ventricular diastolic filling.  LV Wall Scoring:  All segments are normal.    Left Atrium: The left atrium is normal in size.  Right Ventricle: The right ventricle is mildly enlarged. There is normal right ventricular global systolic function.  Right Atrium: The right atrium is mildly dilated.  Aortic Valve: The aortic valve appears structurally normal. The aortic valve appears tricuspid. There is mild aortic valve cusp calcification. There is no evidence of aortic valve stenosis.  There is no evidence of aortic valve regurgitation. The peak instantaneous gradient  of the aortic valve is 5.4 mmHg. The mean gradient of the aortic valve is 3.0 mmHg.  Mitral Valve: The mitral valve is normal in structure. There is no evidence of mitral valve stenosis. The doppler estimated mean and peak diastolic pressure gradients are 1.0 mmHg and 2.6 mmHg respectively. There is normal mitral valve leaflet mobility. There is mild mitral annular calcification. There is mild mitral valve regurgitation.  Tricuspid Valve: The tricuspid valve is structurally normal. There is normal tricuspid valve leaflet mobility. There is mild tricuspid regurgitation.  Pulmonic Valve: The pulmonic valve is structurally normal. There is trace pulmonic valve regurgitation.  Pericardium: There is no pericardial effusion noted.  Aorta: The aortic root is normal. There is moderate dilatation of the ascending aorta.  Systemic Veins: The inferior vena cava appears to be of normal size.      CONCLUSIONS:  1. Left ventricular systolic function is normal with a 55-60% estimated ejection fraction.  2. There is no evidence of mitral valve stenosis.  3. Mild mitral valve regurgitation.  4. Mild tricuspid regurgitation is visualized.  5. Aortic valve stenosis is not present.  6. There is moderate dilatation of the ascending aorta.    QUANTITATIVE DATA SUMMARY:  2D MEASUREMENTS:  Normal Ranges:  Ao Root d:     3.10 cm    (2.0-3.7cm)  LAs:           3.10 cm    (2.7-4.0cm)  RVIDd:         2.80 cm    (0.9-3.6cm)  IVSd:          1.20 cm    (0.6-1.1cm)  LVPWd:         1.20 cm    (0.6-1.1cm)  LVIDd:         4.90 cm    (3.9-5.9cm)  LVIDs:         3.10 cm  LV Mass Index: 102.0 g/m2  LV % FS        36.7 %    LA VOLUME:  Normal Ranges:  LA Area A4C:     20.4 cm2  LA Area A2C:     26.4 cm2  LA Volume Index: 35.1 ml/m2  LA Vol A4C:      60.0 ml  LA Vol A2C:      86.0 ml  LA Vol BP:       78.0 ml    RA VOLUME BY A/L METHOD:  Normal Ranges:  RA Vol A4C:        88.2 ml    (8.3-19.5ml)  RA Vol Index A4C:  39.7 ml/m2  RA Area A4C:       24.1  cm2  RA Major Axis A4C: 5.6 cm    AORTA MEASUREMENTS:  Normal Ranges:  Asc Ao, d: 3.90 cm (2.1-3.4cm)    LV SYSTOLIC FUNCTION BY 2D PLANIMETRY (MOD):  Normal Ranges:  EF-A4C View: 56.5 % (>=55%)  EF-A2C View: 56.2 %  EF-Biplane:  55.3 %    LV DIASTOLIC FUNCTION:  Normal Ranges:  MV Peak E:        0.63 m/s    (0.7-1.2 m/s)  MV Peak A:        0.47 m/s    (0.42-0.7 m/s)  E/A Ratio:        1.35        (1.0-2.2)  MV lateral e'     0.10 m/s  MV medial e'      0.05 m/s  MV A Dur:         254.00 msec  E/e' Ratio:       6.20        (<8.0)  PulmV Sys Geoffrey:    37.20 cm/s  PulmV Jacome Geoffrey:   53.70 cm/s  PulmV S/D Geoffrey:    0.70  PulmV A Revs Geoffrey: 26.00 cm/s  PulmV A Revs Dur: 141.00 msec    MITRAL VALVE:  Normal Ranges:  MV Vmax:    0.81 m/s (<=1.3m/s)  MV peak P.6 mmHg (<5mmHg)  MV mean P.0 mmHg (<48mmHg)  MV DT:      313 msec (150-240msec)    AORTIC VALVE:  Normal Ranges:  AoV Vmax:                1.16 m/s (<=1.7m/s)  AoV Peak P.4 mmHg (<20mmHg)  AoV Mean PG:             3.0 mmHg (1.7-11.5mmHg)  LVOT Max Geoffrey:            0.82 m/s (<=1.1m/s)  AoV VTI:                 27.80 cm (18-25cm)  LVOT VTI:                22.30 cm  LVOT Diameter:           2.00 cm  (1.8-2.4cm)  AoV Area, VTI:           2.52 cm2 (2.5-5.5cm2)  AoV Area,Vmax:           2.23 cm2 (2.5-4.5cm2)  AoV Dimensionless Index: 0.80      RIGHT VENTRICLE:  RV Basal 4.00 cm  RV Mid   2.80 cm  RV Major 6.9 cm    TRICUSPID VALVE/RVSP:  Normal Ranges:  Peak TR Velocity: 2.07 m/s  Est. RA Pressure: 3 mmHg  RV Syst Pressure: 20.1 mmHg (< 30mmHg)    PULMONIC VALVE:  Normal Ranges:  PV Accel Time: 95 msec  (>120ms)  PV Max Geoffrey:    0.8 m/s  (0.6-0.9m/s)  PV Max P.3 mmHg    Pulmonary Veins:  PulmV A Revs Dur: 141.00 msec  PulmV A Revs Geoffrey: 26.00 cm/s  PulmV Jacome Geoffrey:   53.70 cm/s  PulmV S/D Geoffrey:    0.70  PulmV Sys Geoffrey:    37.20 cm/s      [id number] Franko Cabrera DO  Electronically signed on 2023 at 4:35:30 PM      Wall Scoring            Coronary  Angiography: No results found for this or any previous visit from the past 1800 days.    Nuclear:No results found for this or any previous visit from the past 1800 days.        Recent Lab Results:    CMP:    Lab Results   Component Value Date     (L) 05/29/2025    K 4.4 05/29/2025     05/29/2025    CO2 24 05/29/2025    BUN 20 05/29/2025    CREATININE 1.22 05/29/2025    GLUCOSE 103 (H) 05/29/2025    CALCIUM 8.7 05/29/2025       Magnesium:    Lab Results   Component Value Date    MG 2.00 05/29/2025       Lipid Profile:    Lab Results   Component Value Date    TRIG 73 05/29/2025    HDL 54.2 05/29/2025    LDLCALC 53 05/29/2025       TSH:    Lab Results   Component Value Date    TSH 1.03 08/22/2024       BNP:   Lab Results   Component Value Date     (H) 05/27/2025        PT/INR:    Lab Results   Component Value Date    PROTIME 14.3 (H) 05/27/2025    INR 1.3 (H) 05/27/2025       HgBA1c:    Lab Results   Component Value Date    HGBA1C 5.4 08/23/2024       BMP:  Lab Results   Component Value Date     (L) 05/29/2025     (L) 05/28/2025     (L) 05/27/2025    K 4.4 05/29/2025    K 4.5 05/28/2025    K 5.0 05/27/2025     05/29/2025    CL 98 05/28/2025    CL 96 (L) 05/27/2025    CO2 24 05/29/2025    CO2 24 05/28/2025    CO2 26 05/27/2025    BUN 20 05/29/2025    BUN 23 05/28/2025    BUN 23 05/27/2025    CREATININE 1.22 05/29/2025    CREATININE 1.74 (H) 05/28/2025    CREATININE 2.23 (H) 05/27/2025       CBC:  Lab Results   Component Value Date    WBC 5.3 05/29/2025    WBC 5.4 05/28/2025    WBC 10.3 05/27/2025    RBC 3.97 (L) 05/29/2025    RBC 4.12 (L) 05/28/2025    RBC 4.39 (L) 05/27/2025    HGB 13.5 05/29/2025    HGB 14.3 05/28/2025    HGB 15.1 05/27/2025    HCT 39.0 (L) 05/29/2025    HCT 41.2 05/28/2025    HCT 43.6 05/27/2025    MCV 98 05/29/2025     05/28/2025    MCV 99 05/27/2025    MCH 34.0 05/29/2025    MCH 34.7 (H) 05/28/2025    MCH 34.4 (H) 05/27/2025    MCHC 34.6 05/29/2025     MCHC 34.7 05/28/2025    MCHC 34.6 05/27/2025    RDW 12.1 05/29/2025    RDW 12.2 05/28/2025    RDW 12.2 05/27/2025     (L) 05/29/2025     (L) 05/28/2025     05/27/2025       Cardiac Enzymes:    Lab Results   Component Value Date    TROPHS 32 (H) 05/27/2025    TROPHS 30 (H) 05/27/2025    TROPHS 10 08/22/2024       Hepatic Function Panel:    Lab Results   Component Value Date    ALKPHOS 43 05/27/2025    ALT 16 05/27/2025    AST 16 05/27/2025    PROT 6.9 05/27/2025    BILITOT 1.2 05/27/2025         ASSESSMENT AND PLAN  Diagnoses and all orders for this visit:  Hypertension, unspecified type  -     Follow Up In Cardiology; Future  Benign essential hypertension  Paroxysmal atrial fibrillation (Multi)  -     ECG 12 lead (Clinic Performed)    Presented to the office today after being hospitalized for A-fib with RVR.  Underwent successful cardioversion to restore normal sinus rhythm.  Tolerating all medications appropriately.  Denies any dizziness or lightheadedness.  EKG shows sinus bradycardia  Blood pressure and heart rate stable  Advised to continue low-sodium diet and exercise.  Patient will follow-up with Dr. Badillo in 6 months.      Ammon [facility]  Adult Gerontology Acute Care Nurse Practitioner   [facility] [place]   [facility]  Pager: [phone]      **Disclaimer: This note was dictated by speech recognition, and every effort has been made to prevent any error in transcription, however minor errors may be present**          [1]   Past Medical History:  Diagnosis Date    Arrhythmia     Cough 01/02/2018    Dermatitis medicamentosa 08/23/2024    Disorder of the skin and subcutaneous tissue, unspecified     Skin lesion    Generalized skin eruption due to drugs and medicaments taken internally     Drug rash    Headache 01/02/2018    Hyperlipidemia     Hypertension     Other specified abnormal findings of blood chemistry  02/25/2022    Abnormal LFTs    Other urticaria     Urticaria, acute    Peripheral edema 08/23/2024    Strain of muscle, fascia and tendon of other parts of biceps, unspecified arm, initial encounter 02/25/2022    Biceps muscle tear   [2]   Social History  Tobacco Use    Smoking status: Never    Smokeless tobacco: Never   Vaping Use    Vaping status: Never Used   Substance Use Topics    Alcohol use: Yes     Comment: quit 8/29/2024 drinking heavily bit drinks 1-3 beers a week    Drug use: Never   [3]   Family History  Problem Relation Name Age of Onset    Hypertension Mother      Cancer Mother      Colon cancer Mother      Hypertension Father      Lung cancer Father      Cancer Father     [4]   Allergies  Allergen Reactions    Amoxicillin Rash    Losartan Rash

## 2025-06-13 ENCOUNTER — PATIENT OUTREACH (OUTPATIENT)
Dept: CARDIOLOGY | Facility: CLINIC | Age: 75
End: 2025-06-13
Payer: MEDICARE

## 2025-06-17 DIAGNOSIS — J30.9 ALLERGIC RHINITIS, UNSPECIFIED: ICD-10-CM

## 2025-06-20 RX ORDER — DESLORATADINE 5 MG/1
5 TABLET ORAL DAILY
Qty: 90 TABLET | Refills: 0 | Status: SHIPPED | OUTPATIENT
Start: 2025-06-20

## 2026-04-24 ENCOUNTER — APPOINTMENT (OUTPATIENT)
Dept: CARDIOLOGY | Facility: CLINIC | Age: 76
End: 2026-04-24
Payer: MEDICARE